# Patient Record
Sex: FEMALE | Race: OTHER | NOT HISPANIC OR LATINO
[De-identification: names, ages, dates, MRNs, and addresses within clinical notes are randomized per-mention and may not be internally consistent; named-entity substitution may affect disease eponyms.]

---

## 2022-10-24 ENCOUNTER — APPOINTMENT (OUTPATIENT)
Dept: NEUROSURGERY | Facility: CLINIC | Age: 77
End: 2022-10-24

## 2022-10-24 ENCOUNTER — NON-APPOINTMENT (OUTPATIENT)
Age: 77
End: 2022-10-24

## 2022-10-24 VITALS — RESPIRATION RATE: 16 BRPM | SYSTOLIC BLOOD PRESSURE: 170 MMHG | DIASTOLIC BLOOD PRESSURE: 85 MMHG | HEART RATE: 67 BPM

## 2022-10-24 DIAGNOSIS — I10 ESSENTIAL (PRIMARY) HYPERTENSION: ICD-10-CM

## 2022-10-24 PROCEDURE — 99204 OFFICE O/P NEW MOD 45 MIN: CPT

## 2022-10-24 NOTE — PHYSICAL EXAM
[General Appearance - Alert] : alert [General Appearance - In No Acute Distress] : in no acute distress [Person] : oriented to person [Place] : oriented to place [Time] : oriented to time [Cranial Nerves Oculomotor (III)] : extraocular motion intact [Cranial Nerves Optic (II)] : visual acuity intact bilaterally,  pupils equal round and reactive to light [Cranial Nerves Trigeminal (V)] : facial sensation intact symmetrically [Cranial Nerves Facial (VII)] : face symmetrical [Cranial Nerves Vestibulocochlear (VIII)] : hearing was intact bilaterally [Cranial Nerves Glossopharyngeal (IX)] : tongue and palate midline [Cranial Nerves Accessory (XI - Cranial And Spinal)] : head turning and shoulder shrug symmetric [Cranial Nerves Hypoglossal (XII)] : there was no tongue deviation with protrusion [Motor Tone] : muscle tone was normal in all four extremities [Motor Strength] : muscle strength was normal in all four extremities [Motor Handedness Right-Handed] : the patient is right hand dominant [Intact] : all sensory within normal limits bilaterally [Neck Appearance] : the appearance of the neck was normal [] : no respiratory distress [Abnormal Walk] : normal gait

## 2022-10-25 ENCOUNTER — APPOINTMENT (OUTPATIENT)
Dept: PULMONOLOGY | Facility: CLINIC | Age: 77
End: 2022-10-25

## 2022-10-25 DIAGNOSIS — Z01.818 ENCOUNTER FOR OTHER PREPROCEDURAL EXAMINATION: ICD-10-CM

## 2022-10-25 NOTE — DATA REVIEWED
[de-identified] : I have reviewed the most recent MRI which shows a left frontal meningioma with extensive edema

## 2022-10-25 NOTE — HISTORY OF PRESENT ILLNESS
[de-identified] : 77 year old female who lives in [place] presents with newly diagnosed left frontal mass. \par

## 2022-10-25 NOTE — ASSESSMENT
[FreeTextEntry1] : Patient will need to have this meningioma removed.  I have explained the risks of the procedure to the patient including but not limited to pain, infection, seizure, stroke, blindness, residual or recurrent disease, neurovascular injury, weakness, paralysis, heart attack, pulmonary embolism and death and he/she clearly understands and agrees to proceed.\par

## 2022-10-26 ENCOUNTER — INPATIENT (INPATIENT)
Facility: HOSPITAL | Age: 77
LOS: 22 days | Discharge: NURSING, REHAB OR LONG-TERM CARE | DRG: 25 | End: 2022-11-18
Attending: NEUROLOGICAL SURGERY | Admitting: NEUROLOGICAL SURGERY
Payer: MEDICARE

## 2022-10-26 ENCOUNTER — TRANSCRIPTION ENCOUNTER (OUTPATIENT)
Age: 77
End: 2022-10-26

## 2022-10-26 VITALS
TEMPERATURE: 98 F | RESPIRATION RATE: 18 BRPM | SYSTOLIC BLOOD PRESSURE: 156 MMHG | WEIGHT: 169.98 LBS | DIASTOLIC BLOOD PRESSURE: 90 MMHG | OXYGEN SATURATION: 100 % | HEART RATE: 78 BPM | HEIGHT: 68 IN

## 2022-10-26 DIAGNOSIS — Z01.818 ENCOUNTER FOR OTHER PREPROCEDURAL EXAMINATION: ICD-10-CM

## 2022-10-26 DIAGNOSIS — I10 ESSENTIAL (PRIMARY) HYPERTENSION: ICD-10-CM

## 2022-10-26 DIAGNOSIS — Z98.890 OTHER SPECIFIED POSTPROCEDURAL STATES: Chronic | ICD-10-CM

## 2022-10-26 DIAGNOSIS — D32.9 BENIGN NEOPLASM OF MENINGES, UNSPECIFIED: ICD-10-CM

## 2022-10-26 DIAGNOSIS — Z29.9 ENCOUNTER FOR PROPHYLACTIC MEASURES, UNSPECIFIED: ICD-10-CM

## 2022-10-26 LAB
ALBUMIN SERPL ELPH-MCNC: 4 G/DL — SIGNIFICANT CHANGE UP (ref 3.3–5)
ALP SERPL-CCNC: 74 U/L — SIGNIFICANT CHANGE UP (ref 40–120)
ALT FLD-CCNC: 8 U/L — LOW (ref 10–45)
ANION GAP SERPL CALC-SCNC: 9 MMOL/L — SIGNIFICANT CHANGE UP (ref 5–17)
ANISOCYTOSIS BLD QL: SLIGHT — SIGNIFICANT CHANGE UP
APTT BLD: 30.1 SEC — SIGNIFICANT CHANGE UP (ref 27.5–35.5)
AST SERPL-CCNC: 18 U/L — SIGNIFICANT CHANGE UP (ref 10–40)
BASOPHILS # BLD AUTO: 0 K/UL — SIGNIFICANT CHANGE UP (ref 0–0.2)
BASOPHILS NFR BLD AUTO: 0 % — SIGNIFICANT CHANGE UP (ref 0–2)
BILIRUB SERPL-MCNC: 0.4 MG/DL — SIGNIFICANT CHANGE UP (ref 0.2–1.2)
BLD GP AB SCN SERPL QL: NEGATIVE — SIGNIFICANT CHANGE UP
BLD GP AB SCN SERPL QL: NEGATIVE — SIGNIFICANT CHANGE UP
BUN SERPL-MCNC: 13 MG/DL — SIGNIFICANT CHANGE UP (ref 7–23)
CALCIUM SERPL-MCNC: 9.2 MG/DL — SIGNIFICANT CHANGE UP (ref 8.4–10.5)
CHLORIDE SERPL-SCNC: 109 MMOL/L — HIGH (ref 96–108)
CO2 SERPL-SCNC: 27 MMOL/L — SIGNIFICANT CHANGE UP (ref 22–31)
CREAT SERPL-MCNC: 0.79 MG/DL — SIGNIFICANT CHANGE UP (ref 0.5–1.3)
EGFR: 78 ML/MIN/1.73M2 — SIGNIFICANT CHANGE UP
EOSINOPHIL # BLD AUTO: 0.09 K/UL — SIGNIFICANT CHANGE UP (ref 0–0.5)
EOSINOPHIL NFR BLD AUTO: 1.7 % — SIGNIFICANT CHANGE UP (ref 0–6)
GIANT PLATELETS BLD QL SMEAR: PRESENT — SIGNIFICANT CHANGE UP
GLUCOSE SERPL-MCNC: 80 MG/DL — SIGNIFICANT CHANGE UP (ref 70–99)
HCT VFR BLD CALC: 35 % — SIGNIFICANT CHANGE UP (ref 34.5–45)
HGB BLD-MCNC: 11.2 G/DL — LOW (ref 11.5–15.5)
HYPOCHROMIA BLD QL: SLIGHT — SIGNIFICANT CHANGE UP
INR BLD: 1.22 — HIGH (ref 0.88–1.16)
LYMPHOCYTES # BLD AUTO: 1.39 K/UL — SIGNIFICANT CHANGE UP (ref 1–3.3)
LYMPHOCYTES # BLD AUTO: 25.2 % — SIGNIFICANT CHANGE UP (ref 13–44)
MAGNESIUM SERPL-MCNC: 1.9 MG/DL — SIGNIFICANT CHANGE UP (ref 1.6–2.6)
MANUAL SMEAR VERIFICATION: SIGNIFICANT CHANGE UP
MCHC RBC-ENTMCNC: 22.4 PG — LOW (ref 27–34)
MCHC RBC-ENTMCNC: 32 GM/DL — SIGNIFICANT CHANGE UP (ref 32–36)
MCV RBC AUTO: 70 FL — LOW (ref 80–100)
MICROCYTES BLD QL: SLIGHT — SIGNIFICANT CHANGE UP
MONOCYTES # BLD AUTO: 0.34 K/UL — SIGNIFICANT CHANGE UP (ref 0–0.9)
MONOCYTES NFR BLD AUTO: 6.1 % — SIGNIFICANT CHANGE UP (ref 2–14)
NEUTROPHILS # BLD AUTO: 3.69 K/UL — SIGNIFICANT CHANGE UP (ref 1.8–7.4)
NEUTROPHILS NFR BLD AUTO: 67 % — SIGNIFICANT CHANGE UP (ref 43–77)
OVALOCYTES BLD QL SMEAR: SLIGHT — SIGNIFICANT CHANGE UP
PHOSPHATE SERPL-MCNC: 3.4 MG/DL — SIGNIFICANT CHANGE UP (ref 2.5–4.5)
PLAT MORPH BLD: ABNORMAL
PLATELET # BLD AUTO: 164 K/UL — SIGNIFICANT CHANGE UP (ref 150–400)
POLYCHROMASIA BLD QL SMEAR: SLIGHT — SIGNIFICANT CHANGE UP
POTASSIUM SERPL-MCNC: 4.3 MMOL/L — SIGNIFICANT CHANGE UP (ref 3.5–5.3)
POTASSIUM SERPL-SCNC: 4.3 MMOL/L — SIGNIFICANT CHANGE UP (ref 3.5–5.3)
PROT SERPL-MCNC: 7.2 G/DL — SIGNIFICANT CHANGE UP (ref 6–8.3)
PROTHROM AB SERPL-ACNC: 14.5 SEC — HIGH (ref 10.5–13.4)
RBC # BLD: 5 M/UL — SIGNIFICANT CHANGE UP (ref 3.8–5.2)
RBC # FLD: 15.9 % — HIGH (ref 10.3–14.5)
RBC BLD AUTO: ABNORMAL
RH IG SCN BLD-IMP: POSITIVE — SIGNIFICANT CHANGE UP
RH IG SCN BLD-IMP: POSITIVE — SIGNIFICANT CHANGE UP
SARS-COV-2 RNA SPEC QL NAA+PROBE: NEGATIVE — SIGNIFICANT CHANGE UP
SCHISTOCYTES BLD QL AUTO: SLIGHT — SIGNIFICANT CHANGE UP
SODIUM SERPL-SCNC: 145 MMOL/L — SIGNIFICANT CHANGE UP (ref 135–145)
SPHEROCYTES BLD QL SMEAR: SLIGHT — SIGNIFICANT CHANGE UP
WBC # BLD: 5.51 K/UL — SIGNIFICANT CHANGE UP (ref 3.8–10.5)
WBC # FLD AUTO: 5.51 K/UL — SIGNIFICANT CHANGE UP (ref 3.8–10.5)

## 2022-10-26 PROCEDURE — 70450 CT HEAD/BRAIN W/O DYE: CPT | Mod: 26

## 2022-10-26 PROCEDURE — 99285 EMERGENCY DEPT VISIT HI MDM: CPT

## 2022-10-26 PROCEDURE — 93970 EXTREMITY STUDY: CPT | Mod: 26

## 2022-10-26 PROCEDURE — 71045 X-RAY EXAM CHEST 1 VIEW: CPT | Mod: 26

## 2022-10-26 PROCEDURE — 70553 MRI BRAIN STEM W/O & W/DYE: CPT | Mod: 26

## 2022-10-26 PROCEDURE — 99222 1ST HOSP IP/OBS MODERATE 55: CPT

## 2022-10-26 RX ORDER — LANOLIN ALCOHOL/MO/W.PET/CERES
5 CREAM (GRAM) TOPICAL AT BEDTIME
Refills: 0 | Status: DISCONTINUED | OUTPATIENT
Start: 2022-10-26 | End: 2022-10-27

## 2022-10-26 RX ORDER — SODIUM CHLORIDE 9 MG/ML
1000 INJECTION INTRAMUSCULAR; INTRAVENOUS; SUBCUTANEOUS
Refills: 0 | Status: DISCONTINUED | OUTPATIENT
Start: 2022-10-26 | End: 2022-10-27

## 2022-10-26 RX ORDER — ACETAMINOPHEN 500 MG
650 TABLET ORAL EVERY 6 HOURS
Refills: 0 | Status: DISCONTINUED | OUTPATIENT
Start: 2022-10-26 | End: 2022-10-27

## 2022-10-26 RX ORDER — LISINOPRIL 2.5 MG/1
10 TABLET ORAL ONCE
Refills: 0 | Status: COMPLETED | OUTPATIENT
Start: 2022-10-26 | End: 2022-10-26

## 2022-10-26 RX ORDER — POVIDONE-IODINE 5 %
1 AEROSOL (ML) TOPICAL ONCE
Refills: 0 | Status: DISCONTINUED | OUTPATIENT
Start: 2022-10-27 | End: 2022-10-27

## 2022-10-26 RX ORDER — MAGNESIUM SULFATE 500 MG/ML
1 VIAL (ML) INJECTION ONCE
Refills: 0 | Status: COMPLETED | OUTPATIENT
Start: 2022-10-26 | End: 2022-10-26

## 2022-10-26 RX ORDER — HYDRALAZINE HCL 50 MG
10 TABLET ORAL ONCE
Refills: 0 | Status: COMPLETED | OUTPATIENT
Start: 2022-10-26 | End: 2022-10-26

## 2022-10-26 RX ORDER — SENNA PLUS 8.6 MG/1
2 TABLET ORAL AT BEDTIME
Refills: 0 | Status: DISCONTINUED | OUTPATIENT
Start: 2022-10-26 | End: 2022-10-27

## 2022-10-26 RX ORDER — METOPROLOL TARTRATE 50 MG
25 TABLET ORAL EVERY 12 HOURS
Refills: 0 | Status: DISCONTINUED | OUTPATIENT
Start: 2022-10-26 | End: 2022-10-27

## 2022-10-26 RX ORDER — ACETAMINOPHEN 500 MG
650 TABLET ORAL ONCE
Refills: 0 | Status: COMPLETED | OUTPATIENT
Start: 2022-10-26 | End: 2022-10-26

## 2022-10-26 RX ORDER — LISINOPRIL 2.5 MG/1
40 TABLET ORAL ONCE
Refills: 0 | Status: DISCONTINUED | OUTPATIENT
Start: 2022-10-26 | End: 2022-10-26

## 2022-10-26 RX ORDER — CHLORHEXIDINE GLUCONATE 213 G/1000ML
1 SOLUTION TOPICAL EVERY 12 HOURS
Refills: 0 | Status: COMPLETED | OUTPATIENT
Start: 2022-10-26 | End: 2022-10-27

## 2022-10-26 RX ADMIN — SENNA PLUS 2 TABLET(S): 8.6 TABLET ORAL at 23:22

## 2022-10-26 RX ADMIN — Medication 10 MILLIGRAM(S): at 13:44

## 2022-10-26 RX ADMIN — Medication 650 MILLIGRAM(S): at 09:24

## 2022-10-26 RX ADMIN — Medication 25 MILLIGRAM(S): at 11:58

## 2022-10-26 RX ADMIN — CHLORHEXIDINE GLUCONATE 1 APPLICATION(S): 213 SOLUTION TOPICAL at 23:22

## 2022-10-26 RX ADMIN — Medication 100 GRAM(S): at 11:58

## 2022-10-26 RX ADMIN — Medication 25 MILLIGRAM(S): at 23:22

## 2022-10-26 RX ADMIN — LISINOPRIL 10 MILLIGRAM(S): 2.5 TABLET ORAL at 11:58

## 2022-10-26 NOTE — ED PROVIDER NOTE - CLINICAL SUMMARY MEDICAL DECISION MAKING FREE TEXT BOX
77F c PMH of HTN (on lisinopril), meningioma (no hx of neurosurgery) no PSH p/w gradual onset pulsatile headache that started 2pm yesterday after she landed in [place] after flying from [place]. On exam, VS wnl, non-focal neuro exam, no remarkable exam findings.    ddx: meningioma vs ich vs tension ha vs migraine    Plan:  - labs  - CTH  - tylenol for pain  - covid test  - neurosurgery paged, awaiting callback  - reassess

## 2022-10-26 NOTE — ED ADULT TRIAGE NOTE - OTHER COMPLAINTS
patient c/o frontal HA and nausea since yesterday at 2 PM with hx of meningioma--sent by MD Joseph for further eval

## 2022-10-26 NOTE — ED ADULT NURSE NOTE - OBJECTIVE STATEMENT
Pt is a 78 y/o female A&Ox3 but slow to recall information, accompanied by son, c/o intermittent frontal h/a and nausea since 1400 yesterday. Pt denies vomiting, head injury, fever/chills, vision changes, numbness/tingling, unilateral weakness. Hx of meningioma. Pt talking in clear, full sentences, respirations even and unlabored.

## 2022-10-26 NOTE — PROGRESS NOTE ADULT - SUBJECTIVE AND OBJECTIVE BOX
Surgery: bifrontal craniotomy for meningioma resection  Consent: Signed by  HCP                   NAME/NUMBER of HCP: Diaz Jennings (son, HCP): [phone]    Representative Consent: [  ] Signed by patient vs HCP                                                 [  ] N/A -> only for cerebral angiogram    No Known Drug Allergies  strawberry (Hives)    Subjective: Patient admitted in AM. C/o mild headache.    T(C): 36.6 (10-26-22 @ 10:05), Max: 36.7 (10-26-22 @ 08:13)  HR: 56 (10-26-22 @ 10:05) (56 - 78)  BP: 170/96 (10-26-22 @ 10:05) (156/90 - 170/96)  RR: 18 (10-26-22 @ 10:05) (18 - 18)  SpO2: 98% (10-26-22 @ 10:05) (98% - 100%)  Wt(kg): --    Physical Exam:   General: patient seen laying supine in bed in NAD  Neuro: AAOx2, follows commands, opens eyes spontaneously, confused speech, CNII-XI grossly intact, face symmetric, no pronator drift, finger to nose intact, strength 5/5 b/l upper extremities and lower extremities, sensation intact to light touch throughout  HEENT: PERRL, EOMI, VFs intact b/l  Neck: supple  Cardiac: RRR, S1S2  Pulmonary: chest rise symmetric  Abdomen: soft, nontender, nondistended  Ext: perfusing well  Skin: warm, dry      10-26    145  |  109<H>  |  13  ----------------------------<  80  4.3   |  27  |  0.79    Ca    9.2      26 Oct 2022 09:15  Phos  3.4     10-26  Mg     1.9     10-26    TPro  7.2  /  Alb  4.0  /  TBili  0.4  /  DBili  x   /  AST  18  /  ALT  8<L>  /  AlkPhos  74  10-26    CBC Full  -  ( 26 Oct 2022 09:15 )  WBC Count : 5.51 K/uL  RBC Count : 5.00 M/uL  Hemoglobin : 11.2 g/dL  Hematocrit : 35.0 %  Platelet Count - Automated : 164 K/uL  Mean Cell Volume : 70.0 fl  Mean Cell Hemoglobin : 22.4 pg  Mean Cell Hemoglobin Concentration : 32.0 gm/dL  Auto Neutrophil # : 3.69 K/uL  Auto Lymphocyte # : 1.39 K/uL  Auto Monocyte # : 0.34 K/uL  Auto Eosinophil # : 0.09 K/uL  Auto Basophil # : 0.00 K/uL  Auto Neutrophil % : 67.0 %  Auto Lymphocyte % : 25.2 %  Auto Monocyte % : 6.1 %  Auto Eosinophil % : 1.7 %  Auto Basophil % : 0.0 %    PT/INR - ( 26 Oct 2022 09:15 )   PT: 14.5 sec;   INR: 1.22          PTT - ( 26 Oct 2022 09:15 )  PTT:30.1 sec    Pregnancy test (serum hcg for any female under 56, must be resulted day before OR): [ ] Negative Result  [ ] Positive Result  [ x] N/A : male or female>57 y/o  Type & Screen (in past 72hrs):     2 Type & Screen within 72 hours if anticipate blood need in OR:  _ Y _ N  - pending result     Blood ordered and on hold for OR:   [ ] No need     [ ] 1u pRBC on hold      [x ] 2u pRBC on hold    COVID swab (in past 48hrs): x Y  _N    CXR: 10/26 no infiltrate  EKG: NSR  Medical Clearances: pending clearance by Dr. White    Last dose of antiplatelet/anticoagulation dru/2 tablet aspirin 81mg over 1 week ago    Implanted Devices (pacemaker, drug pump...etc):  []YES   [x] NO                  If yes --> EPS consulted to interrogate device: [ ] YES  [ ] NO                            If yes -->  EPS called to let them know patient going for surgery: [ ] device needs to be turned off                                                                                                                                                 [ ] magnet needs to be placed for surgery                                                                                                                                                [ ] nothing to do per EP, may proceed with cautery use in OR                                       3M nasal swab ordered?  xY  _N    Cranial surgery: Order written for hair to be shampooed night before surgery and morning before surgery  [x] yes   []no  Chlorhexidine Wipes ordered for Neck Down?  x Y  _ N  (twice a day if 1 day before surgery, daily for 3 days if 3 days prior, daily if in ICU)                 Assessment:  77 year old female with pmh HTN found to have left frontal mass, likely meningioma, on workup for confusion and memory loss. Patient is preop for bifrontal craniotomy for meningioma resection Thursday.    Plan:  Neuro:  - neuro/vital checks q1  - pain control prn  - pending MRI brain w/w/o contrast w/ sherwin and T2/flair  - pending CT head sherwin w/ neuroplastics protocol  - preop for OR thursday  - pending medical clearance    Cardiac:  - normotensive BP goal  - hold home lisinopril preop  - continue home metoprolol 25 BID (tartrate inpatient, was on ER at home)    Pulmonary:  - on RA    GI:  - regular diet, NPO after midnight  - bowel regimen    Renal:  - Na goal 135-145  - IVF while NPO    Heme:  - SCDs  - pending baseline LE dopplers    Endo:  - no issues    ID:  - afebrile    Disposition: regional status, full code, preop for OR 10/27, dispo pending    D/w Dr. Garnica      Assessment:  Present when checked    []  GCS  E   V  M     Heart Failure: []Acute, [] acute on chronic , []chronic  Heart Failure:  [] Diastolic (HFpEF), [] Systolic (HFrEF), []Combined (HFpEF and HFrEF), [] RHF, [] Pulm HTN, [] Other    [] OZZIE, [] ATN, [] AIN, [] other  [] CKD1, [] CKD2, [] CKD 3, [] CKD 4, [] CKD 5, []ESRD    Encephalopathy: [] Metabolic, [] Hepatic, [] toxic, [] Neurological, [] Other    Abnormal Nurtitional Status: [] malnurtition (see nutrition note), [ ]underweight: BMI < 19, [] morbid obesity: BMI >40, [] Cachexia    [] Sepsis  [] hypovolemic shock,[] cardiogenic shock, [] hemorrhagic shock, [] neuogenic shock  [] Acute Respiratory Failure  []Cerebral edema, [] Brain compression/ herniation,   [] Functional quadriplegia  [] Acute blood loss anemia

## 2022-10-26 NOTE — H&P ADULT - NSHPPHYSICALEXAM_GEN_ALL_CORE
General: patient seen laying supine in bed in NAD  Neuro: AAOx2, follows commands, opens eyes spontaneously, confused speech, CNII-XI grossly intact, face symmetric, no pronator drift, finger to nose intact, strength 5/5 b/l upper extremities and lower extremities, sensation intact to light touch throughout  HEENT: PERRL, EOMI, VFs intact b/l  Neck: supple  Cardiac: RRR, S1S2  Pulmonary: chest rise symmetric  Abdomen: soft, nontender, nondistended  Ext: perfusing well  Skin: warm, dry

## 2022-10-26 NOTE — CONSULT NOTE ADULT - ASSESSMENT
76 yo F hx of HTN, Hyperthyroidism s/p resection who presented with likely L frontal meningioma and planned for bifrontal craniotomy with meningioma resection 10/27

## 2022-10-26 NOTE — CONSULT NOTE ADULT - PROBLEM SELECTOR RECOMMENDATION 3
RCRI score 0, Puri score 0.3%  METs > 4, able to walk up >2 flights of stairs, does Yoga and pilates  EKG NSR no ischemic changes  - medically optimized for OR no further workup indicated, low risk for intermediate risk procedure

## 2022-10-26 NOTE — CONSULT NOTE ADULT - SUBJECTIVE AND OBJECTIVE BOX
Patient is a 77y old  Female who presents with a chief complaint of meningioma (26 Oct 2022 11:52)      HPI:  77 year old female with pmh HTN who lives in [place] presents with left frontal mass, likely meningioma. Per son, meningioma was diagnosed upon CT head performed for memory loss and confusion. Son reports that confusion has been ongoing for several months. Patient admits to mild headache but denies diplopia, blurry vision, nausea, vomiting, extremity numbness or weakness and slurred speech. Patient is preop for bifrontal craniotomy for meningioma resection 10/27.   (26 Oct 2022 10:58)    Patient seen and examined at bedside with son at bedside.  Pleasant, slightly confused.  She has a hx of HTN which she take Toprol 25mg BID and Lisinopril 10mg for.  She previously had hyperthyroidism s/p resection.  No personal hx of DM, CVA, CHF, renal dysfunction.   Currently feels well denies fever, chills, headache, N/V, abd pain, diarrhea, constipation.  Prior to admission has good functional status, walks often, able to do > 2 flights of stairs w/o chest pain.  Has a  come to her home for Yoga/Pilates and never SOB or CP during sessions      REVIEW OF SYSTEMS: see HPI    PAST MEDICAL & SURGICAL HISTORY:  Meningioma      Hypertension      H/O hyperthyroidism  s/p thyroid surgery, no longer on medication      Status post thyroid surgery          FAMILY HISTORY: No family cardiac history    SOCIAL HISTORY:  Tobacco use: Never smoker  EtOH use: None  Recreational drug use: None    MEDICATIONS:  MEDICATIONS  (STANDING):  chlorhexidine 2% Cloths 1 Application(s) Topical every 12 hours  metoprolol tartrate 25 milliGRAM(s) Oral every 12 hours  senna 2 Tablet(s) Oral at bedtime  sodium chloride 0.9%. 1000 milliLiter(s) (50 mL/Hr) IV Continuous <Continuous>    MEDICATIONS  (PRN):  acetaminophen     Tablet .. 650 milliGRAM(s) Oral every 6 hours PRN Temp greater or equal to 38C (100.4F), Mild Pain (1 - 3)      ALLERGIES:  Allergies    No Known Drug Allergies  strawberry (Hives)    Intolerances        VITAL SIGNS:  Vital Signs Last 24 Hrs  T(C): 36.6 (26 Oct 2022 13:37), Max: 36.7 (26 Oct 2022 08:13)  T(F): 97.9 (26 Oct 2022 13:37), Max: 98 (26 Oct 2022 08:13)  HR: 67 (26 Oct 2022 14:14) (56 - 78)  BP: 163/96 (26 Oct 2022 14:14) (156/89 - 177/95)  BP(mean): --  RR: 16 (26 Oct 2022 14:14) (15 - 18)  SpO2: 99% (26 Oct 2022 14:14) (95% - 100%)    Parameters below as of 26 Oct 2022 14:14  Patient On (Oxygen Delivery Method): room air          PHYSICAL EXAM:  Constitutional: resting comfortably in bed; NAD  Head: NC/AT  Eyes: PERRL, EOMI, anicteric sclera  ENT: no nasal discharge; uvula midline,; MMM  Neck: supple; no JVD  Respiratory: CTA B/L; no W/R/R, no retractions  Cardiac: +S1/S2; RRR; no M/R/G  Gastrointestinal: abdomen soft, NT/ND; no rebound or guarding; +BSx4  Back: spine midline  Extremities: WWP, no clubbing or cyanosis; no peripheral edema  Musculoskeletal: NROM x4; no joint swelling, tenderness or erythema  Vascular: 2+ radial, femoral, DP/PT pulses B/L  Dermatologic: skin warm, dry and intact; no rashes, wounds, or scars  Lymphatic: no submandibular or cervical LAD  Neurologic: AAOx2, mild confusion; CNII-XII grossly intact; no focal deficits  Psychiatric: pleasant     LABS:                        11.2   5.51  )-----------( 164      ( 26 Oct 2022 09:15 )             35.0     10-26    145  |  109<H>  |  13  ----------------------------<  80  4.3   |  27  |  0.79    Ca    9.2      26 Oct 2022 09:15  Phos  3.4     10-26  Mg     1.9     10-26    TPro  7.2  /  Alb  4.0  /  TBili  0.4  /  DBili  x   /  AST  18  /  ALT  8<L>  /  AlkPhos  74  10-26    PT/INR - ( 26 Oct 2022 09:15 )   PT: 14.5 sec;   INR: 1.22          PTT - ( 26 Oct 2022 09:15 )  PTT:30.1 sec        CAPILLARY BLOOD GLUCOSE              RADIOLOGY & ADDITIONAL TESTS:  < from: CT Head No Cont (10.26.22 @ 11:24) >  IMPRESSION: 2.4 cm isodense left frontal mass with extensive vasogenic   edema. Mass effect on the left frontal horn with left to right midline   shift. Given the presence of bony hyperostosis findings favor to   represent meningioma. Images are available for surgical guidance.    < end of copied text >      < from: US Duplex Venous Lower Ext Complete, Bilateral (10.26.22 @ 13:10) >  IMPRESSION:  No evidence of deep venous thrombosis in either lower extremity.    < end of copied text >

## 2022-10-26 NOTE — H&P ADULT - HISTORY OF PRESENT ILLNESS
77 year old female who lives in [place] presents with left frontal mass, likely meningioma. Patient admits to mild headache but denies diplopia, blurry vision, nausea, vomiting, extremity numbness or weakness and    77 year old female with pmh HTN who lives in [place] presents with left frontal mass, likely meningioma. Per son, meningioma was diagnosed upon CT head performed for memory loss and confusion. Son reports that confusion has been ongoing for several months. Patient admits to mild headache but denies diplopia, blurry vision, nausea, vomiting, extremity numbness or weakness and slurred speech. Patient is preop for bifrontal craniotomy for meningioma resection 10/27.

## 2022-10-26 NOTE — ED PROVIDER NOTE - OBJECTIVE STATEMENT
77F c PMH of HTN (on lisinopril), meningioma (no hx of neurosurgery) no PSH p/w gradual onset pulsatile headache that started 2pm yesterday after she landed in [place] after flying from [place]. no trauma. has hx of HAs in the past, unclear if this is similar. denies bloodthinner use. was sent to ED by her neurosurgeon Dr. Garnica.    denies slurred speech, focal numbness or weakness, or vision changes

## 2022-10-26 NOTE — H&P ADULT - NSHPLABSRESULTS_GEN_ALL_CORE
.  LABS:                         11.2   5.51  )-----------( 164      ( 26 Oct 2022 09:15 )             35.0     10-26    145  |  109<H>  |  13  ----------------------------<  80  4.3   |  27  |  0.79    Ca    9.2      26 Oct 2022 09:15  Phos  3.4     10-26  Mg     1.9     10-26    TPro  7.2  /  Alb  4.0  /  TBili  0.4  /  DBili  x   /  AST  18  /  ALT  8<L>  /  AlkPhos  74  10-26    PT/INR - ( 26 Oct 2022 09:15 )   PT: 14.5 sec;   INR: 1.22          PTT - ( 26 Oct 2022 09:15 )  PTT:30.1 sec          RADIOLOGY, EKG & ADDITIONAL TESTS: Reviewed.

## 2022-10-26 NOTE — H&P ADULT - ASSESSMENT
77 year old female with pmh HTN found to have left frontal mass, likely meningioma, on workup for confusion and memory loss. Patient is preop for bifrontal craniotomy for meningioma resection Thursday.    Plan:  Neuro:  - neuro/vital checks q1  - pain control prn  - pending MRI brain w/w/o contrast w/ sherwin and T2/flair  - pending CT head sherwin w/ neuroplastics protocol  - preop for OR thursday  - pending medical clearance    Cardiac:  - normotensive BP goal  - hold home lisinopril preop  - continue home metoprolol 25 BID (tartrate inpatient, was on ER at home)    Pulmonary:  - on RA    GI:  - regular diet, NPO after midnight  - bowel regimen    Renal:  - Na goal 135-145  - IVF while NPO    Heme:  - SCDs  - pending baseline LE dopplers    Endo:  - no issues    ID:  - afebrile    Disposition: regional status, full code, preop for OR 10/27, dispo pending    D/w Dr. Garnica

## 2022-10-26 NOTE — ED PROVIDER NOTE - PROGRESS NOTE DETAILS
spoke with neurosurgery team who states pt sent for admission for resection of meningioma requests admission to their service will admit

## 2022-10-26 NOTE — H&P ADULT - NSICDXPASTMEDICALHX_GEN_ALL_CORE_FT
PAST MEDICAL HISTORY:  H/O hyperthyroidism s/p thyroid surgery, no longer on medication    Hypertension     Meningioma

## 2022-10-26 NOTE — CONSULT NOTE ADULT - PROBLEM SELECTOR RECOMMENDATION 2
- on Toprol XL 25 BID and lisinopril 10mg at home  - Lopressor 25mg BID and can uptitrate as needed  - hold home lisinopril on day of OR

## 2022-10-26 NOTE — ED PROVIDER NOTE - PHYSICAL EXAMINATION
NEURO: pupils 3 mm, PERRL, EOMI (CN III, IV, VI), facial sensation intact to light touch in all 3 divisions bilat (CN V), face is symmetric with normal eye closure, eye opening, and smile (CN VII), hearing is normal to rubbing fingers (CN VII), palate elevates symmetrically, phonation is normal (CN IX, X),  shoulder shrug intact bilat (CN XI), tongue is midline with nl movements and no atrophy (CN XII), finger to nose test nl bilat, negative pronator drift bilat,, speech is clear; 5/5 motor strength BUE and BLE: deltoids, biceps, triceps, wrist flexors/extensors, hand , hip flexors, knee flexors/extensors, plantar/dorsiflexors, hallux flexors/extensors; sensation intact to light touch BUE and BLE: C5-T1 and L3-S1 gait wnl   no nystagmus

## 2022-10-27 ENCOUNTER — APPOINTMENT (OUTPATIENT)
Dept: NEUROSURGERY | Facility: HOSPITAL | Age: 77
End: 2022-10-27

## 2022-10-27 ENCOUNTER — RESULT REVIEW (OUTPATIENT)
Age: 77
End: 2022-10-27

## 2022-10-27 ENCOUNTER — TRANSCRIPTION ENCOUNTER (OUTPATIENT)
Age: 77
End: 2022-10-27

## 2022-10-27 LAB
ALBUMIN SERPL ELPH-MCNC: 3.4 G/DL — SIGNIFICANT CHANGE UP (ref 3.3–5)
ALP SERPL-CCNC: 65 U/L — SIGNIFICANT CHANGE UP (ref 40–120)
ALT FLD-CCNC: 7 U/L — LOW (ref 10–45)
ANION GAP SERPL CALC-SCNC: 10 MMOL/L — SIGNIFICANT CHANGE UP (ref 5–17)
ANION GAP SERPL CALC-SCNC: 9 MMOL/L — SIGNIFICANT CHANGE UP (ref 5–17)
APTT BLD: 27.6 SEC — SIGNIFICANT CHANGE UP (ref 27.5–35.5)
AST SERPL-CCNC: 16 U/L — SIGNIFICANT CHANGE UP (ref 10–40)
BASE EXCESS BLDA CALC-SCNC: -4 MMOL/L — LOW (ref -2–3)
BILIRUB SERPL-MCNC: <0.2 MG/DL — SIGNIFICANT CHANGE UP (ref 0.2–1.2)
BUN SERPL-MCNC: 10 MG/DL — SIGNIFICANT CHANGE UP (ref 7–23)
BUN SERPL-MCNC: 10 MG/DL — SIGNIFICANT CHANGE UP (ref 7–23)
CA-I BLDA-SCNC: 1.14 MMOL/L — LOW (ref 1.15–1.33)
CALCIUM SERPL-MCNC: 8.9 MG/DL — SIGNIFICANT CHANGE UP (ref 8.4–10.5)
CALCIUM SERPL-MCNC: 9 MG/DL — SIGNIFICANT CHANGE UP (ref 8.4–10.5)
CHLORIDE SERPL-SCNC: 110 MMOL/L — HIGH (ref 96–108)
CHLORIDE SERPL-SCNC: 114 MMOL/L — HIGH (ref 96–108)
CHOLEST SERPL-MCNC: 173 MG/DL — SIGNIFICANT CHANGE UP
CO2 BLDA-SCNC: 22 MMOL/L — SIGNIFICANT CHANGE UP (ref 19–24)
CO2 SERPL-SCNC: 22 MMOL/L — SIGNIFICANT CHANGE UP (ref 22–31)
CO2 SERPL-SCNC: 23 MMOL/L — SIGNIFICANT CHANGE UP (ref 22–31)
COHGB MFR BLDA: 1.6 % — SIGNIFICANT CHANGE UP
CREAT SERPL-MCNC: 0.68 MG/DL — SIGNIFICANT CHANGE UP (ref 0.5–1.3)
CREAT SERPL-MCNC: 0.7 MG/DL — SIGNIFICANT CHANGE UP (ref 0.5–1.3)
EGFR: 90 ML/MIN/1.73M2 — SIGNIFICANT CHANGE UP
EGFR: 91 ML/MIN/1.73M2 — SIGNIFICANT CHANGE UP
GLUCOSE BLDA-MCNC: 112 MG/DL — HIGH (ref 70–99)
GLUCOSE BLDC GLUCOMTR-MCNC: 139 MG/DL — HIGH (ref 70–99)
GLUCOSE BLDC GLUCOMTR-MCNC: 150 MG/DL — HIGH (ref 70–99)
GLUCOSE SERPL-MCNC: 146 MG/DL — HIGH (ref 70–99)
GLUCOSE SERPL-MCNC: 148 MG/DL — HIGH (ref 70–99)
HCO3 BLDA-SCNC: 21 MMOL/L — SIGNIFICANT CHANGE UP (ref 21–28)
HCT VFR BLD CALC: 30.8 % — LOW (ref 34.5–45)
HCV AB S/CO SERPL IA: 0.04 S/CO — SIGNIFICANT CHANGE UP
HCV AB SERPL-IMP: SIGNIFICANT CHANGE UP
HDLC SERPL-MCNC: 58 MG/DL — SIGNIFICANT CHANGE UP
HGB BLD-MCNC: 9.9 G/DL — LOW (ref 11.5–15.5)
HGB BLDA-MCNC: 10.2 G/DL — LOW (ref 11.7–16.1)
INR BLD: 1.37 — HIGH (ref 0.88–1.16)
LIPID PNL WITH DIRECT LDL SERPL: 106 MG/DL — HIGH
MAGNESIUM SERPL-MCNC: 1.7 MG/DL — SIGNIFICANT CHANGE UP (ref 1.6–2.6)
MCHC RBC-ENTMCNC: 22.4 PG — LOW (ref 27–34)
MCHC RBC-ENTMCNC: 32.1 GM/DL — SIGNIFICANT CHANGE UP (ref 32–36)
MCV RBC AUTO: 69.7 FL — LOW (ref 80–100)
METHGB MFR BLDA: 0.7 % — SIGNIFICANT CHANGE UP
NON HDL CHOLESTEROL: 115 MG/DL — SIGNIFICANT CHANGE UP
NRBC # BLD: 0 /100 WBCS — SIGNIFICANT CHANGE UP (ref 0–0)
OSMOLALITY SERPL: 296 MOSM/KG — SIGNIFICANT CHANGE UP (ref 280–301)
OXYHGB MFR BLDA: 97.7 % — HIGH (ref 90–95)
PCO2 BLDA: 36 MMHG — SIGNIFICANT CHANGE UP (ref 32–45)
PH BLDA: 7.37 — SIGNIFICANT CHANGE UP (ref 7.35–7.45)
PHOSPHATE SERPL-MCNC: 4.1 MG/DL — SIGNIFICANT CHANGE UP (ref 2.5–4.5)
PLATELET # BLD AUTO: 150 K/UL — SIGNIFICANT CHANGE UP (ref 150–400)
PO2 BLDA: 216 MMHG — HIGH (ref 83–108)
POTASSIUM BLDA-SCNC: 2.9 MMOL/L — CRITICAL LOW (ref 3.5–5.1)
POTASSIUM SERPL-MCNC: 4.1 MMOL/L — SIGNIFICANT CHANGE UP (ref 3.5–5.3)
POTASSIUM SERPL-MCNC: 4.4 MMOL/L — SIGNIFICANT CHANGE UP (ref 3.5–5.3)
POTASSIUM SERPL-SCNC: 4.1 MMOL/L — SIGNIFICANT CHANGE UP (ref 3.5–5.3)
POTASSIUM SERPL-SCNC: 4.4 MMOL/L — SIGNIFICANT CHANGE UP (ref 3.5–5.3)
PROT SERPL-MCNC: 5.9 G/DL — LOW (ref 6–8.3)
PROTHROM AB SERPL-ACNC: 16.4 SEC — HIGH (ref 10.5–13.4)
RBC # BLD: 4.42 M/UL — SIGNIFICANT CHANGE UP (ref 3.8–5.2)
RBC # FLD: 15.9 % — HIGH (ref 10.3–14.5)
SAO2 % BLDA: 100 % — HIGH (ref 94–98)
SODIUM BLDA-SCNC: 134 MMOL/L — LOW (ref 136–145)
SODIUM SERPL-SCNC: 143 MMOL/L — SIGNIFICANT CHANGE UP (ref 135–145)
SODIUM SERPL-SCNC: 145 MMOL/L — SIGNIFICANT CHANGE UP (ref 135–145)
TRIGL SERPL-MCNC: 46 MG/DL — SIGNIFICANT CHANGE UP
WBC # BLD: 7.66 K/UL — SIGNIFICANT CHANGE UP (ref 3.8–10.5)
WBC # FLD AUTO: 7.66 K/UL — SIGNIFICANT CHANGE UP (ref 3.8–10.5)

## 2022-10-27 PROCEDURE — 61601 RESECT/EXCISE CRANIAL LESION: CPT | Mod: 22

## 2022-10-27 PROCEDURE — 61583 CRANIOFACIAL APPROACH SKULL: CPT

## 2022-10-27 PROCEDURE — 99233 SBSQ HOSP IP/OBS HIGH 50: CPT

## 2022-10-27 PROCEDURE — 88341 IMHCHEM/IMCYTCHM EA ADD ANTB: CPT | Mod: 26,59

## 2022-10-27 PROCEDURE — 61781 SCAN PROC CRANIAL INTRA: CPT

## 2022-10-27 PROCEDURE — 88342 IMHCHEM/IMCYTCHM 1ST ANTB: CPT | Mod: 26,59

## 2022-10-27 PROCEDURE — 88360 TUMOR IMMUNOHISTOCHEM/MANUAL: CPT | Mod: 26

## 2022-10-27 PROCEDURE — 88307 TISSUE EXAM BY PATHOLOGIST: CPT | Mod: 26

## 2022-10-27 PROCEDURE — 15750 NEUROVASCULAR PEDICLE FLAP: CPT

## 2022-10-27 PROCEDURE — 70450 CT HEAD/BRAIN W/O DYE: CPT | Mod: 26

## 2022-10-27 PROCEDURE — 88311 DECALCIFY TISSUE: CPT | Mod: 26

## 2022-10-27 PROCEDURE — 88305 TISSUE EXAM BY PATHOLOGIST: CPT | Mod: 26

## 2022-10-27 PROCEDURE — 99291 CRITICAL CARE FIRST HOUR: CPT

## 2022-10-27 DEVICE — SURGIFLO HEMOSTATIC MATRIX KIT: Type: IMPLANTABLE DEVICE | Site: LEFT | Status: FUNCTIONAL

## 2022-10-27 DEVICE — DVC ADHERUS AUTOSPRAY W/ DURAL SEALANT EXT TIP: Type: IMPLANTABLE DEVICE | Site: LEFT | Status: FUNCTIONAL

## 2022-10-27 DEVICE — MAYFIELD SKULL PIN ADULT PLASTIC: Type: IMPLANTABLE DEVICE | Site: LEFT | Status: FUNCTIONAL

## 2022-10-27 DEVICE — MESH DYNAMIC 1.7MM 90X90X.3MM: Type: IMPLANTABLE DEVICE | Site: LEFT | Status: FUNCTIONAL

## 2022-10-27 DEVICE — SURGIFOAM PAD 8CM X 12.5CM X 10MM (100): Type: IMPLANTABLE DEVICE | Site: LEFT | Status: FUNCTIONAL

## 2022-10-27 DEVICE — SCREW UN3 AXS SELF DRILL 1.5X4MM 5/PK: Type: IMPLANTABLE DEVICE | Site: LEFT | Status: FUNCTIONAL

## 2022-10-27 DEVICE — SURGICEL 4 X 8": Type: IMPLANTABLE DEVICE | Site: LEFT | Status: FUNCTIONAL

## 2022-10-27 DEVICE — PLATE COVER BURRHOLE UN3 W/TAB 14MM: Type: IMPLANTABLE DEVICE | Site: LEFT | Status: FUNCTIONAL

## 2022-10-27 RX ORDER — ACETAMINOPHEN 500 MG
1000 TABLET ORAL EVERY 6 HOURS
Refills: 0 | Status: DISCONTINUED | OUTPATIENT
Start: 2022-10-27 | End: 2022-10-27

## 2022-10-27 RX ORDER — CEFAZOLIN SODIUM 1 G
2000 VIAL (EA) INJECTION EVERY 8 HOURS
Refills: 0 | Status: DISCONTINUED | OUTPATIENT
Start: 2022-10-27 | End: 2022-10-27

## 2022-10-27 RX ORDER — SENNA PLUS 8.6 MG/1
2 TABLET ORAL AT BEDTIME
Refills: 0 | Status: DISCONTINUED | OUTPATIENT
Start: 2022-10-27 | End: 2022-11-18

## 2022-10-27 RX ORDER — ACETAMINOPHEN 500 MG
650 TABLET ORAL EVERY 6 HOURS
Refills: 0 | Status: DISCONTINUED | OUTPATIENT
Start: 2022-10-28 | End: 2022-11-18

## 2022-10-27 RX ORDER — LEVETIRACETAM 250 MG/1
500 TABLET, FILM COATED ORAL EVERY 12 HOURS
Refills: 0 | Status: DISCONTINUED | OUTPATIENT
Start: 2022-10-27 | End: 2022-10-27

## 2022-10-27 RX ORDER — HYDRALAZINE HCL 50 MG
5 TABLET ORAL ONCE
Refills: 0 | Status: COMPLETED | OUTPATIENT
Start: 2022-10-27 | End: 2022-10-27

## 2022-10-27 RX ORDER — CEFAZOLIN SODIUM 1 G
2000 VIAL (EA) INJECTION EVERY 8 HOURS
Refills: 0 | Status: DISCONTINUED | OUTPATIENT
Start: 2022-10-27 | End: 2022-10-31

## 2022-10-27 RX ORDER — HYDRALAZINE HCL 50 MG
10 TABLET ORAL
Refills: 0 | Status: DISCONTINUED | OUTPATIENT
Start: 2022-10-27 | End: 2022-10-29

## 2022-10-27 RX ORDER — MAGNESIUM SULFATE 500 MG/ML
2 VIAL (ML) INJECTION ONCE
Refills: 0 | Status: COMPLETED | OUTPATIENT
Start: 2022-10-27 | End: 2022-10-27

## 2022-10-27 RX ORDER — DEXTROSE 50 % IN WATER 50 %
15 SYRINGE (ML) INTRAVENOUS ONCE
Refills: 0 | Status: DISCONTINUED | OUTPATIENT
Start: 2022-10-27 | End: 2022-10-29

## 2022-10-27 RX ORDER — OXYCODONE HYDROCHLORIDE 5 MG/1
10 TABLET ORAL EVERY 4 HOURS
Refills: 0 | Status: DISCONTINUED | OUTPATIENT
Start: 2022-10-27 | End: 2022-10-28

## 2022-10-27 RX ORDER — SODIUM CHLORIDE 9 MG/ML
1000 INJECTION, SOLUTION INTRAVENOUS
Refills: 0 | Status: DISCONTINUED | OUTPATIENT
Start: 2022-10-27 | End: 2022-10-29

## 2022-10-27 RX ORDER — LANOLIN ALCOHOL/MO/W.PET/CERES
5 CREAM (GRAM) TOPICAL AT BEDTIME
Refills: 0 | Status: DISCONTINUED | OUTPATIENT
Start: 2022-10-27 | End: 2022-10-28

## 2022-10-27 RX ORDER — KETOROLAC TROMETHAMINE 30 MG/ML
15 SYRINGE (ML) INJECTION EVERY 6 HOURS
Refills: 0 | Status: DISCONTINUED | OUTPATIENT
Start: 2022-10-27 | End: 2022-10-27

## 2022-10-27 RX ORDER — PANTOPRAZOLE SODIUM 20 MG/1
40 TABLET, DELAYED RELEASE ORAL
Refills: 0 | Status: DISCONTINUED | OUTPATIENT
Start: 2022-10-27 | End: 2022-11-12

## 2022-10-27 RX ORDER — METOPROLOL TARTRATE 50 MG
25 TABLET ORAL EVERY 12 HOURS
Refills: 0 | Status: DISCONTINUED | OUTPATIENT
Start: 2022-10-27 | End: 2022-10-27

## 2022-10-27 RX ORDER — DEXAMETHASONE 0.5 MG/5ML
4 ELIXIR ORAL EVERY 6 HOURS
Refills: 0 | Status: DISCONTINUED | OUTPATIENT
Start: 2022-10-27 | End: 2022-10-29

## 2022-10-27 RX ORDER — LEVETIRACETAM 250 MG/1
500 TABLET, FILM COATED ORAL EVERY 12 HOURS
Refills: 0 | Status: DISCONTINUED | OUTPATIENT
Start: 2022-10-27 | End: 2022-10-29

## 2022-10-27 RX ORDER — LISINOPRIL 2.5 MG/1
10 TABLET ORAL DAILY
Refills: 0 | Status: DISCONTINUED | OUTPATIENT
Start: 2022-10-27 | End: 2022-11-07

## 2022-10-27 RX ORDER — INSULIN LISPRO 100/ML
VIAL (ML) SUBCUTANEOUS
Refills: 0 | Status: DISCONTINUED | OUTPATIENT
Start: 2022-10-27 | End: 2022-10-29

## 2022-10-27 RX ORDER — DEXTROSE 50 % IN WATER 50 %
25 SYRINGE (ML) INTRAVENOUS ONCE
Refills: 0 | Status: DISCONTINUED | OUTPATIENT
Start: 2022-10-27 | End: 2022-10-29

## 2022-10-27 RX ORDER — DEXAMETHASONE 0.5 MG/5ML
ELIXIR ORAL
Refills: 0 | Status: DISCONTINUED | OUTPATIENT
Start: 2022-10-27 | End: 2022-10-29

## 2022-10-27 RX ORDER — METOPROLOL TARTRATE 50 MG
25 TABLET ORAL EVERY 12 HOURS
Refills: 0 | Status: DISCONTINUED | OUTPATIENT
Start: 2022-10-27 | End: 2022-10-30

## 2022-10-27 RX ORDER — HYDRALAZINE HCL 50 MG
10 TABLET ORAL ONCE
Refills: 0 | Status: COMPLETED | OUTPATIENT
Start: 2022-10-27 | End: 2022-10-27

## 2022-10-27 RX ORDER — KETOROLAC TROMETHAMINE 30 MG/ML
15 SYRINGE (ML) INJECTION EVERY 6 HOURS
Refills: 0 | Status: DISCONTINUED | OUTPATIENT
Start: 2022-10-27 | End: 2022-10-29

## 2022-10-27 RX ORDER — SODIUM CHLORIDE 9 MG/ML
1000 INJECTION INTRAMUSCULAR; INTRAVENOUS; SUBCUTANEOUS
Refills: 0 | Status: DISCONTINUED | OUTPATIENT
Start: 2022-10-27 | End: 2022-10-28

## 2022-10-27 RX ORDER — GLUCAGON INJECTION, SOLUTION 0.5 MG/.1ML
1 INJECTION, SOLUTION SUBCUTANEOUS ONCE
Refills: 0 | Status: DISCONTINUED | OUTPATIENT
Start: 2022-10-27 | End: 2022-10-29

## 2022-10-27 RX ORDER — SODIUM CHLORIDE 5 G/100ML
250 INJECTION, SOLUTION INTRAVENOUS
Refills: 0 | Status: DISCONTINUED | OUTPATIENT
Start: 2022-10-27 | End: 2022-10-28

## 2022-10-27 RX ORDER — OXYCODONE HYDROCHLORIDE 5 MG/1
5 TABLET ORAL EVERY 4 HOURS
Refills: 0 | Status: DISCONTINUED | OUTPATIENT
Start: 2022-10-27 | End: 2022-10-28

## 2022-10-27 RX ORDER — ACETAMINOPHEN 500 MG
1000 TABLET ORAL ONCE
Refills: 0 | Status: COMPLETED | OUTPATIENT
Start: 2022-10-27 | End: 2022-10-27

## 2022-10-27 RX ORDER — ACETAMINOPHEN 500 MG
1000 TABLET ORAL EVERY 8 HOURS
Refills: 0 | Status: COMPLETED | OUTPATIENT
Start: 2022-10-27 | End: 2022-10-27

## 2022-10-27 RX ORDER — METOPROLOL TARTRATE 50 MG
25 TABLET ORAL
Refills: 0 | Status: DISCONTINUED | OUTPATIENT
Start: 2022-10-27 | End: 2022-10-27

## 2022-10-27 RX ADMIN — LEVETIRACETAM 400 MILLIGRAM(S): 250 TABLET, FILM COATED ORAL at 18:33

## 2022-10-27 RX ADMIN — Medication 100 MILLIGRAM(S): at 17:06

## 2022-10-27 RX ADMIN — Medication 10 MILLIGRAM(S): at 20:47

## 2022-10-27 RX ADMIN — Medication 1000 MILLIGRAM(S): at 16:35

## 2022-10-27 RX ADMIN — Medication 1000 MILLIGRAM(S): at 22:30

## 2022-10-27 RX ADMIN — Medication 100 MILLIGRAM(S): at 23:22

## 2022-10-27 RX ADMIN — Medication 25 GRAM(S): at 14:45

## 2022-10-27 RX ADMIN — SODIUM CHLORIDE 50 MILLILITER(S): 9 INJECTION INTRAMUSCULAR; INTRAVENOUS; SUBCUTANEOUS at 00:05

## 2022-10-27 RX ADMIN — CHLORHEXIDINE GLUCONATE 1 APPLICATION(S): 213 SOLUTION TOPICAL at 05:30

## 2022-10-27 RX ADMIN — Medication 4 MILLIGRAM(S): at 18:33

## 2022-10-27 RX ADMIN — Medication 5 MILLIGRAM(S): at 00:05

## 2022-10-27 RX ADMIN — Medication 4 MILLIGRAM(S): at 23:22

## 2022-10-27 RX ADMIN — Medication 400 MILLIGRAM(S): at 14:50

## 2022-10-27 RX ADMIN — Medication 5 MILLIGRAM(S): at 15:25

## 2022-10-27 RX ADMIN — Medication 400 MILLIGRAM(S): at 21:50

## 2022-10-27 RX ADMIN — Medication 15 MILLIGRAM(S): at 15:40

## 2022-10-27 RX ADMIN — Medication 10 MILLIGRAM(S): at 17:10

## 2022-10-27 RX ADMIN — Medication 15 MILLIGRAM(S): at 15:25

## 2022-10-27 NOTE — PROGRESS NOTE ADULT - SUBJECTIVE AND OBJECTIVE BOX
NEUROSURGERY POST OP NOTE:    POD# 0 S/P bifrontal craniotomy for removal of skull base mass (frozen: meningioma)    S: Pt seen and examined at bedside in NSICU. Pt reports mild headache otherwise neurologically stable.      T(C): 35.7 (10-27-22 @ 13:30), Max: 36.7 (10-26-22 @ 21:05)  HR: 83 (10-27-22 @ 13:45) (65 - 90)  BP: 135/77 (10-27-22 @ 14:00) (118/69 - 163/96)  RR: 18 (10-27-22 @ 14:00) (16 - 19)  SpO2: 97% (10-27-22 @ 14:00) (93% - 99%)      10-27-22 @ 07:01  -  10-27-22 @ 14:09  --------------------------------------------------------  IN: 150 mL / OUT: 150 mL / NET: 0 mL        acetaminophen   IVPB .. 1000 milliGRAM(s) IV Intermittent every 8 hours  bisacodyl 5 milliGRAM(s) Oral daily PRN  ceFAZolin   IVPB 2000 milliGRAM(s) IV Intermittent every 8 hours  dexAMETHasone  Injectable 4 milliGRAM(s) IV Push every 6 hours  dexAMETHasone  Injectable   IV Push   dextrose 5%. 1000 milliLiter(s) IV Continuous <Continuous>  dextrose 50% Injectable 25 Gram(s) IV Push once  dextrose Oral Gel 15 Gram(s) Oral once PRN  glucagon  Injectable 1 milliGRAM(s) IntraMuscular once  insulin lispro (ADMELOG) corrective regimen sliding scale   SubCutaneous Before meals and at bedtime  levETIRAcetam 500 milliGRAM(s) Oral every 12 hours  lisinopril 10 milliGRAM(s) Oral daily  melatonin 5 milliGRAM(s) Oral at bedtime PRN  metoprolol tartrate 25 milliGRAM(s) Oral every 12 hours  oxyCODONE    IR 5 milliGRAM(s) Oral every 4 hours PRN  oxyCODONE    IR 10 milliGRAM(s) Oral every 4 hours PRN  pantoprazole    Tablet 40 milliGRAM(s) Oral before breakfast  senna 2 Tablet(s) Oral at bedtime  sodium chloride 0.9%. 1000 milliLiter(s) IV Continuous <Continuous>    PHYSICAL EXAM:  General: patient seen laying supine in bed in NAD  Neuro: AAOx2 (self, place), follows commands, opens eyes spontaneously, confused speech  CNII-XI grossly intact, face symmetric, no pronator drift, finger to nose intact, strength 5/5 b/l upper extremities and lower extremities, sensation intact to light touch throughout  HEENT: PERRL, EOMI, VFs intact b/l, tongue midline  Neck: supple  Cardiac: RRR, S1S2  Pulmonary: chest rise symmetric  Abdomen: soft, nontender, nondistended  Ext: perfusing well  Skin: warm, dry  WOUND: Crani incision C/D/I  +JUN subgaleal x2 (to suction)  +Nia  +jaz    Assessment: 77 year old female with pmh HTN found to have left frontal brain mass, likely meningioma, on workup for confusion and memory loss. Patient is now s/p bifrontal craniotomy for meningioma resection (frozen: meningioma) 10/27/22    Plan:  Neuro:  - neuro/vital checks q1  - pain control prn  - keppra for seizure prophylaxis  - decadron 4q6 x3d then taper over 1 week  - pending postop MRI brain  - subgaleal JUN x2, monitor output    Cardiac:  - -140  - lisinopril held, home metoprolol 25 BID (tartrate inpatient, was on ER at home)    Pulmonary:  - on RA, NC prn    GI:  - NPO, ADAT  - bowel regimen  - PPI while on steroids    Renal:  - Na goal 135-145  - IVF while NPO    Heme:  - SCDs for now  - pending baseline LE dopplers    Endo:  - no issues  - ISS    ID:  - afebrile  - psotop ancef    Disposition: ICU status, full code, dispo pending PT/OT  Family updated with plan    D/w Dr. Garnica and Dr. Shook           NEUROSURGERY POST OP NOTE:    POD# 0 S/P bifrontal craniotomy for removal of skull base mass (frozen: meningioma)    S: Pt seen and examined at bedside in NSICU. Pt reports mild headache otherwise neurologically stable.      T(C): 35.7 (10-27-22 @ 13:30), Max: 36.7 (10-26-22 @ 21:05)  HR: 83 (10-27-22 @ 13:45) (65 - 90)  BP: 135/77 (10-27-22 @ 14:00) (118/69 - 163/96)  RR: 18 (10-27-22 @ 14:00) (16 - 19)  SpO2: 97% (10-27-22 @ 14:00) (93% - 99%)      10-27-22 @ 07:01  -  10-27-22 @ 14:09  --------------------------------------------------------  IN: 150 mL / OUT: 150 mL / NET: 0 mL        acetaminophen   IVPB .. 1000 milliGRAM(s) IV Intermittent every 8 hours  bisacodyl 5 milliGRAM(s) Oral daily PRN  ceFAZolin   IVPB 2000 milliGRAM(s) IV Intermittent every 8 hours  dexAMETHasone  Injectable 4 milliGRAM(s) IV Push every 6 hours  dexAMETHasone  Injectable   IV Push   dextrose 5%. 1000 milliLiter(s) IV Continuous <Continuous>  dextrose 50% Injectable 25 Gram(s) IV Push once  dextrose Oral Gel 15 Gram(s) Oral once PRN  glucagon  Injectable 1 milliGRAM(s) IntraMuscular once  insulin lispro (ADMELOG) corrective regimen sliding scale   SubCutaneous Before meals and at bedtime  levETIRAcetam 500 milliGRAM(s) Oral every 12 hours  lisinopril 10 milliGRAM(s) Oral daily  melatonin 5 milliGRAM(s) Oral at bedtime PRN  metoprolol tartrate 25 milliGRAM(s) Oral every 12 hours  oxyCODONE    IR 5 milliGRAM(s) Oral every 4 hours PRN  oxyCODONE    IR 10 milliGRAM(s) Oral every 4 hours PRN  pantoprazole    Tablet 40 milliGRAM(s) Oral before breakfast  senna 2 Tablet(s) Oral at bedtime  sodium chloride 0.9%. 1000 milliLiter(s) IV Continuous <Continuous>    PHYSICAL EXAM:  General: patient seen laying supine in bed in NAD  Neuro: AAOx1 (self,) follows commands, opens eyes spontaneously, confused speech  CNII-XI grossly intact, face symmetric, no pronator drift, finger to nose intact, strength 5/5 b/l upper extremities and lower extremities, sensation intact to light touch throughout  HEENT: PERRL, EOMI, VFs intact b/l  Neck: supple  Cardiac: RRR, S1S2  Pulmonary: chest rise symmetric  Abdomen: soft, nontender, nondistended  Ext: perfusing well  Skin: warm, dry  WOUND: Headwrap in place, C/D/I  +JUN subgaleal x2 (to suction)  +[place]  +sebastian    Assessment: 77 year old female with pmh HTN found to have left frontal brain mass, likely meningioma, on workup for confusion and memory loss. Patient is now s/p bifrontal craniotomy for meningioma resection (frozen: meningioma) 10/27/22    Plan:  Neuro:  - neuro/vital checks q1  - pain control prn  - keppra for seizure prophylaxis  - decadron 4q6 x3d then taper over 1 week  - pending postop MRI brain  - subgaleal JUN x2, monitor output    Cardiac:  - -140  - lisinopril held, home metoprolol 25 BID (tartrate inpatient, was on ER at home)    Pulmonary:  - on RA, NC prn    GI:  - NPO, ADAT  - bowel regimen  - PPI while on steroids    Renal:  - Na goal 135-145  - IVF while NPO    Heme:  - SCDs for now  - pending baseline LE dopplers    Endo:  - no issues  - ISS    ID:  - afebrile  - psotop ancef    Disposition: ICU status, full code, dispo pending PT/OT  Family updated with plan    D/w Dr. Garnica and Dr. Shook

## 2022-10-27 NOTE — PROGRESS NOTE ADULT - SUBJECTIVE AND OBJECTIVE BOX
=================================  NEUROCRITICAL CARE ATTENDING NOTE  =================================    VICKIE JARRETT   MRN-[id number]  Summary:  77y/F with pmh HTN who lives in [place] presents with left frontal mass, likely meningioma. Per son, meningioma was diagnosed upon CT head performed for memory loss and confusion. Son reports that confusion has been ongoing for several months. Patient admits to mild headache but denies diplopia, blurry vision, nausea, vomiting, extremity numbness or weakness and slurred speech. Patient is preop for bifrontal craniotomy for meningioma resection 10/27.  (26 Oct 2022 10:58)    COURSE IN THE HOSPITAL:  10/26 admitted to [facility]    Past Medical History: Meningioma Hypertension H/O hyperthyroidism   Allergies:  No Known Drug Allergies strawberry (Hives)  Home meds:   ·	aspirin 81 mg oral tablet, chewable: 0.5 tab(s) orally once a day, As Needed  ·	(last taken over 1 week ago)  ·	lisinopril 10 mg oral tablet: 1 tab(s) orally once a day  ·	metoprolol succinate 25 mg oral tablet, extended release: 1 tab(s) orally 2 times a day    PHYSICAL EXAMINATION  T(C): 35.7 (10-27 @ 13:30), Max: 36.7 (10-26 @ 21:05) HR: 83 (10-27 @ 13:45) (65 - 90) BP: 131/74 (10-27 @ 13:45) (118/69 - 163/96) RR: 19 (10-27 @ 13:45) (16 - 19) SpO2: 96% (10-27 @ 13:45) (93% - 99%)   NEUROLOGIC EXAMINATION:  Patient is    GENERAL: not intubated, not in cardiorespiratory distress  EENT:  anicteric  CARDIOVASCULAR: (+) S1 S2, normal rate and regular rhythm  PULMONARY: clear to auscultation bilaterally  ABDOMEN: soft, nontender with normoactive bowel sounds  EXTREMITIES: no edema  SKIN: no rash    LABS:                        9.9    7.66  )-----------( 150      ( 27 Oct 2022 13:34 )             30.8     145  |  109<H>  |  13  ----------------------------<  80  4.3   |  27  |  0.79    Ca    9.2      26 Oct 2022 09:15  Phos  3.4     10-26  Mg     1.9     10-26    TPro  7.2  /  Alb  4.0  /  TBili  0.4  /  DBili  x   /  AST  18  /  ALT  8<L>  /  AlkPhos  74  10-26    10-27 @ 07:01  -  10-27 @ 14:03  IN: 150 mL / OUT: 150 mL / NET: 0 mL    Bacteriology:  CSF studies:  EEG:  Neuroimaging:  Other imaging:     MEDICATIONS: 10-27    ·	ceFAZolin   IVPB 2000 IV Intermittent every 8 hours  ·	acetaminophen   IVPB .. 1000 IV Intermittent every 8 hours  ·	levETIRAcetam 500 Oral every 12 hours  ·	lisinopril 10 milliGRAM(s) Oral daily  ·	metoprolol tartrate 25 milliGRAM(s) Oral every 12 hours  ·	pantoprazole    Tablet 40 Oral before breakfast  ·	senna 2 Oral at bedtime  ·	dexAMETHasone  Injectable 4 IV Push every 6 hours  ·	insulin lispro (ADMELOG) corrective regimen sliding scale  SubCutaneous Before meals and at bedtime  ·	bisacodyl 5 Oral daily PRN  ·	melatonin 5 Oral at bedtime PRN  ·	oxyCODONE    IR 5 Oral every 4 hours PRN  ·	oxyCODONE    IR 10 Oral every 4 hours PRN    IV FLUIDS:  DRIPS:  DIET:  Lines:  Drains:      Wounds:    CODE STATUS:  Full Code                       GOALS OF CARE:  aggressive                      DISPOSITION:  ICU =================================  NEUROCRITICAL CARE ATTENDING NOTE  =================================    VICKIE JARRETT   MRN-[id number]  Summary:  77y/F with pmh HTN who lives in [place] presents with left frontal mass, likely meningioma. Per son, meningioma was diagnosed upon CT head performed for memory loss and confusion. Son reports that confusion has been ongoing for several months. Patient admits to mild headache but denies diplopia, blurry vision, nausea, vomiting, extremity numbness or weakness and slurred speech. Patient is preop for bifrontal craniotomy for meningioma resection 10/27.  (26 Oct 2022 10:58)    COURSE IN THE HOSPITAL:  10/26 admitted to [facility]  10/27 s/p OR    Past Medical History: Meningioma Hypertension H/O hyperthyroidism   Allergies:  No Known Drug Allergies strawberry (Hives)  Home meds:   ·	aspirin 81 mg oral tablet, chewable: 0.5 tab(s) orally once a day, As Needed  ·	(last taken over 1 week ago)  ·	lisinopril 10 mg oral tablet: 1 tab(s) orally once a day  ·	metoprolol succinate 25 mg oral tablet, extended release: 1 tab(s) orally 2 times a day    PHYSICAL EXAMINATION  T(C): 35.7 (10-27 @ 13:30), Max: 36.7 (10-26 @ 21:05) HR: 83 (10-27 @ 13:45) (65 - 90) BP: 131/74 (10-27 @ 13:45) (118/69 - 163/96) RR: 19 (10-27 @ 13:45) (16 - 19) SpO2: 96% (10-27 @ 13:45) (93% - 99%)   NEUROLOGIC EXAMINATION:  Patient is  AOx1, confused, TESSIE, perseverates, moves all 4s  GENERAL: not intubated, not in cardiorespiratory distress  EENT:  anicteric  CARDIOVASCULAR: (+) S1 S2, normal rate and regular rhythm  PULMONARY: clear to auscultation bilaterally  ABDOMEN: soft, nontender with normoactive bowel sounds  EXTREMITIES: no edema  SKIN: no rash    LABS:                        9.9    7.66  )-----------( 150      ( 27 Oct 2022 13:34 )             30.8     145  |  109<H>  |  13  ----------------------------<  80  4.3   |  27  |  0.79    Ca    9.2      26 Oct 2022 09:15  Phos  3.4     10-26  Mg     1.9     10-26    TPro  7.2  /  Alb  4.0  /  TBili  0.4  /  DBili  x   /  AST  18  /  ALT  8<L>  /  AlkPhos  74  10-26    10-27 @ 07:01  -  10-27 @ 14:03  IN: 150 mL / OUT: 150 mL / NET: 0 mL    Bacteriology:  CSF studies:  EEG:  Neuroimaging:  Other imaging:     MEDICATIONS: 10-27    ·	ceFAZolin   IVPB 2000 IV Intermittent every 8 hours  ·	acetaminophen   IVPB .. 1000 IV Intermittent every 8 hours  ·	levETIRAcetam 500 Oral every 12 hours  ·	lisinopril 10 milliGRAM(s) Oral daily  ·	metoprolol tartrate 25 milliGRAM(s) Oral every 12 hours  ·	pantoprazole    Tablet 40 Oral before breakfast  ·	senna 2 Oral at bedtime  ·	dexAMETHasone  Injectable 4 IV Push every 6 hours  ·	insulin lispro (ADMELOG) corrective regimen sliding scale  SubCutaneous Before meals and at bedtime  ·	bisacodyl 5 Oral daily PRN  ·	melatonin 5 Oral at bedtime PRN  ·	oxyCODONE    IR 5 Oral every 4 hours PRN  ·	oxyCODONE    IR 10 Oral every 4 hours PRN    IV FLUIDS:  DRIPS:  DIET:  Lines:  Drains:      Wounds:    CODE STATUS:  Full Code                       GOALS OF CARE:  aggressive                      DISPOSITION:  ICU

## 2022-10-27 NOTE — PROGRESS NOTE ADULT - SUBJECTIVE AND OBJECTIVE BOX
NSCU ATTENDING -- ADDITIONAL PROGRESS NOTE    Nighttime rounds were performed -- please refer to earlier Progress Note for HPI details.      ICU Vital Signs Last 24 Hrs  T(C): 36 (27 Oct 2022 21:25), Max: 36.5 (27 Oct 2022 04:47)  T(F): 96.8 (27 Oct 2022 21:25), Max: 97.7 (27 Oct 2022 04:47)  HR: 82 (27 Oct 2022 22:00) (65 - 90)  BP: 122/67 (27 Oct 2022 22:00) (118/69 - 160/90)  BP(mean): 90 (27 Oct 2022 22:00) (89 - 118)  ABP: 114/47 (27 Oct 2022 22:00) (114/47 - 156/73)  ABP(mean): 72 (27 Oct 2022 22:00) (72 - 109)  RR: 16 (27 Oct 2022 22:00) (12 - 19)  SpO2: 96% (27 Oct 2022 22:00) (93% - 100%)      10-27-22 @ 07:01  -  10-27-22 @ 22:38  --------------------------------------------------------  IN: 1112.5 mL / OUT: 1140 mL / NET: -27.5 mL      PHYSICAL EXAMINATION  NEUROLOGIC EXAMINATION:  Patient is Ox1 person only, confused speech, R 2mm, L3mm and reactive, perseverates, moves all 4 extremities  Waxes and wanes in exam. At times, arousable and makes needs known.      MEDICATIONS:  bisacodyl 5 milliGRAM(s) Oral daily PRN  ceFAZolin   IVPB 2000 milliGRAM(s) IV Intermittent every 8 hours  dexAMETHasone  Injectable 4 milliGRAM(s) IV Push every 6 hours  dexAMETHasone  Injectable   IV Push   dextrose 5%. 1000 milliLiter(s) (50 mL/Hr) IV Continuous <Continuous>  dextrose 50% Injectable 25 Gram(s) IV Push once  dextrose Oral Gel 15 Gram(s) Oral once PRN  glucagon  Injectable 1 milliGRAM(s) IntraMuscular once  hydrALAZINE Injectable 10 milliGRAM(s) IV Push every 3 hours PRN  insulin lispro (ADMELOG) corrective regimen sliding scale   SubCutaneous Before meals and at bedtime  ketorolac   Injectable 15 milliGRAM(s) IV Push every 6 hours PRN  levETIRAcetam  IVPB 500 milliGRAM(s) IV Intermittent every 12 hours  lisinopril 10 milliGRAM(s) Oral daily  melatonin 5 milliGRAM(s) Oral at bedtime PRN  metoprolol tartrate 25 milliGRAM(s) Oral every 12 hours  oxyCODONE    IR 5 milliGRAM(s) Oral every 4 hours PRN  oxyCODONE    IR 10 milliGRAM(s) Oral every 4 hours PRN  pantoprazole    Tablet 40 milliGRAM(s) Oral before breakfast  senna 2 Tablet(s) Oral at bedtime  sodium chloride 0.9%. 1000 milliLiter(s) (75 mL/Hr) IV Continuous <Continuous>      LABS:                      9.9    7.66  )-----------( 150      ( 27 Oct 2022 13:34 )             30.8     10-27    143  |  x   |  10  ----------------------------<  148<H>  4.4   |  23  |  0.70    Ca    9.0      27 Oct 2022 22:03  Phos  4.1     10-27  Mg     1.7     10-27    TPro  5.9<L>  /  Alb  3.4  /  TBili  <0.2  /  DBili  x   /  AST  16  /  ALT  7<L>  /  AlkPhos  65  10-27    LIVER FUNCTIONS - ( 27 Oct 2022 13:34 )  Alb: 3.4 g/dL / Pro: 5.9 g/dL / ALK PHOS: 65 U/L / ALT: 7 U/L / AST: 16 U/L / GGT: x           ABG - ( 27 Oct 2022 09:23 )  pH, Arterial: 7.37  pH, Blood: x     /  pCO2: 36    /  pO2: 216   / HCO3: 21    / Base Excess: -4.0  /  SaO2: x           78 y/o F with:  1.	L parafalcine mass, meningioma, brain compression, cerebral edema, s/p bifrontal crani, resection (10/27/2022, Dr. Garnica) POD 0  2.	Hypertension  3.	History hyperthyroidism    PLAN:   Neurochecks Q1h, PRN pain meds with acetaminophen  CT post op- stable vasogenic edema with shift  Continue steroids, taper as per neurosurgery, f/u final histopathology, MRI on stepdown  Seizure prophylaxis:  levetiracetam 500mg PO BID, as per neurosurgery. If exam fluctuates, consider vEEG  PRN O2 support to keep sats >/=92%  SBP goal 100-140mm Hg, continue lisinopril, metoprolol  Blood sugar goals 140-180 mg/dL, continue insulin sliding scale  PPI for GI prophylaxis while on steroids; advance as tolerated  IVF until eating well. Na goal 145- 150. 250cc2%  Hb stable  VTE Prophylaxis: SCDs, no DVT chemoprophylaxis for now as patient is high risk for bleed (fresh post-op)  Inocencia doppler  ID: Afebrile, no leukocytosis, periop ancef - duration as per neurosurgery    Patient remains critically ill.    Additional 30 minutes of critical care time.

## 2022-10-27 NOTE — PATIENT PROFILE ADULT - FALL HARM RISK - HARM RISK INTERVENTIONS

## 2022-10-27 NOTE — BRIEF OPERATIVE NOTE - OPERATION/FINDINGS
Bifrontal craniotomy for removal of skull base lesion. Frontal sinus cranialized and stuffed with pericranium, autologous bone graft.

## 2022-10-27 NOTE — PROGRESS NOTE ADULT - ASSESSMENT
77y/F with  L parafalcine mass, meningioma, brain compression, cerebral edema, s/p bifrontal crani, resection (10/27/2022, Dr. Garnica)  Hypertension  h/o hyperthyroidism    PLAN:   NEURO: neurochecks q1h, PRN pain meds with acetaminophen  continue steroids, taper as per neurosurgery, f/u final histopathology, MRI on stepdown  seizure prophylaxis:  levetiracetam 500mg PO BID, as per neurosurgery   REHAB:  physical therapy evaluation and management    EARLY MOB:  bedrest    PULM:  PRN O2 support to keep sats >/=92%, skull base precautions: no nose blowing, drinking with a straw, or bending below waist; NO incentive spirometry   CARDIO:  SBP goal 100-140mm Hg, continue lisinopril, metoprolol  ENDO:  Blood sugar goals 140-180 mg/dL, continue insulin sliding scale  GI:  PPI for GI prophylaxis while on steroids  DIET: advance as tolerated  RENAL:  IVF until eating well  HEM/ONC: Hb stable  VTE Prophylaxis: SCDs, no DVT chemoprophylaxis for now as patient is high risk for bleed (fresh post-op); baseline dopp NEG  ID: afebrile, no leukocytosis, periop ancef - duration as per neurosurgery  Social: will update family    Active issues:  What's keeping patient in the ICU? fresh post-op  What is this patient's dispo plan? stepdown when stable    ATTENDING ATTESTATION:  I was physically present for the key portions of the evaluation and management (E/M) service provided.  I agree with the above history, physical and plan, which I have reviewed and edited where appropriate.    Patient at high risk for neurological deterioration or death due to:  ICU delirium, aspiration PNA, DVT / PE.  Critical care time:  I have personally provided 45 minutes of critical care time, excluding time spent on separate procedures.      Plan discussed with RN, house staff.

## 2022-10-27 NOTE — PATIENT PROFILE ADULT - FUNCTIONAL SCREEN CURRENT LEVEL: COMMUNICATION, MLM
2 = difficulty understanding (not related to language barrier) 2 = difficulty understanding and speaking (not related to language barrier)

## 2022-10-27 NOTE — PATIENT PROFILE ADULT - FUNCTIONAL ASSESSMENT - DAILY ACTIVITY 4.
Inpatient Rehabilitation Team Conference Note  Date: 8/26/2020  Time: 10:19 AM       Patient Name: Guy Jarrett     Medical Record Number: [id number]   YOB: 1949  Sex: Male      Room/Bed: 201/201D  Payor Info: Payor: IBC / Plan: [employer] BASIC / Product Type:  Managed Care /     Admitting Diagnosis: Brain metastases (CMS/HCC) [C79.31]   Admit Date/Time: 8/21/2020  5:44 PM  Admission Comments: No comment available     Primary Diagnosis: No Principal Problem: There is no principal problem currently on the Problem List. Please update the Problem List and refresh.  Principal Problem: No Principal Problem: There is no principal problem currently on the Problem List. Please update the Problem List and refresh.    Patient Active Problem List    Diagnosis Date Noted   • Arthritis 08/24/2020   • Metastatic melanoma (CMS/HCC) 08/21/2020   • Brain metastases (CMS/HCC) 08/21/2020   • HTN (hypertension) 08/21/2020   • Hyperlipidemia 08/21/2020   • S/P craniotomy 08/21/2020   • At high risk for deep venous thrombosis 08/21/2020   • At high risk for pressure injury of skin 08/21/2020   • Risk for falls 08/21/2020   • Seizure disorder (CMS/HCC) 08/21/2020   • Pain 08/21/2020   • Follow up 08/21/2020       Premorbid Status:  Dominant Hand: right  Ambulation: independent  Transferring: independent  Toileting: independent  Bathing: independent  Dressing: independent  Eating: independent  Communication: understands/communicates without difficulty  Swallowing: swallows foods/liquids without difficulty  Baseline Diet/Method of Nutritional Intake: thin liquids;regular solids  Assistive Device/Animal Currently Used at Home: none  Prior Level of Function Comment: Independent PTA. (+) . Enjoys golf and household projects.      Current Functional Status:  Mobility  Gait  [place], Gait: touching/steadying assist;close supervision;verbal cues  Assistive Device: none  Comment: 400' x 2 with min A/cl  s    Stairs  [place], Stairs: close supervision;verbal cues  Assistive Device: railing  Handrail Location: right side (ascending);left side (descending)  Number of Stairs: 16  Stair Height: 6 inches  Comment: VC for safety/sequencing    Wheelchair  Functional Mobility Training: forward propulsion  [place], Forward Propulsion: dependent (less than 25% patient effort)  Distance Propelled in Feet: 0 feet  Comment, Functional Mobility: Patient utilizing W/C for transportation purposes within hospital setting. Do not anticipate need for W/C for locomotion upon D/C from BMR.    Transfers  [place], Roll Left: close supervision  [place], Roll Right: supervision  [place], Supine to Sit: supervision  [place], Sit to Supine: supervision  Assistive Device (Bed Mobility): none  Comment (Bed Mobility): on mat table  Comment: Pt engaged in sit to stand transitions focusing on WBing through LLE, required MIN A to complete.   [place], Bed to Chair: moderate assist (50-74% patient effort);verbal cues  Verbal Cues: hand placement;safety;technique  Assistive Device: other (see comments) (none)  Comment: Min/Mod A SPT without AD  [place], Chair to Bed: moderate assist (50-74% patient effort);verbal cues  Verbal Cues: hand placement;safety;technique  Comment: Min/Mod A SPT without AD  [place], Sit to Stand Transfer: close supervision;verbal cues  Verbal Cues: safety  Assistive Device: none  Comment: Cl S with RW, steadying assist without AD  [place], Stand to Sit Transfer: close supervision;verbal cues  Verbal Cues: preparatory posture;safety  Assistive Device: none  Comment: Cl S with RW, steadying assist without AD  [place], Stand Pivot/Stand Step Transfer: close supervision  Verbal Cues: safety  Assistive Device: walker, front-wheeled  Comment: ambulatory approach    Transfer Technique: stand pivot  [place], Toilet Transfer: close supervision  Assistive Device: cane,  straight;raised toilet seat;grab bars/safety frame  Comment: ambulatory approach, edu on toilet safety frame for home  Transfer Technique: stand pivot  [place], Shower Transfer: close supervision  Assistive Device: shower chair;grab bars/tub rail;cane, straight  Comment: ambulatory approach, CLS in simulated walk-in shower. edu on DME recommendation including shower chair and GB - pt agreeable      Self Care  Self-Performance: don;obtains clothes;threads left arm, shirt;threads right arm, shirt;pulls shirt over head/around back;pulls shirt down/adjusts  [place] Assistance: threads left arm, shirt;pulls shirt down/adjusts  Adaptive Equipment: none  Self-Performance: don;obtains clothes;threads left leg, underpants;threads right leg, underpants;pulls underpants up or down;threads left leg, pants/shorts;threads right leg, pants/shorts;pulls pants/shorts up or down;dons/doffs left sock;dons/doffs right sock;dons/doffs left shoe;dons/doffs right shoe;shoelaces, left;shoelaces, right  [place] Assistance: obtains clothes;threads left leg, underpants;threads right leg, underpants;pulls underpants up or down;threads left leg, pants/shorts;threads right leg, pants/shorts;pulls pants/shorts up or down;dons/doffs left sock;dons/doffs right sock  Adaptive Equipment: none  [place]: close supervision  Self-Performance: chest;left arm;right arm;front perineal area;abdomen;buttocks;left upper leg;right upper leg;left lower leg, including foot;right lower leg, including foot  Adaptive Equipment: grab bar/tub rail;hand-held shower spray hose;shower chair  [place] Assistance: obtain supplies  Comment: S while seated, CLS in stand to wash buttocks  [place]: close supervision;supervision  Adaptive Equipment: grab bar/safety frame;raised toilet seat  Comment: S while seated, CLS in stand for CM  Self-Performance: washes, rinses and dries face;washes, rinses and dries hands;oral care (brushing teeth, cleaning  dentures);brushes/clark hair;shaves face;applies deodorant  [place]: supervision  Comment: declined oral care    Cognition  Affect/Mental Status (Cognitive): WFL  Orientation Status (Cognition): oriented x 4  Follows Commands (Cognition): WFL  Cognitive Function (Cognitive): WFL;other (see comments) (appears attention is pt baseline )  Comment, Cognition: A&O x4. Able to follow all instructions for mobility assessments.  City: 2-->logical cuing  Kind of Place: 3-->spontaneous/free recall  Name of [facility]: 3-->spontaneous/free recall  Month: 3-->spontaneous/free recall  Date: 3-->spontaneous/free recall  Year: 3-->spontaneous/free recall  Day of Week: 3-->spontaneous/free recall  Clock Time: 3-->spontaneous/free recall  Etiology/Event: 3-->spontaneous/free recall  Pathology Deficits: 3-->spontaneous/free recall  Total Score: 29  Comment: AOx4     Communication  Speech Intelligibility (Motor Speech): WNL;conversational level;other (see comments) (pt speaks at rapid rate  )  Conversational Level, Speech Intelligibility (Motor Speech): intact  Articulation (Motor Speech): WNL  Speech Fluency (Motor Speech): WNL  Vocal Loudness (Motor Speech): WNL  Breath Support (Motor Speech): intact  Rate/Prosody (Motor Speech): WNL  Resonance (Motor Speech): WNL  Comment, Motor Speech Assessment: 100% intelligible at the conversational level   Follows Commands (Auditory Comprehension): 2-step commands  Yes/No Questions (Auditory Comprehension): WFL;complex questions;simple/factual questions;biographical/personal questions  Complex Questions (Auditory Comprehension): intact  Simple/Factual Questions (Auditory Comprehension): intact;over 90% accuracy  Biographical/Personal Questions (Auditory Comprehension): intact;over 90% accuracy  Comment, Assessment (Auditory Comprehension): Auditory comprehension for complex language WFL. Pt self advocates if needs information repeated. Pt reports this as baseline. + for all tasks on initial  evaluation.       Frequency of Treatment (OT): 5-7 times per week, 60-90 minutes per day  Frequency of Treatment (PT): 60-90 minutes per day, 5-7 times per week  Therapy Frequency (SLP): other (see comments)(ST not recommended upon completition of IE.)  Rec Therapy Frequency of Treatment: 60-90 minutes per session, 3-5 times per week, 30 minutes per session    Weekly Outcome Summaries:  Weekly Progress Summary (PT)  Progress Toward Functional Goals (PT): progressing toward functional goals as expected  Weekly Outcome Statement: Patient has made significant improvements in PT since initial evaluation. He has improved from Min/Mod A for transfers to steadying assistance. Gait has improved from assist of 2 to Min A to Cl S of 1 person, assist level dependent on device used (Cl S with RW, Min A without AD). He demonstrates significant improvement in LLE strength (e.g., DF improved from 1/5 at initial evaluation to 3-/5 currently). [place] balance Scale score of 36/56 indicates increased fall risk at this time. The patient will continue to benefit from skilled I/P PT in order to address below-stated deficits, to ensure the highest level of safety and independence upon discharge.   Impairments Continuing to Limit Function: decreased strength, hemiparesis/hemiplegia, impaired balance, impaired coordination, impaired motor control, impaired safety awareness  Recommendations (PT): Continue I/P PT 1-2 hours/day 5+ days/week with interventions including: balance training;bed mobility training;gait training;motor coordination training;neuromuscular re-education;orthotic fitting/training;patient/family education;stair training;strengthening;stretching;transfer training  Weekly Progress Summary (OT)  Progress Toward Functional Goals (OT): progressing toward functional goals as expected  Weekly Outcome Statement: Completed MoCA scoring 24/30 (WFL is greater or equal to 26/30), with deficits in language and delayed recall. Noted with  "B/L coordination deficits; impaired FMC on RUE compared to LUE due to hx of cervical deficits PTA. Vision: saccadic intrusions and anisocoria (L larger than R). Pt with impaired safety awareness, requires frequent cues for safety and improving pacing during HHM and functional transfers. Difficulties with motor planning.   Impairments Continuing to Limit Function: abnormal muscle tone, decreased strength, impaired balance, impaired coordination, impaired motor control, impaired postural control, impaired safety awareness  Recommendations (OT): Pt would continued to benefit from skilled OT services to enhance independence in ADLs and other functional tasks.   Weekly Outcome Summary (Interdisciplinary)  Weekly Outcome Statement: Patient was oriented to self, situation, date, and location. He denied significant changes in cognitive abilities. He described his mood as \"pretty good\" although he noted that he can be melancholy when thinking about his prognosis and the impact it may have on his family. He was appropriately tearful when discussing emotional topics. He indicated that talking to family/friends, reading, and using humor often helps him cope with distressing thoughts. He identified goals which included traveling, golf, and seeing friends. He described his sleep as okay, his energy as okay, and his appetite as good. Psychology will continue to follow while he is inpatient.     Problem Resolution:   Bladder Management: fluid intake encouraged  Strategies/Techniques (Bowel Management): upright positioning to facilitate evacuation  Communication Enhancement Strategies: call light answered in person  Activities: integration into community life(vacation home in [facility])  Counseling (Coping Strategies): active listening utilized   Identified Problems & Impairments: (not recorded)  Comment, Identified Problems & Impairments: (not recorded)  Resolved Problems & Impairments: (not recorded)  Comment, Resolved Problems & " Impairments: (not recorded) Team Weekly Outcome Statement: Alert oriented Continent   No complaints of pain. Staples wash daily  min A.    reg thins  BID accuchecks.   (08/26/20 1013)        Goals/Support System:  Patient/Family Goals  Patient's Goals For Discharge: take care of myself at home, return home, return to all previous roles/activities  Family Goals For Discharge: patient able to return to all previous activities/roles  Family/Support System  Health Advocacy: self-advocacy for health, family advocates for patient's health  Caregiver Training  Caregiver(s) to be Trained: spouse/significant other  Visit Schedule: to be arranged     IRF PT Goals      Most Recent Value   Bed Mobility Goal 1   Activity/Assistive Device  sit to supine/supine to sit, rolling to left, rolling to right at 08/26/2020 0836   [place]  modified independence at 08/26/2020 0836   Time Frame  short-term goal (STG), 1 week at 08/26/2020 0836   Progress/Outcome  goal met, goal revised this date at 08/26/2020 0836   Bed Mobility Goal 2   Activity/Assistive Device  sit to supine/supine to sit, rolling to left, rolling to right at 08/22/2020 1100   [place]  modified independence at 08/22/2020 1100   Time Frame  long-term goal (LTG), 21 days or less at 08/22/2020 1100   Progress/Outcome  goal ongoing at 08/26/2020 0836   Transfer Goal 1   Activity/Assistive Device  sit-to-stand/stand-to-sit, bed-to-chair/chair-to-bed, stand pivot, no assistive device at 08/26/2020 0836   [place]  supervision required, verbal cues required at 08/26/2020 0836   Time Frame  short-term goal (STG), 1 week at 08/26/2020 0836   Progress/Outcome  goal met, goal revised this date at 08/26/2020 0836   Transfer Goal 2   Activity/Assistive Device  sit-to-stand/stand-to-sit, bed-to-chair/chair-to-bed, car transfer, stand pivot at 08/22/2020 1100   [place]  modified independence at 08/22/2020 1100   Time Frame  long-term goal (LTG), 21 days or less at  08/22/2020 1100   Progress/Outcome  goal ongoing at 08/26/2020 0836   Gait/Walking Locomotion Goal 1   Activity/Assistive Device  gait (walking locomotion), no assistive device at 08/26/2020 0836   Distance  200 feet at 08/26/2020 0836   [place]  supervision required, verbal cues required at 08/26/2020 0836   Time Frame  short-term goal (STG), 1 week at 08/26/2020 0836   Progress/Outcome  goal met, goal revised this date at 08/26/2020 0836   Gait/Walking Locomotion Goal 2   Activity/Assistive Device  gait (walking locomotion) at 08/22/2020 1100   Distance  200 feet at 08/22/2020 1100   [place]  modified independence at 08/22/2020 1100   Time Frame  long-term goal (LTG), 21 days or less at 08/22/2020 1100   Progress/Outcome  goal ongoing at 08/26/2020 0836   Stairs Goal 1   Activity/Assistive Device  ascending stairs, descending stairs, using handrail, right, step-to-step at 08/26/2020 0836   Number of Stairs  12 at 08/26/2020 0836   [place]  supervision required, verbal cues required at 08/26/2020 0836   Time Frame  short-term goal (STG), 1 week at 08/26/2020 0836   Progress/Outcome  goal met, goal revised this date at 08/26/2020 0836   Stairs Goal 2   Activity/Assistive Device  ascending stairs, descending stairs, using handrail, right, step-to-step at 08/22/2020 1100   Number of Stairs  12 at 08/22/2020 1100   [place]  modified independence at 08/22/2020 1100   Time Frame  long-term goal (LTG), 21 days or less at 08/22/2020 1100   Progress/Outcome  goal ongoing at 08/26/2020 0836        IRF OT Goals      Most Recent Value   Transfer Goal 1   Activity/Assistive Device  toilet at 08/25/2020 1434   [place]  supervision required at 08/25/2020 1434   Time Frame  short-term goal (STG), 1 week at 08/25/2020 1434   Progress/Outcome  goal revised this date at 08/25/2020 1434   Transfer Goal 2   Activity/Assistive Device  toilet at 08/22/2020 0704   [place]  modified independence at 08/22/2020  0704   Time Frame  long-term goal (LTG), 4 weeks at 08/22/2020 0704   Progress/Outcome  goal ongoing at 08/25/2020 1434   Transfer Goal 3   Activity/Assistive Device  shower at 08/25/2020 1434   [place]  supervision required at 08/25/2020 1434   Time Frame  short-term goal (STG), 1 week at 08/25/2020 1434   Progress/Outcome  goal revised this date at 08/25/2020 1434   Transfer Goal 4   Activity/Assistive Device  shower at 08/22/2020 0704   [place]  modified independence at 08/22/2020 0704   Time Frame  long-term goal (LTG), 4 weeks at 08/22/2020 0704   Progress/Outcome  goal ongoing at 08/25/2020 1434   Bathing Goal 1   Activity/Assistive Device  bathing skills, all at 08/25/2020 1434   [place]  supervision required at 08/25/2020 1434   Time Frame  short-term goal (STG), 1 week at 08/25/2020 1434   Progress/Outcome  goal revised this date at 08/25/2020 1434   Bathing Goal 2   Activity/Assistive Device  bathing skills, all at 08/22/2020 0704   [place]  modified independence at 08/22/2020 0704   Time Frame  long-term goal (LTG), 4 weeks at 08/22/2020 0704   Progress/Outcome  goal ongoing at 08/25/2020 1434   UB Dressing Goal 1   Activity/Assistive Device  upper body dressing at 08/25/2020 1434   [place]  supervision required at 08/25/2020 1434   Time Frame  short-term goal (STG), 1 week at 08/25/2020 1434   Strategies/Barriers  including item retrieval at 08/25/2020 1434   Progress/Outcome  goal revised this date at 08/25/2020 1434   UB Dressing Goal 2   Activity/Assistive Device  upper body dressing at 08/22/2020 0704   [place]  modified independence at 08/22/2020 0704   Time Frame  long-term goal (LTG), 4 weeks at 08/22/2020 0704   Progress/Outcome  goal ongoing at 08/25/2020 1434   LB Dressing Goal 1   Activity/Assistive Device  lower body dressing at 08/25/2020 1434   [place]  supervision required at 08/25/2020 1434   Time Frame  short-term goal (STG), 1 week at 08/25/2020 1434    Strategies/Barriers  including item retrieval at 08/25/2020 1434   Progress/Outcome  goal revised this date at 08/25/2020 1434   LB Dressing Goal 2   Activity/Assistive Device  lower body dressing at 08/22/2020 0704   [place]  modified independence at 08/22/2020 0704   Time Frame  long-term goal (LTG), 4 weeks at 08/22/2020 0704   Progress/Outcome  goal ongoing at 08/25/2020 1434   Grooming Goal 1   Activity/Assistive Device  grooming skills, all at 08/25/2020 1434   [place]  supervision required at 08/25/2020 1434   Time Frame  short-term goal (STG), 1 week at 08/25/2020 1434   Progress/Outcome  goal revised this date at 08/25/2020 1434   Grooming Goal 2   Activity/Assistive Device  grooming skills, all at 08/22/2020 0704   [place]  modified independence at 08/22/2020 0704   Time Frame  long-term goal (LTG), 4 weeks at 08/22/2020 0704   Progress/Outcome  goal ongoing at 08/25/2020 1434   Toileting Goal 1   Activity/Assistive Device  toileting skills, all at 08/25/2020 1434   [place]  supervision required at 08/25/2020 1434   Time Frame  short-term goal (STG), 1 week at 08/25/2020 1434   Progress/Outcome  goal revised this date at 08/25/2020 1434   Toileting Goal 2   Activity/Assistive Device  toileting skills, all at 08/22/2020 0704   [place]  modified independence at 08/22/2020 0704   Time Frame  long-term goal (LTG), 4 weeks at 08/22/2020 0704   Progress/Outcome  goal ongoing at 08/25/2020 1434          Risk for Complications  DVT: Moderate  Falls: Moderate  Infection: Moderate      The patient's medical prognosis is excellent to achieve the stated goals below.    Expected Level of Function  Expected Functional Improvement: cognitive function;communication;mobility;motor dysfunction;safety;self-care  Self-Care: Independent  Sphincter Control: Independent  Transfers: Independent  Locomotion: Independent  Communication: Independent  Social Cognition: Independent       Discharge  Planning:  Anticipated Discharge Disposition: home with outpatient services;home with home health services  Type of Outpatient Services: physical therapy ([facility] )    Equipment/Device Needs at Discharge  Assistive Device/Animal Currently Used at Home: none  Discharge Planning  Does the patient need discharge transport arranged?: No    Anticipated Discharge Date: 8/29/2020    Needs Identified:  Community Reintegration  Activities: integration into community life(vacation home in [facility])  Barriers: type of disability      Team Members Present     Rehab Attending Present:  Brando Love DO    Care Coordinator Present:  Gabriela Solis LSW    Nurse Present:  Clau Fowler LPN    OT Present:  Bernie Pagan OT    PT Present:  [place], Leslie, PT    SLP Present:  Tessa Vincent CCC-SLP    Psychologist Present:  Johnna Allen PSY.D    Other Team Member Present:  Edna Schneider, RN        Next Team Conference Date: 09/02/20   4 = No assist / stand by assistance 1 = Total assistance

## 2022-10-28 LAB
A1C WITH ESTIMATED AVERAGE GLUCOSE RESULT: 5.1 % — SIGNIFICANT CHANGE UP (ref 4–5.6)
ANION GAP SERPL CALC-SCNC: 9 MMOL/L — SIGNIFICANT CHANGE UP (ref 5–17)
BUN SERPL-MCNC: 11 MG/DL — SIGNIFICANT CHANGE UP (ref 7–23)
CALCIUM SERPL-MCNC: 8.8 MG/DL — SIGNIFICANT CHANGE UP (ref 8.4–10.5)
CHLORIDE SERPL-SCNC: 115 MMOL/L — HIGH (ref 96–108)
CO2 SERPL-SCNC: 23 MMOL/L — SIGNIFICANT CHANGE UP (ref 22–31)
CREAT SERPL-MCNC: 0.75 MG/DL — SIGNIFICANT CHANGE UP (ref 0.5–1.3)
EGFR: 83 ML/MIN/1.73M2 — SIGNIFICANT CHANGE UP
ESTIMATED AVERAGE GLUCOSE: 100 MG/DL — SIGNIFICANT CHANGE UP (ref 68–114)
GLUCOSE BLDC GLUCOMTR-MCNC: 105 MG/DL — HIGH (ref 70–99)
GLUCOSE BLDC GLUCOMTR-MCNC: 114 MG/DL — HIGH (ref 70–99)
GLUCOSE BLDC GLUCOMTR-MCNC: 116 MG/DL — HIGH (ref 70–99)
GLUCOSE BLDC GLUCOMTR-MCNC: 121 MG/DL — HIGH (ref 70–99)
GLUCOSE SERPL-MCNC: 130 MG/DL — HIGH (ref 70–99)
HCT VFR BLD CALC: 30.2 % — LOW (ref 34.5–45)
HGB BLD-MCNC: 9.8 G/DL — LOW (ref 11.5–15.5)
MCHC RBC-ENTMCNC: 22.3 PG — LOW (ref 27–34)
MCHC RBC-ENTMCNC: 32.5 GM/DL — SIGNIFICANT CHANGE UP (ref 32–36)
MCV RBC AUTO: 68.6 FL — LOW (ref 80–100)
NRBC # BLD: 0 /100 WBCS — SIGNIFICANT CHANGE UP (ref 0–0)
PLATELET # BLD AUTO: 164 K/UL — SIGNIFICANT CHANGE UP (ref 150–400)
POTASSIUM SERPL-MCNC: 4.1 MMOL/L — SIGNIFICANT CHANGE UP (ref 3.5–5.3)
POTASSIUM SERPL-SCNC: 4.1 MMOL/L — SIGNIFICANT CHANGE UP (ref 3.5–5.3)
RBC # BLD: 4.4 M/UL — SIGNIFICANT CHANGE UP (ref 3.8–5.2)
RBC # FLD: 15.8 % — HIGH (ref 10.3–14.5)
SODIUM SERPL-SCNC: 147 MMOL/L — HIGH (ref 135–145)
T4 AB SER-ACNC: 8.25 UG/DL — SIGNIFICANT CHANGE UP (ref 4.5–11.7)
TSH SERPL-MCNC: 0.6 UIU/ML — SIGNIFICANT CHANGE UP (ref 0.27–4.2)
WBC # BLD: 15.68 K/UL — HIGH (ref 3.8–10.5)
WBC # FLD AUTO: 15.68 K/UL — HIGH (ref 3.8–10.5)

## 2022-10-28 PROCEDURE — 99291 CRITICAL CARE FIRST HOUR: CPT

## 2022-10-28 RX ORDER — CHLORHEXIDINE GLUCONATE 213 G/1000ML
1 SOLUTION TOPICAL
Refills: 0 | Status: DISCONTINUED | OUTPATIENT
Start: 2022-10-28 | End: 2022-10-29

## 2022-10-28 RX ORDER — ENOXAPARIN SODIUM 100 MG/ML
40 INJECTION SUBCUTANEOUS EVERY 24 HOURS
Refills: 0 | Status: DISCONTINUED | OUTPATIENT
Start: 2022-10-28 | End: 2022-11-02

## 2022-10-28 RX ORDER — SODIUM CHLORIDE 9 MG/ML
1000 INJECTION INTRAMUSCULAR; INTRAVENOUS; SUBCUTANEOUS
Refills: 0 | Status: DISCONTINUED | OUTPATIENT
Start: 2022-10-28 | End: 2022-10-29

## 2022-10-28 RX ADMIN — Medication 15 MILLIGRAM(S): at 11:47

## 2022-10-28 RX ADMIN — Medication 650 MILLIGRAM(S): at 17:37

## 2022-10-28 RX ADMIN — Medication 4 MILLIGRAM(S): at 06:32

## 2022-10-28 RX ADMIN — Medication 100 MILLIGRAM(S): at 23:04

## 2022-10-28 RX ADMIN — SENNA PLUS 2 TABLET(S): 8.6 TABLET ORAL at 21:41

## 2022-10-28 RX ADMIN — Medication 15 MILLIGRAM(S): at 11:26

## 2022-10-28 RX ADMIN — Medication 10 MILLIGRAM(S): at 04:02

## 2022-10-28 RX ADMIN — Medication 100 MILLIGRAM(S): at 07:47

## 2022-10-28 RX ADMIN — OXYCODONE HYDROCHLORIDE 5 MILLIGRAM(S): 5 TABLET ORAL at 10:30

## 2022-10-28 RX ADMIN — Medication 650 MILLIGRAM(S): at 07:48

## 2022-10-28 RX ADMIN — ENOXAPARIN SODIUM 40 MILLIGRAM(S): 100 INJECTION SUBCUTANEOUS at 21:40

## 2022-10-28 RX ADMIN — Medication 4 MILLIGRAM(S): at 17:00

## 2022-10-28 RX ADMIN — LISINOPRIL 10 MILLIGRAM(S): 2.5 TABLET ORAL at 06:32

## 2022-10-28 RX ADMIN — Medication 10 MILLIGRAM(S): at 15:10

## 2022-10-28 RX ADMIN — LEVETIRACETAM 400 MILLIGRAM(S): 250 TABLET, FILM COATED ORAL at 06:32

## 2022-10-28 RX ADMIN — PANTOPRAZOLE SODIUM 40 MILLIGRAM(S): 20 TABLET, DELAYED RELEASE ORAL at 06:32

## 2022-10-28 RX ADMIN — Medication 25 MILLIGRAM(S): at 06:31

## 2022-10-28 RX ADMIN — Medication 650 MILLIGRAM(S): at 17:00

## 2022-10-28 RX ADMIN — Medication 100 MILLIGRAM(S): at 15:24

## 2022-10-28 RX ADMIN — Medication 650 MILLIGRAM(S): at 08:30

## 2022-10-28 RX ADMIN — Medication 25 MILLIGRAM(S): at 17:01

## 2022-10-28 RX ADMIN — Medication 4 MILLIGRAM(S): at 23:03

## 2022-10-28 RX ADMIN — Medication 4 MILLIGRAM(S): at 11:26

## 2022-10-28 RX ADMIN — SODIUM CHLORIDE 75 MILLILITER(S): 5 INJECTION, SOLUTION INTRAVENOUS at 00:36

## 2022-10-28 RX ADMIN — OXYCODONE HYDROCHLORIDE 5 MILLIGRAM(S): 5 TABLET ORAL at 09:28

## 2022-10-28 RX ADMIN — LEVETIRACETAM 400 MILLIGRAM(S): 250 TABLET, FILM COATED ORAL at 17:23

## 2022-10-28 NOTE — DIETITIAN INITIAL EVALUATION ADULT - OTHER INFO
77 year old female with pmh HTN found to have left frontal brain mass, likely meningioma, on workup for confusion and memory loss. Patient is now s/p bifrontal craniotomy for meningioma resection (frozen: meningioma) 10/27/22. On assesment, pt resting in bed noted to be lethargic/ disoriented this am and assessment was overall limited. Diet currently advanced to FLD tolerating PO well, but unable to quantify %PO at this time- RD to follow. No n/v/d/c. No abd distention or discomfort noted. Skin WDL, giselle score 15. No pain noted at this time. Unable to assess UBW nor appetite at this time. Allergic to strawberries per EMR. Education deferred at this time. RD to follow.

## 2022-10-28 NOTE — PROGRESS NOTE ADULT - SUBJECTIVE AND OBJECTIVE BOX
S/Overnight events: Pt seen and evaluated at bedside. Appeared lethargic, not following commands. Contacted Dr. Garnica, ordered CTH. Showed significant edema.       Hospital Course:   10/27: POD# 0 S/P bifrontal craniotomy for removal of skull base mass (frozen: meningioma.) Postop, Pt reports mild headache otherwise neurologically stable. Waxing/waning neuro exam. CTH stable, but with significant edema. Na 143, given 250cc 3% bolus.       Vital Signs Last 24 Hrs  T(C): 36 (27 Oct 2022 21:25), Max: 36.5 (27 Oct 2022 04:47)  T(F): 96.8 (27 Oct 2022 21:25), Max: 97.7 (27 Oct 2022 04:47)  HR: 82 (28 Oct 2022 00:00) (65 - 90)  BP: 131/63 (28 Oct 2022 00:00) (118/69 - 160/90)  BP(mean): 91 (28 Oct 2022 00:00) (89 - 118)  RR: 16 (28 Oct 2022 00:00) (12 - 19)  SpO2: 97% (28 Oct 2022 00:00) (93% - 100%)    Parameters below as of 28 Oct 2022 00:00  Patient On (Oxygen Delivery Method): room air        I&O's Detail    27 Oct 2022 07:01  -  28 Oct 2022 01:28  --------------------------------------------------------  IN:    IV PiggyBack: 450 mL    sodium chloride 0.9%: 787.5 mL    sodium chloride 3%: 150 mL  Total IN: 1387.5 mL    OUT:    Bulb (mL): 35 mL    Bulb (mL): 160 mL    Indwelling Catheter - Urethral (mL): 1095 mL  Total OUT: 1290 mL    Total NET: 97.5 mL        I&O's Summary    27 Oct 2022 07:01  -  28 Oct 2022 01:28  --------------------------------------------------------  IN: 1387.5 mL / OUT: 1290 mL / NET: 97.5 mL        PHYSICAL EXAM:  General: Lying on stretcher, NAD.  Skin: No rashes or lesions.  HEENT: PERRL, brisk. Face symmetrical. Head wrap in place, c/d/i.  Cardiac: regular rate and rhythm.   lungs: No respiratory distress, good chest rise.  Neurological: AOx0. Does not open eyes, does not follow commands. Groans when asked questions or do perform commands. Unable to assess strength or sensation  Extremities: Warm, well perfused.     TUBES/LINES:  [x] A-line  [x] sebastian  [x] SG JUN x2    DIET:  [x] NPO  [] Mechanical  [] Tube feeds    LABS:                        9.9    7.66  )-----------( 150      ( 27 Oct 2022 13:34 )             30.8     10-27    143  |  110<H>  |  10  ----------------------------<  148<H>  4.4   |  23  |  0.70    Ca    9.0      27 Oct 2022 22:03  Phos  4.1     10-27  Mg     1.7     10-27    TPro  5.9<L>  /  Alb  3.4  /  TBili  <0.2  /  DBili  x   /  AST  16  /  ALT  7<L>  /  AlkPhos  65  10-27    PT/INR - ( 27 Oct 2022 13:34 )   PT: 16.4 sec;   INR: 1.37          PTT - ( 27 Oct 2022 13:34 )  PTT:27.6 sec        CAPILLARY BLOOD GLUCOSE      POCT Blood Glucose.: 139 mg/dL (27 Oct 2022 22:02)  POCT Blood Glucose.: 150 mg/dL (27 Oct 2022 16:43)      Drug Levels: [] N/A    CSF Analysis: [] N/A      Allergies    No Known Drug Allergies  strawberry (Hives)    Intolerances      MEDICATIONS:  Antibiotics:  ceFAZolin   IVPB 2000 milliGRAM(s) IV Intermittent every 8 hours    Neuro:  acetaminophen     Tablet .. 650 milliGRAM(s) Oral every 6 hours PRN  ketorolac   Injectable 15 milliGRAM(s) IV Push every 6 hours PRN  levETIRAcetam  IVPB 500 milliGRAM(s) IV Intermittent every 12 hours  melatonin 5 milliGRAM(s) Oral at bedtime PRN  oxyCODONE    IR 5 milliGRAM(s) Oral every 4 hours PRN  oxyCODONE    IR 10 milliGRAM(s) Oral every 4 hours PRN    Anticoagulation:    OTHER:  bisacodyl 5 milliGRAM(s) Oral daily PRN  dexAMETHasone  Injectable 4 milliGRAM(s) IV Push every 6 hours  dexAMETHasone  Injectable   IV Push   dextrose 50% Injectable 25 Gram(s) IV Push once  dextrose Oral Gel 15 Gram(s) Oral once PRN  glucagon  Injectable 1 milliGRAM(s) IntraMuscular once  hydrALAZINE Injectable 10 milliGRAM(s) IV Push every 3 hours PRN  insulin lispro (ADMELOG) corrective regimen sliding scale   SubCutaneous Before meals and at bedtime  lisinopril 10 milliGRAM(s) Oral daily  metoprolol tartrate 25 milliGRAM(s) Oral every 12 hours  pantoprazole    Tablet 40 milliGRAM(s) Oral before breakfast  senna 2 Tablet(s) Oral at bedtime    IVF:  dextrose 5%. 1000 milliLiter(s) IV Continuous <Continuous>  sodium chloride 0.9%. 1000 milliLiter(s) IV Continuous <Continuous>  sodium chloride 3%. 250 milliLiter(s) IV Continuous <Continuous>    CULTURES:    RADIOLOGY & ADDITIONAL TESTS:  < from: CT Head No Cont (10.27.22 @ 21:00) >  IMPRESSION:  Surgical changes for left frontal mass resection. No evidence of   postsurgical  complication.    < end of copied text >    ASSESSMENT:  77 year old female with pmh HTN found to have left frontal brain mass, likely meningioma, on workup for confusion and memory loss. Patient is now s/p bifrontal craniotomy for meningioma resection (frozen: meningioma) 10/27/22    HEADACHE    Handoff    MEWS Score    Meningioma    Hypertension    H/O hyperthyroidism    Brain tumor    Brain tumor    Tension type headache    Meningioma    Hypertension    Preoperative testing    Preoperative examination    Prophylactic measure    Craniotomy for meningioma    Status post thyroid surgery    HEADACHE    Hypertension    [email or web address]        PLAN:  NEURO:  Neuro:  - neuro/vital checks q1  - pain control prn  - keppra for seizure prophylaxis  - decadron 4q6 x3d then taper over 1 week  - pending postop MRI brain  - subgaleal JUN x2, monitor output  - CT 10/27 - stable, significant edema    Cardiac:  - -140  - lisinopril held, home metoprolol 25 BID (tartrate inpatient, was on ER at home)    Pulmonary:  - NC prn    GI:  - NPO, ADAT  - bowel regimen  - PPI while on steroids    Renal:  - Na goal 140-150; s/p 3% bolus  - IVF while NPO  - Sebastian    Heme:  - SCDs for now for DVT ppx  - 10/26 LE dopplers neg    Endo:  - no issues  - ISS    ID:  - afebrile  - postop ancef while drains in place; plan to DC on Augmentin x 10days per neuroplastics    Disposition: ICU status, full code, dispo pending PT/OT  Family updated with plan    D/w Dr. Garnica and Dr. Sauceda     S/Overnight events: Pt seen and evaluated at bedside. Appeared lethargic, not following commands, CTH showed significant edema. Exam improved overnight and with family at bedside.       Hospital Course:   10/27: POD# 0 S/P bifrontal craniotomy for removal of skull base mass (frozen: meningioma.) Postop, Pt reports mild headache otherwise neurologically stable. Waxing/waning neuro exam. CTH stable, but with significant edema. Na 143, given 250cc 3% bolus.       Vital Signs Last 24 Hrs  T(C): 36 (27 Oct 2022 21:25), Max: 36.5 (27 Oct 2022 04:47)  T(F): 96.8 (27 Oct 2022 21:25), Max: 97.7 (27 Oct 2022 04:47)  HR: 82 (28 Oct 2022 00:00) (65 - 90)  BP: 131/63 (28 Oct 2022 00:00) (118/69 - 160/90)  BP(mean): 91 (28 Oct 2022 00:00) (89 - 118)  RR: 16 (28 Oct 2022 00:00) (12 - 19)  SpO2: 97% (28 Oct 2022 00:00) (93% - 100%)    Parameters below as of 28 Oct 2022 00:00  Patient On (Oxygen Delivery Method): room air        I&O's Detail    27 Oct 2022 07:01  -  28 Oct 2022 01:28  --------------------------------------------------------  IN:    IV PiggyBack: 450 mL    sodium chloride 0.9%: 787.5 mL    sodium chloride 3%: 150 mL  Total IN: 1387.5 mL    OUT:    Bulb (mL): 35 mL    Bulb (mL): 160 mL    Indwelling Catheter - Urethral (mL): 1095 mL  Total OUT: 1290 mL    Total NET: 97.5 mL        I&O's Summary    27 Oct 2022 07:01  -  28 Oct 2022 01:28  --------------------------------------------------------  IN: 1387.5 mL / OUT: 1290 mL / NET: 97.5 mL        PHYSICAL EXAM:  General: Lying on stretcher, NAD.  Skin: No rashes or lesions.  HEENT: PERRL, brisk. Face symmetrical. Head wrap in place, c/d/i.  Cardiac: regular rate and rhythm.   lungs: No respiratory distress, good chest rise.  Abd: soft, nontender, nondistended  Neurological: AOx2. Opens eyes, follows commands, BUE 3/5 effort limited, BLE wiggles toes, attempts to bend knees  Extremities: Warm, well perfused.     TUBES/LINES:  [x] A-line  [x] sebastian  [x] SG JUN x2    DIET:  [x] NPO  [] Mechanical  [] Tube feeds    LABS:                        9.9    7.66  )-----------( 150      ( 27 Oct 2022 13:34 )             30.8     10-27    143  |  110<H>  |  10  ----------------------------<  148<H>  4.4   |  23  |  0.70    Ca    9.0      27 Oct 2022 22:03  Phos  4.1     10-27  Mg     1.7     10-27    TPro  5.9<L>  /  Alb  3.4  /  TBili  <0.2  /  DBili  x   /  AST  16  /  ALT  7<L>  /  AlkPhos  65  10-27    PT/INR - ( 27 Oct 2022 13:34 )   PT: 16.4 sec;   INR: 1.37          PTT - ( 27 Oct 2022 13:34 )  PTT:27.6 sec        CAPILLARY BLOOD GLUCOSE      POCT Blood Glucose.: 139 mg/dL (27 Oct 2022 22:02)  POCT Blood Glucose.: 150 mg/dL (27 Oct 2022 16:43)      Drug Levels: [] N/A    CSF Analysis: [] N/A      Allergies    No Known Drug Allergies  strawberry (Hives)    Intolerances      MEDICATIONS:  Antibiotics:  ceFAZolin   IVPB 2000 milliGRAM(s) IV Intermittent every 8 hours    Neuro:  acetaminophen     Tablet .. 650 milliGRAM(s) Oral every 6 hours PRN  ketorolac   Injectable 15 milliGRAM(s) IV Push every 6 hours PRN  levETIRAcetam  IVPB 500 milliGRAM(s) IV Intermittent every 12 hours  melatonin 5 milliGRAM(s) Oral at bedtime PRN  oxyCODONE    IR 5 milliGRAM(s) Oral every 4 hours PRN  oxyCODONE    IR 10 milliGRAM(s) Oral every 4 hours PRN    Anticoagulation:    OTHER:  bisacodyl 5 milliGRAM(s) Oral daily PRN  dexAMETHasone  Injectable 4 milliGRAM(s) IV Push every 6 hours  dexAMETHasone  Injectable   IV Push   dextrose 50% Injectable 25 Gram(s) IV Push once  dextrose Oral Gel 15 Gram(s) Oral once PRN  glucagon  Injectable 1 milliGRAM(s) IntraMuscular once  hydrALAZINE Injectable 10 milliGRAM(s) IV Push every 3 hours PRN  insulin lispro (ADMELOG) corrective regimen sliding scale   SubCutaneous Before meals and at bedtime  lisinopril 10 milliGRAM(s) Oral daily  metoprolol tartrate 25 milliGRAM(s) Oral every 12 hours  pantoprazole    Tablet 40 milliGRAM(s) Oral before breakfast  senna 2 Tablet(s) Oral at bedtime    IVF:  dextrose 5%. 1000 milliLiter(s) IV Continuous <Continuous>  sodium chloride 0.9%. 1000 milliLiter(s) IV Continuous <Continuous>  sodium chloride 3%. 250 milliLiter(s) IV Continuous <Continuous>    CULTURES:    RADIOLOGY & ADDITIONAL TESTS:  < from: CT Head No Cont (10.27.22 @ 21:00) >  IMPRESSION:  Surgical changes for left frontal mass resection. No evidence of   postsurgical  complication.    < end of copied text >    ASSESSMENT:  77 year old female with pmh HTN found to have left frontal brain mass, likely meningioma, on workup for confusion and memory loss. Patient is now s/p bifrontal craniotomy for meningioma resection (frozen: meningioma) 10/27/22    HEADACHE    Handoff    MEWS Score    Meningioma    Hypertension    H/O hyperthyroidism    Brain tumor    Brain tumor    Tension type headache    Meningioma    Hypertension    Preoperative testing    Preoperative examination    Prophylactic measure    Craniotomy for meningioma    Status post thyroid surgery    HEADACHE    Hypertension    SysAdmin_VisitLink        PLAN:  NEURO:  Neuro:  - neuro/vital checks q1  - pain control prn  - keppra for seizure prophylaxis  - decadron 4q6 x3d then taper over 1 week  - pending postop MRI brain  - subgaleal JUN x2, monitor output  - CT 10/27 - stable, significant edema    Cardiac:  - -140  - lisinopril held, home metoprolol 25 BID (tartrate inpatient, was on ER at home)    Pulmonary:  - NC prn    GI:  - NPO, ADAT  - bowel regimen  - PPI while on steroids    Renal:  - Na goal 140-150; s/p 3% bolus  - IVF while NPO  - Sebastian    Heme:  - SCDs for now for DVT ppx  - 10/26 LE dopplers neg    Endo:  - no issues  - ISS    ID:  - afebrile  - postop ancef while drains in place; plan to DC on Augmentin x 10days per neuroplastics    Disposition: ICU status, full code, dispo pending PT/OT  Family updated with plan    D/w Dr. Garnica and Dr. Sauceda

## 2022-10-28 NOTE — PHYSICAL THERAPY INITIAL EVALUATION ADULT - MODALITIES TREATMENT COMMENTS
pt able to track, however, when performing quadrants, pt perseverated on 5 for every trial. CN's intact, cheek puff not tested 2/2 skull precautions

## 2022-10-28 NOTE — PROGRESS NOTE ADULT - NSPROGADDITIONALINFOA_GEN_ALL_CORE
I have personally reviewed the above clinical note and all of its components and:  [ ] agree with the above assessment and plan without modifications.  [ x] agree with the above with modifications made to the assessment and/or plan of care.  [ ] disagree with the above with significant modifications as listed below:

## 2022-10-28 NOTE — PROGRESS NOTE ADULT - SUBJECTIVE AND OBJECTIVE BOX
=================================  NEUROCRITICAL CARE ATTENDING NOTE  =================================    VICKIE JARRETT   MRN-[id number]  Summary:  77y/F with pmh HTN who lives in [place] presents with left frontal mass, likely meningioma. Per son, meningioma was diagnosed upon CT head performed for memory loss and confusion. Son reports that confusion has been ongoing for several months. Patient admits to mild headache but denies diplopia, blurry vision, nausea, vomiting, extremity numbness or weakness and slurred speech. Patient is preop for bifrontal craniotomy for meningioma resection 10/27.  (26 Oct 2022 10:58)    COURSE IN THE HOSPITAL:  10/26 admitted to [facility]  10/27 s/p OR  10/28 overnight -- nonverbal, not FC intermittently, given 250cc 3% bolus;    Past Medical History: Meningioma Hypertension H/O hyperthyroidism   Allergies:  No Known Drug Allergies strawberry (Hives)  Home meds:   ·	aspirin 81 mg oral tablet, chewable: 0.5 tab(s) orally once a day, As Needed  ·	(last taken over 1 week ago)  ·	lisinopril 10 mg oral tablet: 1 tab(s) orally once a day  ·	metoprolol succinate 25 mg oral tablet, extended release: 1 tab(s) orally 2 times a day    PHYSICAL EXAMINATION  T(C): 36.9 (10-28 @ 05:45), Max: 36.9 (10-28 @ 05:45) HR: 79 (10-28 @ 06:00) (65 - 95) BP: 124/57 (10-28 @ 06:00) (118/69 - 160/90) RR: 14 (10-28 @ 06:00) (12 - 19) SpO2: 95% (10-28 @ 06:00) (93% - 100%)  NEUROLOGIC EXAMINATION:  Patient is  AOx1, confused, TESSIE, perseverates, moves all 4s  GENERAL: not intubated, not in cardiorespiratory distress  EENT:  anicteric  CARDIOVASCULAR: (+) S1 S2, normal rate and regular rhythm  PULMONARY: clear to auscultation bilaterally  ABDOMEN: soft, nontender with normoactive bowel sounds  EXTREMITIES: no edema  SKIN: no rash    LABS: 10-28  CAPILLARY BLOOD GLUCOSE 121 139 150     (7.66)  9.8  (9.9)  15.68 )-----------( 164      ( 28 Oct 2022 04:58 )             30.2     147<H>  |  115<H>  |  11  ----------------------------<  130<H>  4.1   |  23  |  0.75    Ca    8.8      28 Oct 2022 04:58  Phos  4.1     10-27  Mg     1.7     10-27    TPro  5.9<L>  /  Alb  3.4  /  TBili  <0.2  /  DBili  x   /  AST  16  /  ALT  7<L>  /  AlkPhos  65  10-27    10-27 @ 07:01  -  10-28 @ 06:55  IN: 2112.5 mL / OUT: 1645 mL / NET: 467.5 mL    Bacteriology:  CSF studies:  EEG:  Neuroimaging:  Other imaging:    MEDICATIONS: 10-28    ·	ceFAZolin   IVPB 2000 IV Intermittent every 8 hours  ·	levETIRAcetam  IVPB 500 IV Intermittent every 12 hours  ·	lisinopril 10 milliGRAM(s) Oral daily  ·	metoprolol tartrate 25 milliGRAM(s) Oral every 12 hours  ·	pantoprazole    Tablet 40 Oral before breakfast  ·	senna 2 Oral at bedtime  ·	dexAMETHasone  Injectable 4 IV Push every 6 hours  ·	insulin lispro (ADMELOG) corrective regimen sliding scale  SubCutaneous Before meals and at bedtime  ·	acetaminophen     Tablet .. 650 Oral every 6 hours PRN  ·	bisacodyl 5 Oral daily PRN  ·	hydrALAZINE Injectable 10 IV Push every 3 hours PRN  ·	ketorolac   Injectable 15 IV Push every 6 hours PRN  ·	melatonin 5 Oral at bedtime PRN  ·	oxyCODONE    IR 5 Oral every 4 hours PRN  ·	oxyCODONE    IR 10 Oral every 4 hours PRN    IV FLUIDS:  DRIPS:  DIET:  Lines:  Drains:    SGx2  Wounds:    CODE STATUS:  Full Code                       GOALS OF CARE:  aggressive                      DISPOSITION:  ICU =================================  NEUROCRITICAL CARE ATTENDING NOTE  =================================    VICKIE JARRETT   MRN-[id number]  Summary:  77y/F with pmh HTN who lives in [place] presents with left frontal mass, likely meningioma. Per son, meningioma was diagnosed upon CT head performed for memory loss and confusion. Son reports that confusion has been ongoing for several months. Patient admits to mild headache but denies diplopia, blurry vision, nausea, vomiting, extremity numbness or weakness and slurred speech. Patient is preop for bifrontal craniotomy for meningioma resection 10/27.  (26 Oct 2022 10:58)    COURSE IN THE HOSPITAL:  10/26 admitted to [facility]  10/27 s/p OR, slow to wake-up, post-op CT no significant findings  10/28 overnight -- nonverbal, not FC intermittently, given 250cc 3% bolus;    Past Medical History: Meningioma Hypertension H/O hyperthyroidism   Allergies:  No Known Drug Allergies strawberry (Hives)  Home meds:   ·	aspirin 81 mg oral tablet, chewable: 0.5 tab(s) orally once a day, As Needed  ·	(last taken over 1 week ago)  ·	lisinopril 10 mg oral tablet: 1 tab(s) orally once a day  ·	metoprolol succinate 25 mg oral tablet, extended release: 1 tab(s) orally 2 times a day    PHYSICAL EXAMINATION  T(C): 36.9 (10-28 @ 05:45), Max: 36.9 (10-28 @ 05:45) HR: 79 (10-28 @ 06:00) (65 - 95) BP: 124/57 (10-28 @ 06:00) (118/69 - 160/90) RR: 14 (10-28 @ 06:00) (12 - 19) SpO2: 95% (10-28 @ 06:00) (93% - 100%)  NEUROLOGIC EXAMINATION:  Patient is  AOx1, confused, TESSIE, perseverates, moves all 4s; c/o BOV   GENERAL: not intubated, not in cardiorespiratory distress  EENT:  anicteric  CARDIOVASCULAR: (+) S1 S2, normal rate and regular rhythm  PULMONARY: clear to auscultation bilaterally  ABDOMEN: soft, nontender with normoactive bowel sounds  EXTREMITIES: no edema  SKIN: no rash    LABS: 10-28  CAPILLARY BLOOD GLUCOSE 121 139 150     (7.66)  9.8  (9.9)  15.68 )-----------( 164      ( 28 Oct 2022 04:58 )             30.2     147<H>  |  115<H>  |  11  ----------------------------<  130<H>  4.1   |  23  |  0.75    Ca    8.8      28 Oct 2022 04:58  Phos  4.1     10-27  Mg     1.7     10-27    TPro  5.9<L>  /  Alb  3.4  /  TBili  <0.2  /  DBili  x   /  AST  16  /  ALT  7<L>  /  AlkPhos  65  10-27    A1C 5.1    10-27 @ 07:01  -  10-28 @ 06:55  IN: 2112.5 mL / OUT: 1645 mL / NET: 467.5 mL    Bacteriology:  CSF studies:  EEG:  Neuroimaging:  Other imaging:    MEDICATIONS: 10-28    ·	ceFAZolin   IVPB 2000 IV Intermittent every 8 hours  ·	levETIRAcetam  IVPB 500 IV Intermittent every 12 hours  ·	lisinopril 10 milliGRAM(s) Oral daily  ·	metoprolol tartrate 25 milliGRAM(s) Oral every 12 hours  ·	pantoprazole    Tablet 40 Oral before breakfast  ·	senna 2 Oral at bedtime  ·	dexAMETHasone  Injectable 4 IV Push every 6 hours  ·	insulin lispro (ADMELOG) corrective regimen sliding scale  SubCutaneous Before meals and at bedtime  ·	acetaminophen     Tablet .. 650 Oral every 6 hours PRN  ·	bisacodyl 5 Oral daily PRN  ·	hydrALAZINE Injectable 10 IV Push every 3 hours PRN  ·	ketorolac   Injectable 15 IV Push every 6 hours PRN  ·	melatonin 5 Oral at bedtime PRN  ·	oxyCODONE    IR 5 Oral every 4 hours PRN  ·	oxyCODONE    IR 10 Oral every 4 hours PRN    IV FLUIDS: NS@50cc/hr  DRIPS:  DIET: full liquid  Lines: [place]   Drains:    ·	SGx2   Wounds:    CODE STATUS:  Full Code                       GOALS OF CARE:  aggressive                      DISPOSITION:  ICU

## 2022-10-28 NOTE — PROGRESS NOTE ADULT - ASSESSMENT
77y/F with  L parafalcine mass, meningioma, brain compression, cerebral edema, s/p bifrontal crani, resection (10/27/2022, Dr. Garnica)  Hypertension  h/o hyperthyroidism    PLAN:   NEURO: neurochecks q1h, PRN pain meds with acetaminophen  continue steroids, taper as per neurosurgery, f/u final histopathology, MRI on stepdown  seizure prophylaxis:  levetiracetam 500mg PO BID, as per neurosurgery   SGx2  REHAB:  physical therapy evaluation and management    EARLY MOB:  bedrest    PULM:  PRN O2 support to keep sats >/=92%, skull base precautions: no nose blowing, drinking with a straw, or bending below waist; NO incentive spirometry   CARDIO:  SBP goal 100-140mm Hg, continue lisinopril, metoprolol  ENDO:  Blood sugar goals 140-180 mg/dL, continue insulin sliding scale  GI:  PPI for GI prophylaxis while on steroids  DIET: advance as tolerated  RENAL:  IVF until eating well  HEM/ONC: Hb stable  VTE Prophylaxis: SCDs, no DVT chemoprophylaxis for now as patient is high risk for bleed (fresh post-op); baseline dopp NEG  ID: afebrile, no leukocytosis, periop ancef - duration as per neurosurgery  Social: will update family    Active issues:  What's keeping patient in the ICU? fresh post-op  What is this patient's dispo plan? stepdown when stable    ATTENDING ATTESTATION:  I was physically present for the key portions of the evaluation and management (E/M) service provided.  I agree with the above history, physical and plan, which I have reviewed and edited where appropriate.    Patient at high risk for neurological deterioration or death due to:  ICU delirium, aspiration PNA, DVT / PE.  Critical care time:  I have personally provided 45 minutes of critical care time, excluding time spent on separate procedures.      Plan discussed with RN, house staff. 77y/F with  L parafalcine mass, meningioma, brain compression, cerebral edema, s/p bifrontal crani, resection (10/27/2022, Dr. Garnica)  Hypertension  h/o hyperthyroidism    PLAN:   NEURO: neurochecks q1h, VSq1h, PRN pain meds with acetaminophen, ketorolac, d/c opiates  continue steroids, taper as per neurosurgery, f/u final histopathology, MRI on stepdown  seizure prophylaxis:  levetiracetam 500mg PO BID, as per neurosurgery   keep SGx2  REHAB:  physical therapy evaluation and management    EARLY MOB:  OOB to chair     PULM:  room air, skull base precautions: no nose blowing, drinking with a straw, or bending below waist; NO incentive spirometry   CARDIO:  SBP goal 100-140mm Hg, continue lisinopril, metoprolol  ENDO:  Blood sugar goals 140-180 mg/dL, continue insulin sliding scale  GI:  PPI for GI prophylaxis while on steroids  DIET: full liquid - advance   RENAL:  IVF for now  HEM/ONC: Hb stable anemia  VTE Prophylaxis: SCDs, SQL tonight; baseline dopp NEG  ID: afebrile, leukocytosis, periop ancef - duration as per neurosurgery  Social: son updated this morning    Active issues:  What's keeping patient in the ICU? waxing and waning neuro exam  What is this patient's dispo plan? stepdown when stable    ATTENDING ATTESTATION:  I was physically present for the key portions of the evaluation and management (E/M) service provided.  I agree with the above history, physical and plan, which I have reviewed and edited where appropriate.    Patient at high risk for neurological deterioration or death due to:  ICU delirium, aspiration PNA, DVT / PE.  Critical care time:  I have personally provided 45 minutes of critical care time, excluding time spent on separate procedures.      Plan discussed with RN, house staff.

## 2022-10-28 NOTE — PHYSICAL THERAPY INITIAL EVALUATION ADULT - ADDITIONAL COMMENTS
Pt confused and A&O to name only. Pt unable to state , where she lives, the date, or situation. Pt's daughter at bedside, confirmed pt is staying with her while in [facility], but lives in a house in [place] alone.

## 2022-10-28 NOTE — OCCUPATIONAL THERAPY INITIAL EVALUATION ADULT - PATIENT/FAMILY/SIGNIFICANT OTHER GOALS STATEMENT, OT EVAL
Pt confused throughout and unable to appropriately state goals. Pt's daughter present and stated that her goals are for patient to return to PLOF.

## 2022-10-28 NOTE — DIETITIAN INITIAL EVALUATION ADULT - PERTINENT MEDS FT
MEDICATIONS  (STANDING):  ceFAZolin   IVPB 2000 milliGRAM(s) IV Intermittent every 8 hours  chlorhexidine 2% Cloths 1 Application(s) Topical <User Schedule>  dexAMETHasone  Injectable 4 milliGRAM(s) IV Push every 6 hours  dexAMETHasone  Injectable   IV Push   dextrose 5%. 1000 milliLiter(s) (50 mL/Hr) IV Continuous <Continuous>  dextrose 50% Injectable 25 Gram(s) IV Push once  enoxaparin Injectable 40 milliGRAM(s) SubCutaneous every 24 hours  glucagon  Injectable 1 milliGRAM(s) IntraMuscular once  insulin lispro (ADMELOG) corrective regimen sliding scale   SubCutaneous Before meals and at bedtime  levETIRAcetam  IVPB 500 milliGRAM(s) IV Intermittent every 12 hours  lisinopril 10 milliGRAM(s) Oral daily  metoprolol tartrate 25 milliGRAM(s) Oral every 12 hours  pantoprazole    Tablet 40 milliGRAM(s) Oral before breakfast  senna 2 Tablet(s) Oral at bedtime  sodium chloride 0.9%. 1000 milliLiter(s) (75 mL/Hr) IV Continuous <Continuous>  sodium chloride 0.9%. 1000 milliLiter(s) (50 mL/Hr) IV Continuous <Continuous>    MEDICATIONS  (PRN):  acetaminophen     Tablet .. 650 milliGRAM(s) Oral every 6 hours PRN Mild Pain (1 - 3)  bisacodyl 5 milliGRAM(s) Oral daily PRN Constipation  dextrose Oral Gel 15 Gram(s) Oral once PRN Blood Glucose LESS THAN 70 milliGRAM(s)/deciliter  hydrALAZINE Injectable 10 milliGRAM(s) IV Push every 3 hours PRN SBP>140  ketorolac   Injectable 15 milliGRAM(s) IV Push every 6 hours PRN breakthrough pain

## 2022-10-28 NOTE — OCCUPATIONAL THERAPY INITIAL EVALUATION ADULT - PLANNED THERAPY INTERVENTIONS, OT EVAL
ADL retraining/balance training/bed mobility training/cognitive, visual perceptual/fine motor coordination training/motor coordination training/neuromuscular re-education/strengthening/stretching/transfer training

## 2022-10-28 NOTE — OCCUPATIONAL THERAPY INITIAL EVALUATION ADULT - ADDITIONAL COMMENTS
Pt poor historian 2/2 impaired cognition, and inability to follow commands. Pt's daughter at bedside reporting pt lives in [place] in private home alone. Pt's daughter stating that pt will live with her in her [facility] apartment prior to returning to [place].

## 2022-10-28 NOTE — DIETITIAN INITIAL EVALUATION ADULT - DIET TYPE
Monitor need for SLP evaluation prior to PO intake to assess for safest, least restrictive texture modification if pt fails RN bedside swallow eval for safe diet advancement./regular

## 2022-10-28 NOTE — CHART NOTE - NSCHARTNOTEFT_GEN_A_CORE
CT stable post-op, exam neurointact w/ episode of lethargy overnight held oxycodone, pain adequately controlled. He removed, failed TOV, straight cath prn. Subgaleal JUN x2 in place- monitoring outputs.

## 2022-10-28 NOTE — PROGRESS NOTE ADULT - SUBJECTIVE AND OBJECTIVE BOX
NSCU ATTENDING -- ADDITIONAL PROGRESS NOTE    Nighttime rounds were performed -- please refer to earlier Progress Note for HPI details.      ICU Vital Signs Last 24 Hrs  T(C): 37.5 (28 Oct 2022 21:25), Max: 37.5 (28 Oct 2022 21:25)  T(F): 99.5 (28 Oct 2022 21:25), Max: 99.5 (28 Oct 2022 21:25)  HR: 84 (28 Oct 2022 22:00) (64 - 98)  BP: 140/67 (28 Oct 2022 21:00) (119/58 - 152/72)  BP(mean): 96 (28 Oct 2022 21:00) (82 - 107)  ABP: 126/58 (28 Oct 2022 22:00) (93/81 - 152/61)  ABP(mean): 85 (28 Oct 2022 22:00) (70 - 99)  RR: 18 (28 Oct 2022 22:00) (14 - 22)  SpO2: 96% (28 Oct 2022 22:00) (95% - 98%)      10-27-22 @ 07:01  -  10-28-22 @ 07:00  --------------------------------------------------------  IN: 2362.5 mL / OUT: 1645 mL / NET: 717.5 mL    10-28-22 @ 07:01  -  10-28-22 @ 22:45  --------------------------------------------------------  IN: 1105 mL / OUT: 325 mL / NET: 780 mL      PHYSICAL EXAMINATION  NEUROLOGIC EXAMINATION:  Patient is Ox1 person only, confused , pupils 3mm and reactive, moves all 4 extremities AG 5/5    MEDICATIONS:   acetaminophen     Tablet .. 650 milliGRAM(s) Oral every 6 hours PRN  bisacodyl 5 milliGRAM(s) Oral daily PRN  ceFAZolin   IVPB 2000 milliGRAM(s) IV Intermittent every 8 hours  chlorhexidine 2% Cloths 1 Application(s) Topical <User Schedule>  dexAMETHasone  Injectable   IV Push   dexAMETHasone  Injectable 4 milliGRAM(s) IV Push every 6 hours  dextrose 5%. 1000 milliLiter(s) (50 mL/Hr) IV Continuous <Continuous>  dextrose 50% Injectable 25 Gram(s) IV Push once  dextrose Oral Gel 15 Gram(s) Oral once PRN  enoxaparin Injectable 40 milliGRAM(s) SubCutaneous every 24 hours  glucagon  Injectable 1 milliGRAM(s) IntraMuscular once  hydrALAZINE Injectable 10 milliGRAM(s) IV Push every 3 hours PRN  insulin lispro (ADMELOG) corrective regimen sliding scale   SubCutaneous Before meals and at bedtime  ketorolac   Injectable 15 milliGRAM(s) IV Push every 6 hours PRN  levETIRAcetam  IVPB 500 milliGRAM(s) IV Intermittent every 12 hours  lisinopril 10 milliGRAM(s) Oral daily  metoprolol tartrate 25 milliGRAM(s) Oral every 12 hours  pantoprazole    Tablet 40 milliGRAM(s) Oral before breakfast  senna 2 Tablet(s) Oral at bedtime  sodium chloride 0.9%. 1000 milliLiter(s) (50 mL/Hr) IV Continuous <Continuous>    LABS:                      9.8    15.68 )-----------( 164      ( 28 Oct 2022 04:58 )             30.2     10-28    147<H>  |  115<H>  |  11  ----------------------------<  130<H>  4.1   |  23  |  0.75    Ca    8.8      28 Oct 2022 04:58  Phos  4.1     10-27  Mg     1.7     10-27    TPro  5.9<L>  /  Alb  3.4  /  TBili  <0.2  /  DBili  x   /  AST  16  /  ALT  7<L>  /  AlkPhos  65  10-27    LIVER FUNCTIONS - ( 27 Oct 2022 13:34 )  Alb: 3.4 g/dL / Pro: 5.9 g/dL / ALK PHOS: 65 U/L / ALT: 7 U/L / AST: 16 U/L / GGT: x           ABG - ( 27 Oct 2022 09:23 )  pH, Arterial: 7.37  pH, Blood: x     /  pCO2: 36    /  pO2: 216   / HCO3: 21    / Base Excess: -4.0  /  SaO2: x           78 y/o F with:  L parafalcine mass, meningioma, brain compression, cerebral edema, s/p bifrontal crani, resection (10/27/2022, Dr. Garnica) POD 1  Hypertension  History of hyperthyroidism    PLAN:   Neurochecks Q4h, PRN analgesia  CT post op- stable vasogenic edema with shift  Continue steroids, taper as per neurosurgery, f/u final histopathology, MRI on stepdown  Seizure prophylaxis:  levetiracetam 500mg PO BID, as per neurosurgery.   PRN O2 support to keep sats >/=92%  SBP goal 100-140mm Hg, continue lisinopril, metoprolol  Blood sugar goals 140-180 mg/dL, continue insulin sliding scale  PPI for GI prophylaxis while on steroids; advance as tolerated  IVL once tolerating well. Na goal 140- 150. Not on hypertonics  Hb stable  VTE Prophylaxis: SCDs, chemoppx 1-/28  Baeline dopplers negative  ID: Afebrile, leukocytosis- reactive and steroid related, periop ancef - duration as per neurosurgery-> DC on augmentin    Patient remains critically ill.    Additional 30 minutes of critical care time.       NSCU ATTENDING -- ADDITIONAL PROGRESS NOTE    Nighttime rounds were performed -- please refer to earlier Progress Note for HPI details.      ICU Vital Signs Last 24 Hrs  T(C): 37.5 (28 Oct 2022 21:25), Max: 37.5 (28 Oct 2022 21:25)  T(F): 99.5 (28 Oct 2022 21:25), Max: 99.5 (28 Oct 2022 21:25)  HR: 84 (28 Oct 2022 22:00) (64 - 98)  BP: 140/67 (28 Oct 2022 21:00) (119/58 - 152/72)  BP(mean): 96 (28 Oct 2022 21:00) (82 - 107)  ABP: 126/58 (28 Oct 2022 22:00) (93/81 - 152/61)  ABP(mean): 85 (28 Oct 2022 22:00) (70 - 99)  RR: 18 (28 Oct 2022 22:00) (14 - 22)  SpO2: 96% (28 Oct 2022 22:00) (95% - 98%)      10-27-22 @ 07:01  -  10-28-22 @ 07:00  --------------------------------------------------------  IN: 2362.5 mL / OUT: 1645 mL / NET: 717.5 mL    10-28-22 @ 07:01  -  10-28-22 @ 22:45  --------------------------------------------------------  IN: 1105 mL / OUT: 325 mL / NET: 780 mL      PHYSICAL EXAMINATION  NEUROLOGIC EXAMINATION:  Patient is Ox1 person only, confused , pupils 3mm and reactive, moves all 4 extremities AG 5/5    MEDICATIONS:   acetaminophen     Tablet .. 650 milliGRAM(s) Oral every 6 hours PRN  bisacodyl 5 milliGRAM(s) Oral daily PRN  ceFAZolin   IVPB 2000 milliGRAM(s) IV Intermittent every 8 hours  chlorhexidine 2% Cloths 1 Application(s) Topical <User Schedule>  dexAMETHasone  Injectable   IV Push   dexAMETHasone  Injectable 4 milliGRAM(s) IV Push every 6 hours  dextrose 5%. 1000 milliLiter(s) (50 mL/Hr) IV Continuous <Continuous>  dextrose 50% Injectable 25 Gram(s) IV Push once  dextrose Oral Gel 15 Gram(s) Oral once PRN  enoxaparin Injectable 40 milliGRAM(s) SubCutaneous every 24 hours  glucagon  Injectable 1 milliGRAM(s) IntraMuscular once  hydrALAZINE Injectable 10 milliGRAM(s) IV Push every 3 hours PRN  insulin lispro (ADMELOG) corrective regimen sliding scale   SubCutaneous Before meals and at bedtime  ketorolac   Injectable 15 milliGRAM(s) IV Push every 6 hours PRN  levETIRAcetam  IVPB 500 milliGRAM(s) IV Intermittent every 12 hours  lisinopril 10 milliGRAM(s) Oral daily  metoprolol tartrate 25 milliGRAM(s) Oral every 12 hours  pantoprazole    Tablet 40 milliGRAM(s) Oral before breakfast  senna 2 Tablet(s) Oral at bedtime  sodium chloride 0.9%. 1000 milliLiter(s) (50 mL/Hr) IV Continuous <Continuous>    LABS:                      9.8    15.68 )-----------( 164      ( 28 Oct 2022 04:58 )             30.2     10-28    147<H>  |  115<H>  |  11  ----------------------------<  130<H>  4.1   |  23  |  0.75    Ca    8.8      28 Oct 2022 04:58  Phos  4.1     10-27  Mg     1.7     10-27    TPro  5.9<L>  /  Alb  3.4  /  TBili  <0.2  /  DBili  x   /  AST  16  /  ALT  7<L>  /  AlkPhos  65  10-27    LIVER FUNCTIONS - ( 27 Oct 2022 13:34 )  Alb: 3.4 g/dL / Pro: 5.9 g/dL / ALK PHOS: 65 U/L / ALT: 7 U/L / AST: 16 U/L / GGT: x           ABG - ( 27 Oct 2022 09:23 )  pH, Arterial: 7.37  pH, Blood: x     /  pCO2: 36    /  pO2: 216   / HCO3: 21    / Base Excess: -4.0  /  SaO2: x           78 y/o F with:  L parafalcine mass, meningioma, brain compression, cerebral edema, s/p bifrontal crani, resection (10/27/2022, Dr. Garnica) POD 1  Hypertension  History of hyperthyroidism    PLAN:   Neurochecks Q4h, PRN analgesia  CT post op- stable vasogenic edema with shift  Continue steroids, taper as per neurosurgery, f/u final histopathology, MRI on stepdown  Seizure prophylaxis:  levetiracetam 500mg PO BID, as per neurosurgery.   PRN O2 support to keep sats >/=92%  SBP goal 100-140mm Hg, continue lisinopril, metoprolol  Blood sugar goals 140-180 mg/dL, continue insulin sliding scale  PPI for GI prophylaxis while on steroids; advance as tolerated  IVL once tolerating well. Na goal 140- 150. Not on hypertonics  Hb stable  VTE Prophylaxis: SCDs, chemoppx 1-/28  Baeline dopplers negative  ID: Afebrile, leukocytosis- reactive and steroid related, periop ancef - duration as per neurosurgery-> DC on augmentin    No longer critically ill    ICU stepdown Checklist:    Completed: 10-29 @ 02:55    [X] hemodynamically stable – VS WNL and stable x 24hours, UO adequate  [n/a ] if  previously on HDA - off pressors x 24h with stable neuro exam    [X] no new symptoms x 24h (i.e. new fever, new-onset nausea/vomiting)  [X] stable labs: (i.e. WBC not rising, sodium not dropping)  [X] patient not at high risk for aspiration, if high risk then:                  [ ] should have definitive plans for trach/PEG (alternative option is to discharge from ICU to facilty)                  [ ] stepdown to bed close to nurse’s station  [n/a] low suctioning requirements (i.e. q4h or less)  [X] sign-off from primary RN*  [X] drains do not require ICU level of care  [X] if patient previously agitated or with behavioral issues – controlled   [X] pain controlled

## 2022-10-28 NOTE — OCCUPATIONAL THERAPY INITIAL EVALUATION ADULT - DIAGNOSIS, OT EVAL
Pt presenting with impaired cognition, decreased postural control, impaired balance, decreased safety awareness, and poor functional activity tolerance, impacting ability to perform functional mobility/ADLs.

## 2022-10-28 NOTE — DIETITIAN INITIAL EVALUATION ADULT - PERTINENT LABORATORY DATA
10-28    147<H>  |  115<H>  |  11  ----------------------------<  130<H>  4.1   |  23  |  0.75    Ca    8.8      28 Oct 2022 04:58  Phos  4.1     10-27  Mg     1.7     10-27    TPro  5.9<L>  /  Alb  3.4  /  TBili  <0.2  /  DBili  x   /  AST  16  /  ALT  7<L>  /  AlkPhos  65  10-27  POCT Blood Glucose.: 105 mg/dL (10-28-22 @ 12:07)  A1C with Estimated Average Glucose Result: 5.1 % (10-28-22 @ 04:58)

## 2022-10-28 NOTE — PHYSICAL THERAPY INITIAL EVALUATION ADULT - PERTINENT HX OF CURRENT PROBLEM, REHAB EVAL
77 year old female with pmh HTN who lives in [place] presents with left frontal mass, likely meningioma. Per son, meningioma was diagnosed upon CT head performed for memory loss and confusion. Son reports that confusion has been ongoing for several months. Patient admits to mild headache but denies diplopia, blurry vision, nausea, vomiting, extremity numbness or weakness and slurred speech. Patient is preop for bifrontal craniotomy for meningioma resection 10/27.

## 2022-10-28 NOTE — DIETITIAN INITIAL EVALUATION ADULT - OTHER CALCULATIONS
IBW used for calculations as pt >120% of IBW (121%). Needs adjusted for wound healing post op, and advanced age.

## 2022-10-28 NOTE — OCCUPATIONAL THERAPY INITIAL EVALUATION ADULT - MODALITIES TREATMENT COMMENTS
Cranial Nerves II - XII: II: PERRLA; visual fields unable to formally assess as pt perseverating on the word/number "five" when testing each field. III, IV, VI: EOMI, no nystagmus appreciated V: facial sensation intact to light touch V1-V3 b/l VII: no ptosis, no facial droop, symmetric eyebrow raise and closure VIII: hearing intact to finger rub b/l  XI: head turning and shoulder shrug intact b/l XII: tongue protrusion midline.// pt able to ambulate ~5ft from bed to chair CGAx1 hand held assist, demo unsteadiness and poor safety awareness throughout, requiring Max verbal/tactile/visual cues for safety and motor planning/sequencing.

## 2022-10-28 NOTE — OCCUPATIONAL THERAPY INITIAL EVALUATION ADULT - GENERAL OBSERVATIONS, REHAB EVAL
OT IE completed. Pt admitted to [facility] for tumor resection. Orders received, chart reviewed, pt cleared for OT by DAVID Condon. Pt received semi supine in bed, NAD, +heplock, +crani dressing C/D/I, +a-line. Pt A&Ox1, agreeable to OT, and tolerated session fairly well.

## 2022-10-29 LAB
ANION GAP SERPL CALC-SCNC: 9 MMOL/L — SIGNIFICANT CHANGE UP (ref 5–17)
BUN SERPL-MCNC: 15 MG/DL — SIGNIFICANT CHANGE UP (ref 7–23)
CALCIUM SERPL-MCNC: 8.8 MG/DL — SIGNIFICANT CHANGE UP (ref 8.4–10.5)
CHLORIDE SERPL-SCNC: 114 MMOL/L — HIGH (ref 96–108)
CO2 SERPL-SCNC: 22 MMOL/L — SIGNIFICANT CHANGE UP (ref 22–31)
CREAT SERPL-MCNC: 0.75 MG/DL — SIGNIFICANT CHANGE UP (ref 0.5–1.3)
EGFR: 83 ML/MIN/1.73M2 — SIGNIFICANT CHANGE UP
GLUCOSE BLDC GLUCOMTR-MCNC: 109 MG/DL — HIGH (ref 70–99)
GLUCOSE SERPL-MCNC: 109 MG/DL — HIGH (ref 70–99)
HCT VFR BLD CALC: 30.7 % — LOW (ref 34.5–45)
HGB BLD-MCNC: 9.9 G/DL — LOW (ref 11.5–15.5)
MAGNESIUM SERPL-MCNC: 1.9 MG/DL — SIGNIFICANT CHANGE UP (ref 1.6–2.6)
MCHC RBC-ENTMCNC: 22.3 PG — LOW (ref 27–34)
MCHC RBC-ENTMCNC: 32.2 GM/DL — SIGNIFICANT CHANGE UP (ref 32–36)
MCV RBC AUTO: 69.3 FL — LOW (ref 80–100)
NRBC # BLD: 0 /100 WBCS — SIGNIFICANT CHANGE UP (ref 0–0)
PHOSPHATE SERPL-MCNC: 3.3 MG/DL — SIGNIFICANT CHANGE UP (ref 2.5–4.5)
PLATELET # BLD AUTO: 171 K/UL — SIGNIFICANT CHANGE UP (ref 150–400)
POTASSIUM SERPL-MCNC: 4.2 MMOL/L — SIGNIFICANT CHANGE UP (ref 3.5–5.3)
POTASSIUM SERPL-SCNC: 4.2 MMOL/L — SIGNIFICANT CHANGE UP (ref 3.5–5.3)
RBC # BLD: 4.43 M/UL — SIGNIFICANT CHANGE UP (ref 3.8–5.2)
RBC # FLD: 15.6 % — HIGH (ref 10.3–14.5)
SODIUM SERPL-SCNC: 145 MMOL/L — SIGNIFICANT CHANGE UP (ref 135–145)
WBC # BLD: 13.42 K/UL — HIGH (ref 3.8–10.5)
WBC # FLD AUTO: 13.42 K/UL — HIGH (ref 3.8–10.5)

## 2022-10-29 PROCEDURE — 99233 SBSQ HOSP IP/OBS HIGH 50: CPT

## 2022-10-29 RX ORDER — QUETIAPINE FUMARATE 200 MG/1
12.5 TABLET, FILM COATED ORAL ONCE
Refills: 0 | Status: COMPLETED | OUTPATIENT
Start: 2022-10-29 | End: 2022-10-29

## 2022-10-29 RX ORDER — LEVETIRACETAM 250 MG/1
500 TABLET, FILM COATED ORAL
Refills: 0 | Status: DISCONTINUED | OUTPATIENT
Start: 2022-10-29 | End: 2022-11-01

## 2022-10-29 RX ORDER — MAGNESIUM OXIDE 400 MG ORAL TABLET 241.3 MG
400 TABLET ORAL ONCE
Refills: 0 | Status: COMPLETED | OUTPATIENT
Start: 2022-10-29 | End: 2022-10-29

## 2022-10-29 RX ORDER — HYDRALAZINE HCL 50 MG
10 TABLET ORAL
Refills: 0 | Status: DISCONTINUED | OUTPATIENT
Start: 2022-10-29 | End: 2022-11-13

## 2022-10-29 RX ORDER — DEXAMETHASONE 0.5 MG/5ML
ELIXIR ORAL
Refills: 0 | Status: COMPLETED | OUTPATIENT
Start: 2022-10-29 | End: 2022-11-06

## 2022-10-29 RX ORDER — DEXAMETHASONE 0.5 MG/5ML
4 ELIXIR ORAL EVERY 8 HOURS
Refills: 0 | Status: COMPLETED | OUTPATIENT
Start: 2022-10-30 | End: 2022-11-01

## 2022-10-29 RX ORDER — DEXAMETHASONE 0.5 MG/5ML
4 ELIXIR ORAL EVERY 6 HOURS
Refills: 0 | Status: COMPLETED | OUTPATIENT
Start: 2022-10-29 | End: 2022-10-30

## 2022-10-29 RX ORDER — DEXAMETHASONE 0.5 MG/5ML
2 ELIXIR ORAL EVERY 8 HOURS
Refills: 0 | Status: COMPLETED | OUTPATIENT
Start: 2022-11-01 | End: 2022-11-03

## 2022-10-29 RX ORDER — QUETIAPINE FUMARATE 200 MG/1
12.5 TABLET, FILM COATED ORAL AT BEDTIME
Refills: 0 | Status: DISCONTINUED | OUTPATIENT
Start: 2022-10-29 | End: 2022-10-31

## 2022-10-29 RX ORDER — DEXAMETHASONE 0.5 MG/5ML
2 ELIXIR ORAL EVERY 12 HOURS
Refills: 0 | Status: COMPLETED | OUTPATIENT
Start: 2022-11-03 | End: 2022-11-06

## 2022-10-29 RX ORDER — DEXMEDETOMIDINE HYDROCHLORIDE IN 0.9% SODIUM CHLORIDE 4 UG/ML
0.2 INJECTION INTRAVENOUS
Qty: 400 | Refills: 0 | Status: DISCONTINUED | OUTPATIENT
Start: 2022-10-29 | End: 2022-10-29

## 2022-10-29 RX ORDER — SODIUM CHLORIDE 9 MG/ML
1000 INJECTION INTRAMUSCULAR; INTRAVENOUS; SUBCUTANEOUS ONCE
Refills: 0 | Status: COMPLETED | OUTPATIENT
Start: 2022-10-29 | End: 2022-10-29

## 2022-10-29 RX ADMIN — SENNA PLUS 2 TABLET(S): 8.6 TABLET ORAL at 23:12

## 2022-10-29 RX ADMIN — Medication 25 MILLIGRAM(S): at 18:26

## 2022-10-29 RX ADMIN — Medication 10 MILLIGRAM(S): at 12:05

## 2022-10-29 RX ADMIN — Medication 4 MILLIGRAM(S): at 12:04

## 2022-10-29 RX ADMIN — CHLORHEXIDINE GLUCONATE 1 APPLICATION(S): 213 SOLUTION TOPICAL at 05:56

## 2022-10-29 RX ADMIN — Medication 100 MILLIGRAM(S): at 07:38

## 2022-10-29 RX ADMIN — Medication 650 MILLIGRAM(S): at 20:19

## 2022-10-29 RX ADMIN — Medication 4 MILLIGRAM(S): at 18:26

## 2022-10-29 RX ADMIN — QUETIAPINE FUMARATE 12.5 MILLIGRAM(S): 200 TABLET, FILM COATED ORAL at 23:11

## 2022-10-29 RX ADMIN — ENOXAPARIN SODIUM 40 MILLIGRAM(S): 100 INJECTION SUBCUTANEOUS at 23:11

## 2022-10-29 RX ADMIN — LEVETIRACETAM 500 MILLIGRAM(S): 250 TABLET, FILM COATED ORAL at 18:26

## 2022-10-29 RX ADMIN — Medication 4 MILLIGRAM(S): at 06:03

## 2022-10-29 RX ADMIN — LEVETIRACETAM 400 MILLIGRAM(S): 250 TABLET, FILM COATED ORAL at 06:03

## 2022-10-29 RX ADMIN — DEXMEDETOMIDINE HYDROCHLORIDE IN 0.9% SODIUM CHLORIDE 3.86 MICROGRAM(S)/KG/HR: 4 INJECTION INTRAVENOUS at 02:08

## 2022-10-29 RX ADMIN — LISINOPRIL 10 MILLIGRAM(S): 2.5 TABLET ORAL at 06:38

## 2022-10-29 RX ADMIN — MAGNESIUM OXIDE 400 MG ORAL TABLET 400 MILLIGRAM(S): 241.3 TABLET ORAL at 07:38

## 2022-10-29 RX ADMIN — Medication 100 MILLIGRAM(S): at 16:49

## 2022-10-29 RX ADMIN — SODIUM CHLORIDE 2000 MILLILITER(S): 9 INJECTION INTRAMUSCULAR; INTRAVENOUS; SUBCUTANEOUS at 03:36

## 2022-10-29 RX ADMIN — QUETIAPINE FUMARATE 12.5 MILLIGRAM(S): 200 TABLET, FILM COATED ORAL at 22:23

## 2022-10-29 RX ADMIN — Medication 650 MILLIGRAM(S): at 21:19

## 2022-10-29 RX ADMIN — PANTOPRAZOLE SODIUM 40 MILLIGRAM(S): 20 TABLET, DELAYED RELEASE ORAL at 07:38

## 2022-10-29 NOTE — PROGRESS NOTE ADULT - SUBJECTIVE AND OBJECTIVE BOX
=================================  NEUROCRITICAL CARE ATTENDING NOTE  =================================    VICKIE JARRETT   MRN-[id number]  Summary:  77y/F with pmh HTN who lives in [place] presents with left frontal mass, likely meningioma. Per son, meningioma was diagnosed upon CT head performed for memory loss and confusion. Son reports that confusion has been ongoing for several months. Patient admits to mild headache but denies diplopia, blurry vision, nausea, vomiting, extremity numbness or weakness and slurred speech. Patient is preop for bifrontal craniotomy for meningioma resection 10/27.  (26 Oct 2022 10:58)    COURSE IN THE HOSPITAL:  10/26 admitted to [facility]  10/27 s/p OR, slow to wake-up, post-op CT no significant findings  10/28 overnight -- nonverbal, not FC intermittently, given 250cc 3% bolus;  10/29 No significant events overnight.     Past Medical History: Meningioma Hypertension H/O hyperthyroidism   Allergies:  No Known Drug Allergies strawberry (Hives)  Home meds:   ·	aspirin 81 mg oral tablet, chewable: 0.5 tab(s) orally once a day, As Needed  ·	(last taken over 1 week ago)  ·	lisinopril 10 mg oral tablet: 1 tab(s) orally once a day  ·	metoprolol succinate 25 mg oral tablet, extended release: 1 tab(s) orally 2 times a day    PHYSICAL EXAMINATION  T(C): 36.3 (10-29 @ 05:30), Max: 37.5 (10-28 @ 21:25) HR: 73 (10-29 @ 07:00) (56 - 98) BP: 151/86 (10-29 @ 07:00) (85/48 - 156/86) RR: 16 (10-29 @ 05:00) (14 - 22) SpO2: 95% (10-29 @ 05:00) (93% - 99%)   NEUROLOGIC EXAMINATION:  Patient is  AOx1, confused, TESSIE, perseverates, moves all 4s; c/o BOV   GENERAL: not intubated, not in cardiorespiratory distress  EENT:  anicteric  CARDIOVASCULAR: (+) S1 S2, normal rate and regular rhythm  PULMONARY: clear to auscultation bilaterally  ABDOMEN: soft, nontender with normoactive bowel sounds  EXTREMITIES: no edema  SKIN: no rash    LABS: 10-29  CAPILLARY BLOOD GLUCOSE 109 116 114 105     (15.68)  9.9  (9.8)  13.42 )-----------( 171      ( 29 Oct 2022 05:50 )             30.7     145  |  114<H>  |  15  ----------------------------<  109<H>  4.2   |  22  |  0.75    Ca    8.8      29 Oct 2022 05:50  Phos  3.3     10-29  Mg     1.9     10-29    TPro  5.9<L>  /  Alb  3.4  /  TBili  <0.2  /  DBili  x   /  AST  16  /  ALT  7<L>  /  AlkPhos  65  10-27    10-28 @ 07:01  -  10-29 @ 07:00  IN: 2405 mL / OUT: 580 mL / NET: 1825 mL     Bacteriology:  CSF studies:  EEG:  Neuroimaging:  Other imaging:    MEDICATIONS: 10-29    ·	enoxaparin Injectable 40 SubCutaneous every 24 hours  ·	ceFAZolin   IVPB 2000 IV Intermittent every 8 hours  ·	levETIRAcetam 500 Oral two times a day  ·	lisinopril 10 milliGRAM(s) Oral daily  ·	metoprolol tartrate 25 milliGRAM(s) Oral every 12 hours  ·	pantoprazole    Tablet 40 Oral before breakfast  ·	senna 2 Oral at bedtime  ·	dexAMETHasone  Injectable 4 IV Push every 6 hours  ·	insulin lispro (ADMELOG) corrective regimen sliding scale  SubCutaneous Before meals and at bedtime  ·	acetaminophen     Tablet .. 650 Oral every 6 hours PRN  ·	bisacodyl 5 Oral daily PRN  ·	hydrALAZINE Injectable 10 IV Push every 2 hours PRN  ·	ketorolac   Injectable 15 IV Push every 6 hours PRN  ·	QUEtiapine 12.5 Oral at bedtime PRN    IV FLUIDS: NS@50cc/hr  DRIPS:  DIET: full liquid  Lines: [place]   Drains:    ·	SGx2   Wounds:    CODE STATUS:  Full Code                       GOALS OF CARE:  aggressive                      DISPOSITION:  ICU =================================  NEUROCRITICAL CARE ATTENDING NOTE  =================================    VICKIE JARRETT   MRN-[id number]  Summary:  77y/F with pmh HTN who lives in [place] presents with left frontal mass, likely meningioma. Per son, meningioma was diagnosed upon CT head performed for memory loss and confusion. Son reports that confusion has been ongoing for several months. Patient admits to mild headache but denies diplopia, blurry vision, nausea, vomiting, extremity numbness or weakness and slurred speech. Patient is preop for bifrontal craniotomy for meningioma resection 10/27.  (26 Oct 2022 10:58)    COURSE IN THE HOSPITAL:  10/26 admitted to [facility]  10/27 s/p OR, slow to wake-up, post-op CT no significant findings  10/28 overnight -- nonverbal, not FC intermittently, given 250cc 3% bolus;  10/29 No significant events overnight; Dexmedetomidine started overnight SBP soft and given fluids, resolved after sedation held    Past Medical History: Meningioma Hypertension H/O hyperthyroidism   Allergies:  No Known Drug Allergies strawberry (Hives)  Home meds:   ·	aspirin 81 mg oral tablet, chewable: 0.5 tab(s) orally once a day, As Needed  ·	(last taken over 1 week ago)  ·	lisinopril 10 mg oral tablet: 1 tab(s) orally once a day  ·	metoprolol succinate 25 mg oral tablet, extended release: 1 tab(s) orally 2 times a day    PHYSICAL EXAMINATION  T(C): 36.3 (10-29 @ 05:30), Max: 37.5 (10-28 @ 21:25) HR: 73 (10-29 @ 07:00) (56 - 98) BP: 151/86 (10-29 @ 07:00) (85/48 - 156/86) RR: 16 (10-29 @ 05:00) (14 - 22) SpO2: 95% (10-29 @ 05:00) (93% - 99%)   NEUROLOGIC EXAMINATION:  Patient is AOx2, confused, TESSIE, moves all 4s; c/o BOV   GENERAL: not intubated, not in cardiorespiratory distress  EENT:  anicteric  CARDIOVASCULAR: (+) S1 S2, normal rate and regular rhythm  PULMONARY: clear to auscultation bilaterally  ABDOMEN: soft, nontender with normoactive bowel sounds  EXTREMITIES: no edema  SKIN: no rash    LABS: 10-29  CAPILLARY BLOOD GLUCOSE 109 116 114 105     (15.68)  9.9  (9.8)  13.42 )-----------( 171      ( 29 Oct 2022 05:50 )             30.7     145  |  114<H>  |  15  ----------------------------<  109<H>  4.2   |  22  |  0.75    Ca    8.8      29 Oct 2022 05:50  Phos  3.3     10-29  Mg     1.9     10-29    TPro  5.9<L>  /  Alb  3.4  /  TBili  <0.2  /  DBili  x   /  AST  16  /  ALT  7<L>  /  AlkPhos  65  10-27    10-28 @ 07:01  -  10-29 @ 07:00  IN: 2405 mL / OUT: 580 mL / NET: 1825 mL     Bacteriology:  CSF studies:  EEG:  Neuroimaging:  Other imaging:    MEDICATIONS: 10-29    ·	enoxaparin Injectable 40 SubCutaneous every 24 hours  ·	ceFAZolin   IVPB 2000 IV Intermittent every 8 hours  ·	levETIRAcetam 500 Oral two times a day  ·	lisinopril 10 milliGRAM(s) Oral daily  ·	metoprolol tartrate 25 milliGRAM(s) Oral every 12 hours  ·	pantoprazole    Tablet 40 Oral before breakfast  ·	senna 2 Oral at bedtime  ·	dexAMETHasone  Injectable 4 IV Push every 6 hours  ·	insulin lispro (ADMELOG) corrective regimen sliding scale  SubCutaneous Before meals and at bedtime  ·	acetaminophen     Tablet .. 650 Oral every 6 hours PRN  ·	bisacodyl 5 Oral daily PRN  ·	hydrALAZINE Injectable 10 IV Push every 2 hours PRN  ·	ketorolac   Injectable 15 IV Push every 6 hours PRN  ·	QUEtiapine 12.5 Oral at bedtime PRN    IV FLUIDS: NS@50cc/hr  DRIPS:  DIET: full liquid  Lines: [place]   Drains:    ·	SGx2   Wounds:    CODE STATUS:  Full Code                       GOALS OF CARE:  aggressive                      DISPOSITION:  ICU

## 2022-10-29 NOTE — PROGRESS NOTE ADULT - ASSESSMENT
77y/F with  L parafalcine mass, meningioma, brain compression, cerebral edema, s/p bifrontal crani, resection (10/27/2022, Dr. Garnica)  Hypertension  h/o hyperthyroidism    PLAN:   NEURO: neurochecks q1h, VSq1h, PRN pain meds with acetaminophen, ketorolac, d/c opiates  continue steroids, taper as per neurosurgery, f/u final histopathology, MRI on stepdown  seizure prophylaxis:  levetiracetam 500mg PO BID, as per neurosurgery   keep SGx2  REHAB:  physical therapy evaluation and management    EARLY MOB:  OOB to chair     PULM:  room air, skull base precautions: no nose blowing, drinking with a straw, or bending below waist; NO incentive spirometry   CARDIO:  SBP goal 100-140mm Hg, continue lisinopril, metoprolol  ENDO:  Blood sugar goals 140-180 mg/dL, continue insulin sliding scale  GI:  PPI for GI prophylaxis while on steroids  DIET: full liquid - advance   RENAL:  IVF for now  HEM/ONC: Hb stable anemia  VTE Prophylaxis: SCDs, SQL tonight; baseline dopp NEG  ID: afebrile, leukocytosis, periop ancef - duration as per neurosurgery  Social: son updated this morning    Active issues:  What's keeping patient in the ICU? waxing and waning neuro exam  What is this patient's dispo plan? stepdown when stable    ATTENDING ATTESTATION:  I was physically present for the key portions of the evaluation and management (E/M) service provided.  I agree with the above history, physical and plan, which I have reviewed and edited where appropriate.    Patient at high risk for neurological deterioration or death due to:  ICU delirium, aspiration PNA, DVT / PE.  Critical care time:  I have personally provided 45 minutes of critical care time, excluding time spent on separate procedures.      Plan discussed with RN, house staff. 77y/F with  L parafalcine mass, meningioma, brain compression, cerebral edema, s/p bifrontal crani, resection (10/27/2022, Dr. Garnica)  Hypertension  h/o hyperthyroidism    PLAN:   NEURO: neurochecks q4h, VSq2h, PRN pain meds with acetaminophen, ketorolac, d/c opiates  continue steroids, taper as per neurosurgery, f/u final histopathology, MRI on stepdown  seizure prophylaxis:  levetiracetam 500mg PO BID, as per neurosurgery   delirium: seroquel 12.5 mg HS PRN   REHAB:  physical therapy evaluation and management    EARLY MOB:  OOB to chair     PULM:  room air, skull base precautions: no nose blowing, drinking with a straw, or bending below waist; NO incentive spirometry   CARDIO:  SBP goal 100-140mm Hg, continue lisinopril, metoprolol  ENDO:  Blood sugar goals 140-180 mg/dL, d/c insulin sliding scale  GI:  PPI for GI prophylaxis while on steroids  DIET: regular diet - add ensure (poor intake)  RENAL:  d/c IVF  HEM/ONC: Hb stable anemia  VTE Prophylaxis: SCDs, SQL; baseline dopp NEG  ID: afebrile, leukocytosis, periop ancef - duration as per neurosurgery  Social: son updated this morning    Active issues:  What's keeping patient in the ICU? nothing   What is this patient's dispo plan? stepdown        ATTENDING ATTESTATION:  I was physically present for the key portions of the evaluation and management (E/M) service provided.  I agree with the above history, physical and plan which I have reviewed and edited where appropriate.     Patient not at high risk for neurologic deterioration / death.  Time spent on this noncritically ill patient: 45 minutes spent on total encounter, more than 50% of the visit was spent counseling and/or coordinating care by the attending physician.    Plan discussed with RN, house staff.    REVIEW OF SYSTEMS:  No headaches, no nausea or vomiting; 14 -point review of systems otherwise unremarkable.      ICU stepdown Checklist:    Completed: 10-29 @ 09:41    [X] hemodynamically stable – VS WNL and stable x 24hours, UO adequate - hypotension is sedation-related, now off Dexmedetomidine   [n/a ] if  previously on HDA - off pressors x 24h with stable neuro exam    [X] no new symptoms x 24h (i.e. new fever, new-onset nausea/vomiting)  [X] stable labs: (i.e. WBC not rising, sodium not dropping)  [X] patient not at high risk for aspiration, if high risk then:                  [ ] should have definitive plans for trach/PEG (alternative option is to discharge from ICU to facilty)                  [ ] stepdown to bed close to nurse’s station  [n/a] low suctioning requirements (i.e. q4h or less)  [X] sign-off from primary RN* Shelby   [X] drains do not require ICU level of care  [X] if patient previously agitated or with behavioral issues – controlled  - has PRN seroquel ordered  [X] pain controlled

## 2022-10-29 NOTE — PROGRESS NOTE ADULT - SUBJECTIVE AND OBJECTIVE BOX
HPI:  77 year old female with pmh HTN who lives in [place] presents with left frontal mass, likely meningioma. Per son, meningioma was diagnosed upon CT head performed for memory loss and confusion. Son reports that confusion has been ongoing for several months. Patient admits to mild headache but denies diplopia, blurry vision, nausea, vomiting, extremity numbness or weakness and slurred speech. Patient is preop for bifrontal craniotomy for meningioma resection 10/27.   (26 Oct 2022 10:58)        Hospital Course:   10/27: POD# 0 S/P bifrontal craniotomy for removal of skull base mass (frozen: meningioma.) Postop, Pt reports mild headache otherwise neurologically stable. Waxing/waning neuro exam. CTH stable, but with significant edema. Na 143, given 250cc 3% bolus.   10/28: POD1. CTH overnight stable. Oxycodone dc'd due to intermittent lethargy. He removed pending TOV. Failed TOV, straight cath prn.   10/29: POD2. BALJINDER overnight, neuro stable. JPx2. Pending SDU today, MRI.        Vital Signs Last 24 Hrs  T(C): 37.5 (28 Oct 2022 21:25), Max: 37.5 (28 Oct 2022 21:25)  T(F): 99.5 (28 Oct 2022 21:25), Max: 99.5 (28 Oct 2022 21:25)  HR: 78 (28 Oct 2022 23:00) (64 - 98)  BP: 140/67 (28 Oct 2022 21:00) (119/58 - 152/72)  BP(mean): 96 (28 Oct 2022 21:00) (82 - 107)  RR: 19 (28 Oct 2022 23:00) (14 - 22)  SpO2: 96% (28 Oct 2022 23:00) (95% - 98%)    Parameters below as of 28 Oct 2022 23:00  Patient On (Oxygen Delivery Method): room air        I&O's Detail    27 Oct 2022 07:01  -  28 Oct 2022 07:00  --------------------------------------------------------  IN:    IV PiggyBack: 550 mL    Oral Fluid: 250 mL    sodium chloride 0.9%: 1312.5 mL    sodium chloride 3%: 250 mL  Total IN: 2362.5 mL    OUT:    Bulb (mL): 40 mL    Bulb (mL): 185 mL    Indwelling Catheter - Urethral (mL): 1420 mL  Total OUT: 1645 mL    Total NET: 717.5 mL      28 Oct 2022 07:01  -  29 Oct 2022 00:49  --------------------------------------------------------  IN:    IV PiggyBack: 200 mL    Oral Fluid: 180 mL    sodium chloride 0.9%: 75 mL    sodium chloride 0.9%: 750 mL  Total IN: 1205 mL    OUT:    Bulb (mL): 10 mL    Bulb (mL): 40 mL    Intermittent Catheterization - Urethral (mL): 275 mL  Total OUT: 325 mL    Total NET: 880 mL        I&O's Summary    27 Oct 2022 07:01  -  28 Oct 2022 07:00  --------------------------------------------------------  IN: 2362.5 mL / OUT: 1645 mL / NET: 717.5 mL    28 Oct 2022 07:01  -  29 Oct 2022 00:49  --------------------------------------------------------  IN: 1205 mL / OUT: 325 mL / NET: 880 mL        PHYSICAL EXAM:  General: Lying on stretcher, NAD.  Skin: No rashes or lesions.  HEENT: PERRL, brisk. Face symmetrical. Head wrap in place, c/d/i.  Cardiac: regular rate and rhythm.   lungs: No respiratory distress, good chest rise.  Abd: soft, nontender, nondistended  Neurological: AOx1-2. Opens eyes, follows commands, perseverating, mixed aphasia, SLADE strong non focal   Extremities: Warm, well perfused.         TUBES/LINES:  [] CVC  [] A-line  [] Lumbar Drain  [] Ventriculostomy  [] JUN  [] He  [] NGT   [] Other    DIET:  [] NPO  [] Mechanical  [] Tube feeds    LABS:                        9.8    15.68 )-----------( 164      ( 28 Oct 2022 04:58 )             30.2     10-28    147<H>  |  115<H>  |  11  ----------------------------<  130<H>  4.1   |  23  |  0.75    Ca    8.8      28 Oct 2022 04:58  Phos  4.1     10-27  Mg     1.7     10-27    TPro  5.9<L>  /  Alb  3.4  /  TBili  <0.2  /  DBili  x   /  AST  16  /  ALT  7<L>  /  AlkPhos  65  10-27    PT/INR - ( 27 Oct 2022 13:34 )   PT: 16.4 sec;   INR: 1.37          PTT - ( 27 Oct 2022 13:34 )  PTT:27.6 sec        CAPILLARY BLOOD GLUCOSE      POCT Blood Glucose.: 116 mg/dL (28 Oct 2022 21:31)  POCT Blood Glucose.: 114 mg/dL (28 Oct 2022 16:57)  POCT Blood Glucose.: 105 mg/dL (28 Oct 2022 12:07)  POCT Blood Glucose.: 121 mg/dL (28 Oct 2022 06:01)      Drug Levels: [] N/A    CSF Analysis: [] N/A      Allergies    No Known Drug Allergies  strawberry (Hives)    Intolerances        Home Medications:  aspirin 81 mg oral tablet, chewable: 0.5 tab(s) orally once a day, As Needed  (last taken over 1 week ago) (26 Oct 2022 14:35)  lisinopril 10 mg oral tablet: 1 tab(s) orally once a day (26 Oct 2022 14:35)  metoprolol succinate 25 mg oral tablet, extended release: 1 tab(s) orally 2 times a day (26 Oct 2022 14:35)      MEDICATIONS:  Antibiotics:  ceFAZolin   IVPB 2000 milliGRAM(s) IV Intermittent every 8 hours    Neuro:  acetaminophen     Tablet .. 650 milliGRAM(s) Oral every 6 hours PRN  ketorolac   Injectable 15 milliGRAM(s) IV Push every 6 hours PRN  levETIRAcetam  IVPB 500 milliGRAM(s) IV Intermittent every 12 hours    Anticoagulation:  enoxaparin Injectable 40 milliGRAM(s) SubCutaneous every 24 hours    OTHER:  bisacodyl 5 milliGRAM(s) Oral daily PRN  chlorhexidine 2% Cloths 1 Application(s) Topical <User Schedule>  dexAMETHasone  Injectable   IV Push   dexAMETHasone  Injectable 4 milliGRAM(s) IV Push every 6 hours  dextrose 50% Injectable 25 Gram(s) IV Push once  dextrose Oral Gel 15 Gram(s) Oral once PRN  glucagon  Injectable 1 milliGRAM(s) IntraMuscular once  hydrALAZINE Injectable 10 milliGRAM(s) IV Push every 3 hours PRN  insulin lispro (ADMELOG) corrective regimen sliding scale   SubCutaneous Before meals and at bedtime  lisinopril 10 milliGRAM(s) Oral daily  metoprolol tartrate 25 milliGRAM(s) Oral every 12 hours  pantoprazole    Tablet 40 milliGRAM(s) Oral before breakfast  senna 2 Tablet(s) Oral at bedtime    IVF:  dextrose 5%. 1000 milliLiter(s) IV Continuous <Continuous>  sodium chloride 0.9%. 1000 milliLiter(s) IV Continuous <Continuous>    CULTURES:    RADIOLOGY & ADDITIONAL TESTS:      ASSESSMENT:  77 year old female with pmh HTN found to have left frontal brain mass, likely meningioma, on workup for confusion and memory loss. Patient is now s/p bifrontal craniotomy for meningioma resection (frozen: meningioma) 10/27/22    Plan:  Neuro:  - neuro q4/vital checks q2  - pain control prn  - keppra for seizure prophylaxis  - decadron 4q6 x3d then taper over 1 week  - pending postop MRI brain  - subgaleal UJN x2, monitor output  - CTH 10/27 - stable, significant edema    Cardiac:  - -140  - lisinopril held, home metoprolol 25 BID (tartrate inpatient, was on ER at home)    Pulmonary:  - NC prn    GI:  - Regular diet  - bowel regimen  - PPI while on steroids    Renal:  - Na goal 140-150; s/p 3% bolus  - straight cath prn    Heme:  - SCDs for now for DVT ppx/ SQL  - 10/26 LE dopplers neg    Endo:  - no issues  - ISS  - A1C= 5.1    ID:  - afebrile  - postop ancef while drains in place; plan to DC on Augmentin x 10days per neuroplastics    Disposition: ICU status, full code, dispo pending PT/OT  Family updated with plan    D/w Dr. Garnica and Dr. Shook        Assessment:  Present when checked    []  GCS  E   V  M     Heart Failure: []Acute, [] acute on chronic , []chronic  Heart Failure:  [] Diastolic (HFpEF), [] Systolic (HFrEF), []Combined (HFpEF and HFrEF), [] RHF, [] Pulm HTN, [] Other    [] OZZIE, [] ATN, [] AIN, [] other  [] CKD1, [] CKD2, [] CKD 3, [] CKD 4, [] CKD 5, []ESRD    Encephalopathy: [] Metabolic, [] Hepatic, [] toxic, [] Neurological, [] Other    Abnormal Nurtitional Status: [] malnurtition (see nutrition note), [ ]underweight: BMI < 19, [] morbid obesity: BMI >40, [] Cachexia    [] Sepsis  [] hypovolemic shock,[] cardiogenic shock, [] hemorrhagic shock, [] neuogenic shock  [] Acute Respiratory Failure  []Cerebral edema, [] Brain compression/ herniation,   [] Functional quadriplegia  [] Acute blood loss anemia

## 2022-10-30 LAB
ANION GAP SERPL CALC-SCNC: 9 MMOL/L — SIGNIFICANT CHANGE UP (ref 5–17)
BUN SERPL-MCNC: 20 MG/DL — SIGNIFICANT CHANGE UP (ref 7–23)
CALCIUM SERPL-MCNC: 8.9 MG/DL — SIGNIFICANT CHANGE UP (ref 8.4–10.5)
CHLORIDE SERPL-SCNC: 115 MMOL/L — HIGH (ref 96–108)
CO2 SERPL-SCNC: 23 MMOL/L — SIGNIFICANT CHANGE UP (ref 22–31)
CREAT SERPL-MCNC: 0.74 MG/DL — SIGNIFICANT CHANGE UP (ref 0.5–1.3)
EGFR: 84 ML/MIN/1.73M2 — SIGNIFICANT CHANGE UP
GLUCOSE SERPL-MCNC: 100 MG/DL — HIGH (ref 70–99)
HCT VFR BLD CALC: 27.4 % — LOW (ref 34.5–45)
HGB BLD-MCNC: 9 G/DL — LOW (ref 11.5–15.5)
MAGNESIUM SERPL-MCNC: 2.6 MG/DL — SIGNIFICANT CHANGE UP (ref 1.6–2.6)
MCHC RBC-ENTMCNC: 22.6 PG — LOW (ref 27–34)
MCHC RBC-ENTMCNC: 32.8 GM/DL — SIGNIFICANT CHANGE UP (ref 32–36)
MCV RBC AUTO: 68.7 FL — LOW (ref 80–100)
NRBC # BLD: 0 /100 WBCS — SIGNIFICANT CHANGE UP (ref 0–0)
PHOSPHATE SERPL-MCNC: 3.4 MG/DL — SIGNIFICANT CHANGE UP (ref 2.5–4.5)
PLATELET # BLD AUTO: 155 K/UL — SIGNIFICANT CHANGE UP (ref 150–400)
POTASSIUM SERPL-MCNC: 4 MMOL/L — SIGNIFICANT CHANGE UP (ref 3.5–5.3)
POTASSIUM SERPL-SCNC: 4 MMOL/L — SIGNIFICANT CHANGE UP (ref 3.5–5.3)
RBC # BLD: 3.99 M/UL — SIGNIFICANT CHANGE UP (ref 3.8–5.2)
RBC # FLD: 15.8 % — HIGH (ref 10.3–14.5)
SODIUM SERPL-SCNC: 147 MMOL/L — HIGH (ref 135–145)
WBC # BLD: 11.23 K/UL — HIGH (ref 3.8–10.5)
WBC # FLD AUTO: 11.23 K/UL — HIGH (ref 3.8–10.5)

## 2022-10-30 PROCEDURE — 99232 SBSQ HOSP IP/OBS MODERATE 35: CPT

## 2022-10-30 PROCEDURE — 70553 MRI BRAIN STEM W/O & W/DYE: CPT | Mod: 26

## 2022-10-30 RX ORDER — BACITRACIN ZINC 500 UNIT/G
1 OINTMENT IN PACKET (EA) TOPICAL ONCE
Refills: 0 | Status: COMPLETED | OUTPATIENT
Start: 2022-10-30 | End: 2022-10-30

## 2022-10-30 RX ORDER — METOPROLOL TARTRATE 50 MG
50 TABLET ORAL
Refills: 0 | Status: DISCONTINUED | OUTPATIENT
Start: 2022-10-30 | End: 2022-11-15

## 2022-10-30 RX ADMIN — SENNA PLUS 2 TABLET(S): 8.6 TABLET ORAL at 22:06

## 2022-10-30 RX ADMIN — Medication 4 MILLIGRAM(S): at 22:06

## 2022-10-30 RX ADMIN — LEVETIRACETAM 500 MILLIGRAM(S): 250 TABLET, FILM COATED ORAL at 17:33

## 2022-10-30 RX ADMIN — PANTOPRAZOLE SODIUM 40 MILLIGRAM(S): 20 TABLET, DELAYED RELEASE ORAL at 06:11

## 2022-10-30 RX ADMIN — Medication 25 MILLIGRAM(S): at 06:11

## 2022-10-30 RX ADMIN — Medication 4 MILLIGRAM(S): at 15:08

## 2022-10-30 RX ADMIN — LEVETIRACETAM 500 MILLIGRAM(S): 250 TABLET, FILM COATED ORAL at 06:11

## 2022-10-30 RX ADMIN — Medication 10 MILLIGRAM(S): at 05:23

## 2022-10-30 RX ADMIN — Medication 650 MILLIGRAM(S): at 17:33

## 2022-10-30 RX ADMIN — Medication 650 MILLIGRAM(S): at 18:30

## 2022-10-30 RX ADMIN — Medication 1 APPLICATION(S): at 16:26

## 2022-10-30 RX ADMIN — Medication 100 MILLIGRAM(S): at 01:01

## 2022-10-30 RX ADMIN — ENOXAPARIN SODIUM 40 MILLIGRAM(S): 100 INJECTION SUBCUTANEOUS at 22:06

## 2022-10-30 RX ADMIN — LISINOPRIL 10 MILLIGRAM(S): 2.5 TABLET ORAL at 06:11

## 2022-10-30 RX ADMIN — Medication 100 MILLIGRAM(S): at 16:23

## 2022-10-30 RX ADMIN — Medication 4 MILLIGRAM(S): at 00:53

## 2022-10-30 RX ADMIN — Medication 50 MILLIGRAM(S): at 17:33

## 2022-10-30 RX ADMIN — Medication 100 MILLIGRAM(S): at 07:19

## 2022-10-30 RX ADMIN — Medication 4 MILLIGRAM(S): at 06:11

## 2022-10-30 NOTE — PROVIDER CONTACT NOTE (OTHER) - ACTION/TREATMENT ORDERED:
12 lead EKG
hydralazine and toradol to be given per orders
No new orders for now, will continue to monitor.

## 2022-10-30 NOTE — PROGRESS NOTE ADULT - SUBJECTIVE AND OBJECTIVE BOX
INTERVAL EVENTS: Was agitated overnight. Given seroquel. She is very calm this AM and pleasant. Denies any pain or discharge from incision site. Denies dizziness/ syncope/ N/V.     PAST MEDICAL & SURGICAL HISTORY:  Meningioma    Hypertension    H/O hyperthyroidism  s/p thyroid surgery, no longer on medication    Status post thyroid surgery        MEDICATIONS  (STANDING):  BACItracin   Ointment 1 Application(s) Topical once  ceFAZolin   IVPB 2000 milliGRAM(s) IV Intermittent every 8 hours  dexAMETHasone     Tablet   Oral   dexAMETHasone     Tablet 4 milliGRAM(s) Oral every 8 hours  enoxaparin Injectable 40 milliGRAM(s) SubCutaneous every 24 hours  levETIRAcetam 500 milliGRAM(s) Oral two times a day  lisinopril 10 milliGRAM(s) Oral daily  metoprolol tartrate 50 milliGRAM(s) Oral two times a day  pantoprazole    Tablet 40 milliGRAM(s) Oral before breakfast  senna 2 Tablet(s) Oral at bedtime    MEDICATIONS  (PRN):  acetaminophen     Tablet .. 650 milliGRAM(s) Oral every 6 hours PRN Mild Pain (1 - 3)  bisacodyl 5 milliGRAM(s) Oral daily PRN Constipation  hydrALAZINE Injectable 10 milliGRAM(s) IV Push every 2 hours PRN SBP>160  QUEtiapine 12.5 milliGRAM(s) Oral at bedtime PRN sleep/agitaion    Vital Signs Last 24 Hrs  T(C): 36.6 (30 Oct 2022 12:14), Max: 37.1 (29 Oct 2022 17:25)  T(F): 97.9 (30 Oct 2022 12:14), Max: 98.8 (29 Oct 2022 17:25)  HR: 89 (30 Oct 2022 12:14) (75 - 97)  BP: 115/66 (30 Oct 2022 12:14) (115/66 - 169/89)  BP(mean): --  RR: 17 (30 Oct 2022 12:14) (14 - 18)  SpO2: 97% (30 Oct 2022 12:14) (94% - 97%)    Parameters below as of 30 Oct 2022 12:14  Patient On (Oxygen Delivery Method): room air        PHYSICAL EXAM:  GEN: Awake, alert. NAD.   HEENT: NCAT, PERRL, EOMI. Mucosa moist. Incision site clean and dry   RESP: CTA b/l  CV: RRR. Normal S1/S2. No m/r/g.  ABD: Soft. NT/ND. BS+  EXT: Warm. No edema, clubbing, or cyanosis.   NEURO: AAOx 2. slightly confused     LABS:                        9.0    11.23 )-----------( 155      ( 30 Oct 2022 05:47 )             27.4     10-30    147<H>  |  115<H>  |  20  ----------------------------<  100<H>  4.0   |  23  |  0.74    Ca    8.9      30 Oct 2022 05:47  Phos  3.4     10-30  Mg     2.6     10-30              I&O's Summary    29 Oct 2022 07:01  -  30 Oct 2022 07:00  --------------------------------------------------------  IN: 50 mL / OUT: 2230 mL / NET: -2180 mL

## 2022-10-30 NOTE — PROVIDER CONTACT NOTE (OTHER) - SITUATION
IV tylenol administered early at 14:50 per PA Ahmed with no change in pain or SBP
Pt had a run of PATs w/ HR reaching 136.
Telemetry monitor tech contacted RN to report 3 beats of Vtach followed by normal sinus beat and then 6 beats of vtach at 2:29PM.

## 2022-10-30 NOTE — PROGRESS NOTE ADULT - SUBJECTIVE AND OBJECTIVE BOX
HPI:  77 year old female with pmh HTN who lives in [place] presents with left frontal mass, likely meningioma. Per son, meningioma was diagnosed upon CT head performed for memory loss and confusion. Son reports that confusion has been ongoing for several months. Patient admits to mild headache but denies diplopia, blurry vision, nausea, vomiting, extremity numbness or weakness and slurred speech. Patient is preop for bifrontal craniotomy for meningioma resection 10/27.   (26 Oct 2022 10:58)    OVERNIGHT EVENTS: given additional 12.5 mg seroquel and placed on wrist restraints overnight for agitation. Wrist restraints removed.     Hospital Course:   10/27: POD# 0 S/P bifrontal craniotomy for removal of skull base mass (frozen: meningioma.) Postop, Pt reports mild headache otherwise neurologically stable. Waxing/waning neuro exam. CTH stable, but with significant edema. Na 143, given 250cc 3% bolus.   10/28: POD1. CTH overnight stable. Oxycodone dc'd due to intermittent lethargy. He removed pending TOV. Failed TOV, straight cath prn.   10/29: POD2. Neuro stable. JPx2. Agitated overnight, precedex started transiently. SBP drop, precedex dc'd and 1L bolus given. Started on seroquel 12.5 at bedtime prn.  10/30: POD3, given additional 12.5 mg seroquel and placed on wrist restraints overnight for agitation. Wrist restraints removed. JUN x 2.     Vital Signs Last 24 Hrs  T(C): 36.7 (29 Oct 2022 20:30), Max: 37.1 (29 Oct 2022 17:25)  T(F): 98.1 (29 Oct 2022 20:30), Max: 98.8 (29 Oct 2022 17:25)  HR: 87 (29 Oct 2022 20:30) (56 - 92)  BP: 151/84 (29 Oct 2022 20:30) (85/48 - 174/98)  BP(mean): 111 (29 Oct 2022 10:30) (63 - 113)  RR: 16 (29 Oct 2022 20:30) (14 - 17)  SpO2: 95% (29 Oct 2022 20:30) (93% - 98%)    Parameters below as of 29 Oct 2022 20:30  Patient On (Oxygen Delivery Method): room air        I&O's Summary    28 Oct 2022 07:01  -  29 Oct 2022 07:00  --------------------------------------------------------  IN: 2405 mL / OUT: 580 mL / NET: 1825 mL    29 Oct 2022 07:01  -  30 Oct 2022 01:33  --------------------------------------------------------  IN: 0 mL / OUT: 1710 mL / NET: -1710 mL        PHYSICAL EXAM:  General: patient seen laying supine in bed in NAD  Neuro: AAOx2 (self, place), FC, OE spontaneously, speech confused, CNII-XI grossly intact, face symmetric, no pronator drift, strength 5/5 b/l UE and LE, sensation grossly intact to light touch throughout  HEENT: PERRL, EOMI  Neck: supple  Cardiac: RRR, S1S2  Pulmonary: chest rise symmetric  Abdomen: soft, nontender, nondistended  Ext: perfusing well  Skin: warm, dry  Wound: Crani incision site with headwrap in place    TUBES/LINES:  [] He  [] Lumbar Drain  [x] Wound Drains- JUN x 2   [] Others      DIET:  [] NPO  [x] Mechanical  [] Tube feeds    LABS:                        9.9    13.42 )-----------( 171      ( 29 Oct 2022 05:50 )             30.7     10-29    145  |  114<H>  |  15  ----------------------------<  109<H>  4.2   |  22  |  0.75    Ca    8.8      29 Oct 2022 05:50  Phos  3.3     10-29  Mg     1.9     10-29              CAPILLARY BLOOD GLUCOSE      POCT Blood Glucose.: 109 mg/dL (29 Oct 2022 07:35)      Drug Levels: [] N/A    CSF Analysis: [] N/A      Allergies    No Known Drug Allergies  strawberry (Hives)    Intolerances      MEDICATIONS:  Antibiotics:  ceFAZolin   IVPB 2000 milliGRAM(s) IV Intermittent every 8 hours    Neuro:  acetaminophen     Tablet .. 650 milliGRAM(s) Oral every 6 hours PRN  levETIRAcetam 500 milliGRAM(s) Oral two times a day  QUEtiapine 12.5 milliGRAM(s) Oral at bedtime PRN    Anticoagulation:  enoxaparin Injectable 40 milliGRAM(s) SubCutaneous every 24 hours    OTHER:  bisacodyl 5 milliGRAM(s) Oral daily PRN  dexAMETHasone     Tablet   Oral   dexAMETHasone     Tablet 4 milliGRAM(s) Oral every 6 hours  dexAMETHasone     Tablet 4 milliGRAM(s) Oral every 8 hours  hydrALAZINE Injectable 10 milliGRAM(s) IV Push every 2 hours PRN  lisinopril 10 milliGRAM(s) Oral daily  metoprolol tartrate 25 milliGRAM(s) Oral every 12 hours  pantoprazole    Tablet 40 milliGRAM(s) Oral before breakfast  senna 2 Tablet(s) Oral at bedtime    IVF:    CULTURES:    RADIOLOGY & ADDITIONAL TESTS:      ASSESSMENT:  77 year old female with pmh HTN found to have left frontal brain mass, likely meningioma, on workup for confusion and memory loss. Patient is now s/p bifrontal craniotomy for meningioma resection (frozen: meningioma) 10/27/22      HEADACHE    Handoff    MEWS Score    Meningioma    Hypertension    H/O hyperthyroidism    Brain tumor    Brain tumor    Tension type headache    Meningioma    Hypertension    Preoperative testing    Preoperative examination    Prophylactic measure    Craniotomy for meningioma    Status post thyroid surgery    HEADACHE    Hypertension    [email or web address]        PLAN:  Neuro:  - neuro q4/vital checks q4  - pain control prn  - keppra for seizure prophylaxis  - decadron 4q6 x3d then taper over 1 week  - pending postop MRI brain  - subgaleal JUN x2, monitor output  - CTH 10/27 - stable, significant edema    Cardiac:  - -140  - lisinopril held, home metoprolol 25 BID (tartrate inpatient, was on ER at home)    Pulmonary:  - NC prn    GI:  - Regular diet  - bowel regimen  - PPI while on steroids    Renal:  - Na goal 140-150  - straight cath prn    Heme:  - SCDs for now for DVT ppx/ SQL  - 10/26 LE dopplers neg    Endo:  - no issues  - A1C= 5.1    ID:  - afebrile  - postop ancef while drains in place; when drains out, Augmentin x 10days per neuroplastics    Disposition: SDU status, full code, PT/OT recs [facility]  Family updated with plan    D/w Dr. Garnica and Dr. Shook      Assessment:  Present when checked    []  GCS  E   V  M     Heart Failure: []Acute, [] acute on chronic , []chronic  Heart Failure:  [] Diastolic (HFpEF), [] Systolic (HFrEF), []Combined (HFpEF and HFrEF), [] RHF, [] Pulm HTN, [] Other    [] OZZIE, [] ATN, [] AIN, [] other  [] CKD1, [] CKD2, [] CKD 3, [] CKD 4, [] CKD 5, []ESRD    Encephalopathy: [] Metabolic, [] Hepatic, [] toxic, [] Neurological, [] Other    Abnormal Nurtitional Status: [] malnurtition (see nutrition note), [ ]underweight: BMI < 19, [] morbid obesity: BMI >40, [] Cachexia    [] Sepsis  [] hypovolemic shock,[] cardiogenic shock, [] hemorrhagic shock, [] neuogenic shock  [] Acute Respiratory Failure  []Cerebral edema, [] Brain compression/ herniation,   [] Functional quadriplegia  [] Acute blood loss anemia

## 2022-10-30 NOTE — PROGRESS NOTE ADULT - ASSESSMENT
76 yo F hx of HTN, Hyperthyroidism who presented w/ likely L frontal meningioma and is s/p bifrontal craniotomy with meningioma resection 10/27    #Meningioma  Post op course complicated by agitation. Was briefly on Precedex drip   Was agitated with staff last night. Received seroquel  and is currently on 1:1  -c/w steroids, PPI and SSI  - Seizure prophylaxis per NSGY  - Rest of care per primary team     # Agitation   As noted above  - She is now very calm on exam  - During periods of agitation, would redirect  - Please avoid wrist restrataints  - Seroquel 12.5mg at bedtime for agitation    #Anemia  Baseline Hb 11.2--> 9  - Likely some component of blood loss anemia but MCV low and RDW high, would send iron studies ( ferritin, iron, TIBC, iron sat)   - Will likely need iron supplementation     # HTN   Home meds: Toprol 25mf BID and lisinopril 10mg   - Currently on lopressor 25mg BID--> would increase to 50mg BID and avoid IV BP meds  - c/w lisinopril 10mg     Dispo: PT--> AR 76 yo F hx of HTN, Hyperthyroidism who presented w/ likely L frontal meningioma and is s/p bifrontal craniotomy with meningioma resection 10/27    #Meningioma  Post op course complicated by agitation. Was briefly on Precedex drip   Was agitated with staff last night. Received seroquel  and is currently on 1:1  -c/w steroids, PPI and SSI  - Seizure prophylaxis per NSGY  - Rest of care per primary team     # Agitation   As noted above  - She is now very calm on exam  - During periods of agitation, would redirect  - Please avoid wrist restrataints  - Seroquel 12.5mg at bedtime for agitation    #Anemia  Baseline Hb 11.2--> 9  - Likely some component of blood loss anemia but MCV low and RDW high, would send iron studies ( ferritin, iron, TIBC, iron sat)   - Will likely need iron supplementation     #Leukocytosis  - No signs of active infection. Currently on post op abx prophylaxis   - Likely from steroids  - Improving on CBC today     # HTN   Home meds: Toprol 25mf BID and lisinopril 10mg   - Currently on lopressor 25mg BID--> would increase to 50mg BID and avoid IV BP meds  - c/w lisinopril 10mg     Dispo: PT--> [facility]

## 2022-10-30 NOTE — PROVIDER CONTACT NOTE (OTHER) - ASSESSMENT
/62, , o2 95% on room air. Pt denied chest pain, chest discomfort, palpitations, SOB.
Pt denied any chest pain and nonverbal indicators of chest pain were absent. VS= 76, 111/67, 97% on room air.

## 2022-10-31 LAB
ANION GAP SERPL CALC-SCNC: 10 MMOL/L — SIGNIFICANT CHANGE UP (ref 5–17)
BUN SERPL-MCNC: 18 MG/DL — SIGNIFICANT CHANGE UP (ref 7–23)
CALCIUM SERPL-MCNC: 8.9 MG/DL — SIGNIFICANT CHANGE UP (ref 8.4–10.5)
CHLORIDE SERPL-SCNC: 113 MMOL/L — HIGH (ref 96–108)
CO2 SERPL-SCNC: 24 MMOL/L — SIGNIFICANT CHANGE UP (ref 22–31)
CREAT SERPL-MCNC: 0.74 MG/DL — SIGNIFICANT CHANGE UP (ref 0.5–1.3)
EGFR: 84 ML/MIN/1.73M2 — SIGNIFICANT CHANGE UP
FERRITIN SERPL-MCNC: 105 NG/ML — SIGNIFICANT CHANGE UP (ref 15–150)
GLUCOSE SERPL-MCNC: 108 MG/DL — HIGH (ref 70–99)
HCT VFR BLD CALC: 29.1 % — LOW (ref 34.5–45)
HGB BLD-MCNC: 9.6 G/DL — LOW (ref 11.5–15.5)
IRON SATN MFR SERPL: 36 % — SIGNIFICANT CHANGE UP (ref 14–50)
IRON SATN MFR SERPL: 64 UG/DL — SIGNIFICANT CHANGE UP (ref 30–160)
MAGNESIUM SERPL-MCNC: 1.9 MG/DL — SIGNIFICANT CHANGE UP (ref 1.6–2.6)
MCHC RBC-ENTMCNC: 22.6 PG — LOW (ref 27–34)
MCHC RBC-ENTMCNC: 33 GM/DL — SIGNIFICANT CHANGE UP (ref 32–36)
MCV RBC AUTO: 68.6 FL — LOW (ref 80–100)
NRBC # BLD: 0 /100 WBCS — SIGNIFICANT CHANGE UP (ref 0–0)
PHOSPHATE SERPL-MCNC: 3.3 MG/DL — SIGNIFICANT CHANGE UP (ref 2.5–4.5)
PLATELET # BLD AUTO: 166 K/UL — SIGNIFICANT CHANGE UP (ref 150–400)
POTASSIUM SERPL-MCNC: 4.1 MMOL/L — SIGNIFICANT CHANGE UP (ref 3.5–5.3)
POTASSIUM SERPL-SCNC: 4.1 MMOL/L — SIGNIFICANT CHANGE UP (ref 3.5–5.3)
RBC # BLD: 4.24 M/UL — SIGNIFICANT CHANGE UP (ref 3.8–5.2)
RBC # FLD: 15.6 % — HIGH (ref 10.3–14.5)
SODIUM SERPL-SCNC: 147 MMOL/L — HIGH (ref 135–145)
TIBC SERPL-MCNC: 176 UG/DL — LOW (ref 220–430)
TRANSFERRIN SERPL-MCNC: 159 MG/DL — LOW (ref 200–360)
UIBC SERPL-MCNC: 112 UG/DL — SIGNIFICANT CHANGE UP (ref 110–370)
WBC # BLD: 9.86 K/UL — SIGNIFICANT CHANGE UP (ref 3.8–10.5)
WBC # FLD AUTO: 9.86 K/UL — SIGNIFICANT CHANGE UP (ref 3.8–10.5)

## 2022-10-31 PROCEDURE — 99232 SBSQ HOSP IP/OBS MODERATE 35: CPT

## 2022-10-31 RX ORDER — OLANZAPINE 15 MG/1
5 TABLET, FILM COATED ORAL ONCE
Refills: 0 | Status: COMPLETED | OUTPATIENT
Start: 2022-10-31 | End: 2022-10-31

## 2022-10-31 RX ORDER — HALOPERIDOL DECANOATE 100 MG/ML
1 INJECTION INTRAMUSCULAR ONCE
Refills: 0 | Status: COMPLETED | OUTPATIENT
Start: 2022-10-31 | End: 2022-10-31

## 2022-10-31 RX ORDER — BACITRACIN ZINC 500 UNIT/G
1 OINTMENT IN PACKET (EA) TOPICAL ONCE
Refills: 0 | Status: DISCONTINUED | OUTPATIENT
Start: 2022-10-31 | End: 2022-10-31

## 2022-10-31 RX ORDER — BACITRACIN ZINC 500 UNIT/G
1 OINTMENT IN PACKET (EA) TOPICAL ONCE
Refills: 0 | Status: COMPLETED | OUTPATIENT
Start: 2022-10-31 | End: 2022-10-31

## 2022-10-31 RX ORDER — QUETIAPINE FUMARATE 200 MG/1
75 TABLET, FILM COATED ORAL ONCE
Refills: 0 | Status: DISCONTINUED | OUTPATIENT
Start: 2022-10-31 | End: 2022-10-31

## 2022-10-31 RX ORDER — QUETIAPINE FUMARATE 200 MG/1
100 TABLET, FILM COATED ORAL EVERY 12 HOURS
Refills: 0 | Status: DISCONTINUED | OUTPATIENT
Start: 2022-10-31 | End: 2022-11-01

## 2022-10-31 RX ORDER — LANOLIN ALCOHOL/MO/W.PET/CERES
10 CREAM (GRAM) TOPICAL AT BEDTIME
Refills: 0 | Status: DISCONTINUED | OUTPATIENT
Start: 2022-10-31 | End: 2022-11-08

## 2022-10-31 RX ORDER — OLANZAPINE 15 MG/1
5 TABLET, FILM COATED ORAL ONCE
Refills: 0 | Status: DISCONTINUED | OUTPATIENT
Start: 2022-10-31 | End: 2022-10-31

## 2022-10-31 RX ADMIN — SENNA PLUS 2 TABLET(S): 8.6 TABLET ORAL at 22:11

## 2022-10-31 RX ADMIN — Medication 4 MILLIGRAM(S): at 05:45

## 2022-10-31 RX ADMIN — Medication 4 MILLIGRAM(S): at 22:11

## 2022-10-31 RX ADMIN — Medication 10 MILLIGRAM(S): at 22:52

## 2022-10-31 RX ADMIN — Medication 1 TABLET(S): at 17:05

## 2022-10-31 RX ADMIN — HALOPERIDOL DECANOATE 1 MILLIGRAM(S): 100 INJECTION INTRAMUSCULAR at 18:24

## 2022-10-31 RX ADMIN — Medication 100 MILLIGRAM(S): at 00:46

## 2022-10-31 RX ADMIN — OLANZAPINE 5 MILLIGRAM(S): 15 TABLET, FILM COATED ORAL at 17:31

## 2022-10-31 RX ADMIN — OLANZAPINE 5 MILLIGRAM(S): 15 TABLET, FILM COATED ORAL at 19:53

## 2022-10-31 RX ADMIN — Medication 100 MILLIGRAM(S): at 08:02

## 2022-10-31 RX ADMIN — LEVETIRACETAM 500 MILLIGRAM(S): 250 TABLET, FILM COATED ORAL at 17:00

## 2022-10-31 RX ADMIN — LEVETIRACETAM 500 MILLIGRAM(S): 250 TABLET, FILM COATED ORAL at 05:45

## 2022-10-31 RX ADMIN — ENOXAPARIN SODIUM 40 MILLIGRAM(S): 100 INJECTION SUBCUTANEOUS at 22:16

## 2022-10-31 RX ADMIN — PANTOPRAZOLE SODIUM 40 MILLIGRAM(S): 20 TABLET, DELAYED RELEASE ORAL at 05:45

## 2022-10-31 RX ADMIN — LISINOPRIL 10 MILLIGRAM(S): 2.5 TABLET ORAL at 05:45

## 2022-10-31 RX ADMIN — QUETIAPINE FUMARATE 100 MILLIGRAM(S): 200 TABLET, FILM COATED ORAL at 22:11

## 2022-10-31 RX ADMIN — HALOPERIDOL DECANOATE 1 MILLIGRAM(S): 100 INJECTION INTRAMUSCULAR at 17:55

## 2022-10-31 RX ADMIN — QUETIAPINE FUMARATE 12.5 MILLIGRAM(S): 200 TABLET, FILM COATED ORAL at 00:46

## 2022-10-31 RX ADMIN — Medication 50 MILLIGRAM(S): at 05:45

## 2022-10-31 RX ADMIN — Medication 4 MILLIGRAM(S): at 13:54

## 2022-10-31 RX ADMIN — Medication 50 MILLIGRAM(S): at 17:01

## 2022-10-31 RX ADMIN — OLANZAPINE 5 MILLIGRAM(S): 15 TABLET, FILM COATED ORAL at 23:59

## 2022-10-31 NOTE — BH CONSULTATION LIAISON ASSESSMENT NOTE - CURRENT MEDICATION
MEDICATIONS  (STANDING):  amoxicillin  500 milliGRAM(s)/clavulanate 1 Tablet(s) Oral every 12 hours  dexAMETHasone     Tablet   Oral   dexAMETHasone     Tablet 4 milliGRAM(s) Oral every 8 hours  enoxaparin Injectable 40 milliGRAM(s) SubCutaneous every 24 hours  levETIRAcetam 500 milliGRAM(s) Oral two times a day  lisinopril 10 milliGRAM(s) Oral daily  metoprolol tartrate 50 milliGRAM(s) Oral two times a day  pantoprazole    Tablet 40 milliGRAM(s) Oral before breakfast  QUEtiapine 100 milliGRAM(s) Oral every 12 hours  senna 2 Tablet(s) Oral at bedtime    MEDICATIONS  (PRN):  acetaminophen     Tablet .. 650 milliGRAM(s) Oral every 6 hours PRN Mild Pain (1 - 3)  bisacodyl 5 milliGRAM(s) Oral daily PRN Constipation  hydrALAZINE Injectable 10 milliGRAM(s) IV Push every 2 hours PRN SBP>160

## 2022-10-31 NOTE — BH CONSULTATION LIAISON ASSESSMENT NOTE - HPI (INCLUDE ILLNESS QUALITY, SEVERITY, DURATION, TIMING, CONTEXT, MODIFYING FACTORS, ASSOCIATED SIGNS AND SYMPTOMS)
Ms. Jennings is a 77 year old female with pmh HTN, Hyperthyroidism, with no PPH who lives in [place] originally presented with left frontal mass, likely meningioma. Per son, meningioma was diagnosed upon CT head performed for memory loss and confusion. Son reports that confusion has been ongoing for several months. Patient admits to mild headache but denies diplopia, blurry vision, nausea, vomiting, extremity numbness or weakness and slurred speech. Patient is preop for bifrontal craniotomy for meningioma resection 10/27. The patient had an episode of confusion requiring restraints overnight 10/29-10/30, given seroquel 12.5 mg with wrist restraints removed the following morning. Patient on a dexamethasome taper. Psychiatry consulted due to escalating behaviors, patient attempting to elope, A&Ox1 lacking capacity to leave AMA, She received two haldol 1 mg IM medications for acute agitaiton, followed by a zyprexa 5 mg IM, followed by another Zyprexa 5 mg IM. Patient also had ripped the wrist restraints. Requiring assistants from security. EKG on 10/29 had a 1tc of 450, follow up EKG performed after dosages of antipsychotic found qtc 454. Assessment is limited but patient is notably confused with waxing ans waning sensorium difficult to redirect. Denies AH, VH, stating that "I need to leave" while attempting to remove restraints. Patient denies chest pain at this time and started appearing sleepier through the interview with continuing notable agitation.  Ms. Jennings is a 77 year old female with pmh HTN, Hyperthyroidism, with no PPH who lives in [place] originally presented with left frontal mass, likely meningioma. Per son, meningioma was diagnosed upon CT head performed for memory loss and confusion. Son reports that confusion has been ongoing for several months. Patient admits to mild headache but denies diplopia, blurry vision, nausea, vomiting, extremity numbness or weakness and slurred speech. Patient is s/p bifrontal craniotomy for meningioma resection 10/27 currently on a dexamethasone taper. The patient had an episode of confusion requiring restraints overnight 10/29-10/30, given seroquel 12.5 mg with wrist restraints removed the following morning.      Psychiatry consulted due to escalating behaviors, patient attempting to elope, A&Ox1 lacking capacity to leave AMA. She received two haldol 1 mg IM medications for acute agitaiton, followed by a zyprexa 5 mg IM, followed by another Zyprexa 5 mg IM. Patient also had ripped the wrist restraints requiring assistance from security. EKG on 10/29 had a QTC of 450, follow up EKG performed after dosages of antipsychotic found qtc 454. Assessment is limited but patient is notably confused with waxing ans waning sensorium and difficult to redirect. Denies AH, VH, stating that "I need to leave" while attempting to remove restraints. Patient denies chest pain at this time and started appearing sleepier through the interview with continuing notable agitation.

## 2022-10-31 NOTE — PROGRESS NOTE ADULT - SUBJECTIVE AND OBJECTIVE BOX
SUBJECTIVE:  Patient seen and examined at bedside, daughter present.  Patient continues to have some confusion, pleasant.  Has a mild headache today that improves with pain medication.  Has had limited PO intake, a few glasses of water yesterday.  Small BM yesterday AM    ROS:  Denies fevers, chills, vision changes, neck pain, cough, SOB, chest pain, Abdominal pain, N/V, dysuria or new rash.  All other ROS negative except as above    Vital Signs Last 12 Hrs  T(F): 98.7 (10-31-22 @ 09:37), Max: 98.9 (10-31-22 @ 00:20)  HR: 86 (10-31-22 @ 09:37) (68 - 86)  BP: 122/74 (10-31-22 @ 09:37) (122/74 - 161/85)  BP(mean): --  RR: 17 (10-31-22 @ 09:37) (16 - 18)  SpO2: 96% (10-31-22 @ 09:37) (95% - 96%)  I&O's Summary    30 Oct 2022 07:01  -  31 Oct 2022 07:00  --------------------------------------------------------  IN: 0 mL / OUT: 25 mL / NET: -25 mL        PHYSICAL EXAM:  Constitutional: NAD, comfortable in bed.  HEENT: PERRLA, EOMI, no conjunctival pallor or scleral icterus, MMM, head wrapped and JUN drain in place, ecchymosis around L eye  Neck: Supple  Respiratory: CTA B/L. No w/r/r.   Cardiovascular: RRR, normal S1 and S2, no m/r/g.   Gastrointestinal: +BS, soft NTND, no guarding or rebound tenderness, no palpable masses   Extremities: wwp; no cyanosis, clubbing or edema.   Vascular: Pulses equal and strong throughout.   Neurological: AAOx2, no CN deficits, strength and sensation intact throughout.   Skin: No gross skin abnormalities or rashes        LABS:                        9.6    9.86  )-----------( 166      ( 31 Oct 2022 06:37 )             29.1     10-31    147<H>  |  113<H>  |  18  ----------------------------<  108<H>  4.1   |  24  |  0.74    Ca    8.9      31 Oct 2022 06:37  Phos  3.3     10-31  Mg     1.9     10-31              RADIOLOGY & ADDITIONAL TESTS:  No new imaging    MEDICATIONS  (STANDING):  amoxicillin  500 milliGRAM(s)/clavulanate 1 Tablet(s) Oral every 12 hours  dexAMETHasone     Tablet   Oral   dexAMETHasone     Tablet 4 milliGRAM(s) Oral every 8 hours  enoxaparin Injectable 40 milliGRAM(s) SubCutaneous every 24 hours  levETIRAcetam 500 milliGRAM(s) Oral two times a day  lisinopril 10 milliGRAM(s) Oral daily  metoprolol tartrate 50 milliGRAM(s) Oral two times a day  pantoprazole    Tablet 40 milliGRAM(s) Oral before breakfast  senna 2 Tablet(s) Oral at bedtime    MEDICATIONS  (PRN):  acetaminophen     Tablet .. 650 milliGRAM(s) Oral every 6 hours PRN Mild Pain (1 - 3)  bisacodyl 5 milliGRAM(s) Oral daily PRN Constipation  hydrALAZINE Injectable 10 milliGRAM(s) IV Push every 2 hours PRN SBP>160  QUEtiapine 12.5 milliGRAM(s) Oral at bedtime PRN sleep/agitaion

## 2022-10-31 NOTE — BH CONSULTATION LIAISON ASSESSMENT NOTE - NSBHCHARTREVIEWVS_PSY_A_CORE FT
Vital Signs Last 24 Hrs  T(C): 36.3 (31 Oct 2022 17:30), Max: 37.2 (31 Oct 2022 00:20)  T(F): 97.4 (31 Oct 2022 17:30), Max: 98.9 (31 Oct 2022 00:20)  HR: 112 (31 Oct 2022 17:30) (68 - 112)  BP: 144/87 (31 Oct 2022 17:30) (122/74 - 161/85)  BP(mean): --  RR: 16 (31 Oct 2022 17:30) (16 - 18)  SpO2: 95% (31 Oct 2022 17:30) (95% - 100%)    Parameters below as of 31 Oct 2022 17:30  Patient On (Oxygen Delivery Method): room air

## 2022-10-31 NOTE — PROCEDURE NOTE - ADDITIONAL PROCEDURE DETAILS
Drain taken off suction. Site cleaned with chlorhexidine. Anchoring suture cut. Drain pulled with no resistance. Bacitracin and gauze placed at drain exit site and head re-wrapped.
Drain taken off suction. Site cleaned with chlorhexidine. Anchoring suture cut. Drain pulled with no resistance. Bacitracin/gauze placed at drain exit site.

## 2022-10-31 NOTE — PROGRESS NOTE ADULT - SUBJECTIVE AND OBJECTIVE BOX
HPI:  77 year old female with pmh HTN who lives in [place] presents with left frontal mass, likely meningioma. Per son, meningioma was diagnosed upon CT head performed for memory loss and confusion. Son reports that confusion has been ongoing for several months. Patient admits to mild headache but denies diplopia, blurry vision, nausea, vomiting, extremity numbness or weakness and slurred speech. Patient is preop for bifrontal craniotomy for meningioma resection 10/27.   (26 Oct 2022 10:58)    OVERNIGHT EVENTS: BALJINDER overnight     HOSPITAL COURSE:,  10/27: POD# 0 S/P bifrontal craniotomy for removal of skull base mass (frozen: meningioma.) Postop, Pt reports mild headache otherwise neurologically stable. Waxing/waning neuro exam. CTH stable, but with significant edema. Na 143, given 250cc 3% bolus.   10/28: POD1. CTH overnight stable. Oxycodone dc'd due to intermittent lethargy. He removed pending TOV. Failed TOV, straight cath prn.   10/29: POD2. Neuro stable. JPx2. Agitated overnight, precedex started transiently. SBP drop, precedex dc'd and 1L bolus given. Started on seroquel 12.5 at bedtime prn.  10/30: POD3, given additional 12.5 mg seroquel and placed on wrist restraints overnight for agitation. Wrist restraints removed. JUN # 2 removed. Metoprolol increased to 50 BID. Iron studies ordered for microcytic anemia.    10/31: POD4 BALJINDER overnight         Vital Signs Last 24 Hrs  T(C): 37.2 (31 Oct 2022 00:20), Max: 37.2 (31 Oct 2022 00:20)  T(F): 98.9 (31 Oct 2022 00:20), Max: 98.9 (31 Oct 2022 00:20)  HR: 72 (31 Oct 2022 00:20) (72 - 97)  BP: 142/84 (31 Oct 2022 00:20) (115/66 - 169/89)  BP(mean): --  RR: 18 (31 Oct 2022 00:20) (16 - 18)  SpO2: 96% (31 Oct 2022 00:20) (94% - 100%)    Parameters below as of 31 Oct 2022 00:20  Patient On (Oxygen Delivery Method): room air        I&O's Summary    29 Oct 2022 07:01  -  30 Oct 2022 07:00  --------------------------------------------------------  IN: 50 mL / OUT: 2230 mL / NET: -2180 mL    30 Oct 2022 07:01  -  31 Oct 2022 01:30  --------------------------------------------------------  IN: 0 mL / OUT: 5 mL / NET: -5 mL        PHYSICAL EXAM:  General: NAD, pt is comfortably sitting up in bed, on room air  HEENT: EOMI b/l, face symmetric, tongue midline, neck FROM  Cardiovascular: Regular rate and rhythm  Respiratory: nonlabored breathing, normal chest rise  GI: abdomen soft, nontender, nondistended  Neuro: CN II-XII grossly intact, PERRL 3mm briskly reactive   A&Ox3, expressive aphasia speech clear, no dysmetria, no pronator drift. Follows commands.  SLADE x4 spontaneously, 5/5 strength in all extremities throughout. sensation intact  Extremities: distal pulses 2+ x4  Wound/incision: headwrap in place  Drain: JPx1    TUBES/LINES:  [] He  [x] Wound Drains: JPx1  [] Others      DIET:  [] NPO  [x] Mechanical  [] Tube feeds    LABS:                        9.0    11.23 )-----------( 155      ( 30 Oct 2022 05:47 )             27.4     10-30    147<H>  |  115<H>  |  20  ----------------------------<  100<H>  4.0   |  23  |  0.74    Ca    8.9      30 Oct 2022 05:47  Phos  3.4     10-30  Mg     2.6     10-30              CAPILLARY BLOOD GLUCOSE          Drug Levels: [] N/A    CSF Analysis: [] N/A      Allergies    No Known Drug Allergies  strawberry (Hives)    Intolerances      MEDICATIONS:  Antibiotics:  ceFAZolin   IVPB 2000 milliGRAM(s) IV Intermittent every 8 hours    Neuro:  acetaminophen     Tablet .. 650 milliGRAM(s) Oral every 6 hours PRN  levETIRAcetam 500 milliGRAM(s) Oral two times a day  QUEtiapine 12.5 milliGRAM(s) Oral at bedtime PRN    Anticoagulation:  enoxaparin Injectable 40 milliGRAM(s) SubCutaneous every 24 hours    OTHER:  bisacodyl 5 milliGRAM(s) Oral daily PRN  dexAMETHasone     Tablet   Oral   dexAMETHasone     Tablet 4 milliGRAM(s) Oral every 8 hours  hydrALAZINE Injectable 10 milliGRAM(s) IV Push every 2 hours PRN  lisinopril 10 milliGRAM(s) Oral daily  metoprolol tartrate 50 milliGRAM(s) Oral two times a day  pantoprazole    Tablet 40 milliGRAM(s) Oral before breakfast  senna 2 Tablet(s) Oral at bedtime    IVF:    CULTURES:    RADIOLOGY & ADDITIONAL TESTS:    < from: MR Head w/wo IV Cont (10.30.22 @ 14:40) >    IMPRESSION: Patient status post bifrontal craniotomy and frontal sinus   obliteration procedure for gross total resection of meningioma along the   floor of the left anterior cranial fossa.      < from: CT Head No Cont (10.27.22 @ 21:00) >  IMPRESSION:  Surgical changes forleft frontal mass resection. No evidence of   postsurgical  complication.      ASSESSMENT:  77 year old female with pmh HTN found to have left frontal brain mass, likely meningioma, on workup for confusion and memory loss. Patient is now s/p bifrontal craniotomy for meningioma resection (frozen: meningioma) 10/27/22          PLAN:    Neuro:  - neuro checks/vital signs q4hes   - pain control PRN  - keppra 500mg BID for seizure ppx  - decadron 4q6 x3d then taper over 1 week  - postop MRI brain done 10/30  - subgaleal #1JP, monitor output  - JUN #2 removed 10/30  - CTH 10/27 - stable, significant edema  - Seroquel 25mg QHS PRN    Cardiac:  - -140  - cont home lisinopril 10mg qd   - Increased home metoprolol to 50mg BID 10/30    Pulmonary:  - on RA, IS    GI:  - Regular diet  - bowel regimen  - PPI while on steroids    Renal:  - IVL, voiding  - straight cath PRN     Heme:  - SCDs/SQL for DVT ppx  - LE dopplers 10/26: neg for DVT  - Microcytic anemia: f/u iron studies     Endo:  - A1C 5.1, no issues    ID:  - afebrile, trend leukocytosis  - postop ancef while drains in place; when drains out, Augmentin x 10days per neuroplastics    Disposition: SDU status, full code, PT/OT recs AR, Family updated with plan    Assessment and Plan d/w Dr. Garnica

## 2022-10-31 NOTE — PROGRESS NOTE ADULT - ASSESSMENT
76 yo F hx of HTN, Hyperthyroidism who presented w/ likely L frontal meningioma and is s/p bifrontal craniotomy with meningioma resection 10/27    #Meningioma  Post op course complicated by agitation. Was briefly on Precedex drip. Now calm  - c/w steroids, PPI and SSI  - Seizure prophylaxis per NSGY  - Rest of care per primary team   - JUN drain monitoring per primary team    #Agitation   - Calm this AM, some confusion   - During periods of agitation, would redirect  - Please avoid wrist restrataints  - Seroquel 12.5mg at bedtime for agitation    #Hypernatremia  - likely due to poor PO intake, Na 147 today  - encouraged increased water intake, will monitor    #Anemia  Baseline Hb 11.2--> 9  - Likely some component of blood loss anemia  - iron panel consistent with inflammatory anemia    #Leukocytosis  Resolved  - No signs of active infection. Currently on post op abx prophylaxis   - Likely from steroids    # HTN   Home meds: Toprol 25mg BID and lisinopril 10mg   - BP more at goal on increased Lopressor 50mg BID  - c/w lisinopril 10mg     DVT ppx: Lovenox, SCDs    Dispo: PT--> AR

## 2022-10-31 NOTE — BH CONSULTATION LIAISON ASSESSMENT NOTE - SUMMARY
Ms. Jennings is a 77 year old female with pmh HTN, Hyperthyroidism, with no PPH who lives in [place] originally presented with left frontal mass, likely meningioma. Per son, meningioma was diagnosed upon CT head performed for memory loss and confusion. Son reports that confusion has been ongoing for several months. Patient admits to mild headache but denies diplopia, blurry vision, nausea, vomiting, extremity numbness or weakness and slurred speech. Patient is preop for bifrontal craniotomy for meningioma resection 10/27. Psychiatry consulted due to escalated agitation and request for medication recommendations for acute agitation. On assessment the patient has waxing and waning in her sensorium, is only alert to person, is notably agitated and aggressive, attempting to leave the unit. Intermittently able to be soothed. This is concerning for a metabolic encephalopathy (delirium) in the setting of bifrontal craniotomy).  on second EKG 10/31, Zyprexa can be given every 8 hours, Seroquel has been shown to decrease ICP and improve agitation in TBI patients can given 50 mg Q6 or 100 mg Q12. Have lights off at night and lights on during the day. Minimize overstimulation to help prevent confusion.     Plan:  Zyprexa 10 mg IM/PO PRN q 8 hours for acute agitation  Seroquel 50 mg PO Q6 PRN or Seroquel 100 mg PO q12 hours PRN  Melatonin 10 mg nightly or sleep wake cycle   - Avoid benzodiazepines and anticholinergic agents these can worsen agitation Ms. Jennings is a 77 year old female with pmh HTN, Hyperthyroidism, with no PPH who lives in [place] originally presented with left frontal mass, likely meningioma. Per son, meningioma was diagnosed upon CT head performed for memory loss and confusion. Son reports that confusion has been ongoing for several months. Patient admits to mild headache but denies diplopia, blurry vision, nausea, vomiting, extremity numbness or weakness and slurred speech. Patient is preop for bifrontal craniotomy for meningioma resection 10/27. Psychiatry consulted due to escalated agitation and request for medication recommendations for acute agitation. On assessment the patient has waxing and waning in her sensorium, is only alert to person, is notably agitated and aggressive, attempting to leave the unit. Intermittently able to be soothed. This is concerning for a metabolic encephalopathy (delirium) in the setting of bifrontal craniotomy.  on second EKG 10/31, Zyprexa can be given every 8 hours, Seroquel has been shown to decrease ICP and improve agitation in TBI patients, it can given 50 mg Q6 or 100 mg Q12. Have lights off at night and lights on during the day. Minimize overstimulation to help prevent confusion.     Plan:  Zyprexa 10 mg IM/PO PRN q 8 hours for acute agitation  Seroquel 50 mg PO Q6 PRN or Seroquel 100 mg PO q12 hours PRN  Melatonin 10 mg nightly or sleep wake cycle   - Avoid benzodiazepines and anticholinergic agents these can worsen agitation

## 2022-11-01 LAB
ANION GAP SERPL CALC-SCNC: 10 MMOL/L — SIGNIFICANT CHANGE UP (ref 5–17)
ANION GAP SERPL CALC-SCNC: 11 MMOL/L — SIGNIFICANT CHANGE UP (ref 5–17)
APPEARANCE UR: CLEAR — SIGNIFICANT CHANGE UP
BILIRUB UR-MCNC: NEGATIVE — SIGNIFICANT CHANGE UP
BUN SERPL-MCNC: 18 MG/DL — SIGNIFICANT CHANGE UP (ref 7–23)
BUN SERPL-MCNC: 19 MG/DL — SIGNIFICANT CHANGE UP (ref 7–23)
CALCIUM SERPL-MCNC: 9 MG/DL — SIGNIFICANT CHANGE UP (ref 8.4–10.5)
CALCIUM SERPL-MCNC: 9.3 MG/DL — SIGNIFICANT CHANGE UP (ref 8.4–10.5)
CHLORIDE SERPL-SCNC: 110 MMOL/L — HIGH (ref 96–108)
CHLORIDE SERPL-SCNC: 111 MMOL/L — HIGH (ref 96–108)
CO2 SERPL-SCNC: 25 MMOL/L — SIGNIFICANT CHANGE UP (ref 22–31)
CO2 SERPL-SCNC: 25 MMOL/L — SIGNIFICANT CHANGE UP (ref 22–31)
COLOR SPEC: YELLOW — SIGNIFICANT CHANGE UP
CREAT SERPL-MCNC: 0.65 MG/DL — SIGNIFICANT CHANGE UP (ref 0.5–1.3)
CREAT SERPL-MCNC: 0.69 MG/DL — SIGNIFICANT CHANGE UP (ref 0.5–1.3)
DIFF PNL FLD: NEGATIVE — SIGNIFICANT CHANGE UP
EGFR: 90 ML/MIN/1.73M2 — SIGNIFICANT CHANGE UP
EGFR: 92 ML/MIN/1.73M2 — SIGNIFICANT CHANGE UP
GLUCOSE BLDC GLUCOMTR-MCNC: 189 MG/DL — HIGH (ref 70–99)
GLUCOSE SERPL-MCNC: 110 MG/DL — HIGH (ref 70–99)
GLUCOSE SERPL-MCNC: 114 MG/DL — HIGH (ref 70–99)
GLUCOSE UR QL: NEGATIVE — SIGNIFICANT CHANGE UP
HCT VFR BLD CALC: 32 % — LOW (ref 34.5–45)
HCT VFR BLD CALC: 33.5 % — LOW (ref 34.5–45)
HGB BLD-MCNC: 10.5 G/DL — LOW (ref 11.5–15.5)
HGB BLD-MCNC: 10.8 G/DL — LOW (ref 11.5–15.5)
KETONES UR-MCNC: NEGATIVE — SIGNIFICANT CHANGE UP
LEUKOCYTE ESTERASE UR-ACNC: NEGATIVE — SIGNIFICANT CHANGE UP
MAGNESIUM SERPL-MCNC: 1.9 MG/DL — SIGNIFICANT CHANGE UP (ref 1.6–2.6)
MAGNESIUM SERPL-MCNC: 2.1 MG/DL — SIGNIFICANT CHANGE UP (ref 1.6–2.6)
MCHC RBC-ENTMCNC: 22.3 PG — LOW (ref 27–34)
MCHC RBC-ENTMCNC: 22.6 PG — LOW (ref 27–34)
MCHC RBC-ENTMCNC: 32.2 GM/DL — SIGNIFICANT CHANGE UP (ref 32–36)
MCHC RBC-ENTMCNC: 32.8 GM/DL — SIGNIFICANT CHANGE UP (ref 32–36)
MCV RBC AUTO: 69 FL — LOW (ref 80–100)
MCV RBC AUTO: 69.1 FL — LOW (ref 80–100)
NITRITE UR-MCNC: NEGATIVE — SIGNIFICANT CHANGE UP
NRBC # BLD: 0 /100 WBCS — SIGNIFICANT CHANGE UP (ref 0–0)
NRBC # BLD: 0 /100 WBCS — SIGNIFICANT CHANGE UP (ref 0–0)
PH UR: 6.5 — SIGNIFICANT CHANGE UP (ref 5–8)
PHOSPHATE SERPL-MCNC: 3.6 MG/DL — SIGNIFICANT CHANGE UP (ref 2.5–4.5)
PHOSPHATE SERPL-MCNC: 4 MG/DL — SIGNIFICANT CHANGE UP (ref 2.5–4.5)
PLATELET # BLD AUTO: 180 K/UL — SIGNIFICANT CHANGE UP (ref 150–400)
PLATELET # BLD AUTO: 201 K/UL — SIGNIFICANT CHANGE UP (ref 150–400)
POTASSIUM SERPL-MCNC: 4 MMOL/L — SIGNIFICANT CHANGE UP (ref 3.5–5.3)
POTASSIUM SERPL-MCNC: 4.2 MMOL/L — SIGNIFICANT CHANGE UP (ref 3.5–5.3)
POTASSIUM SERPL-SCNC: 4 MMOL/L — SIGNIFICANT CHANGE UP (ref 3.5–5.3)
POTASSIUM SERPL-SCNC: 4.2 MMOL/L — SIGNIFICANT CHANGE UP (ref 3.5–5.3)
PROT UR-MCNC: NEGATIVE MG/DL — SIGNIFICANT CHANGE UP
RBC # BLD: 4.64 M/UL — SIGNIFICANT CHANGE UP (ref 3.8–5.2)
RBC # BLD: 4.85 M/UL — SIGNIFICANT CHANGE UP (ref 3.8–5.2)
RBC # FLD: 15.8 % — HIGH (ref 10.3–14.5)
RBC # FLD: 15.9 % — HIGH (ref 10.3–14.5)
SODIUM SERPL-SCNC: 146 MMOL/L — HIGH (ref 135–145)
SODIUM SERPL-SCNC: 146 MMOL/L — HIGH (ref 135–145)
SP GR SPEC: 1.01 — SIGNIFICANT CHANGE UP (ref 1–1.03)
UROBILINOGEN FLD QL: 0.2 E.U./DL — SIGNIFICANT CHANGE UP
WBC # BLD: 11.69 K/UL — HIGH (ref 3.8–10.5)
WBC # BLD: 9.82 K/UL — SIGNIFICANT CHANGE UP (ref 3.8–10.5)
WBC # FLD AUTO: 11.69 K/UL — HIGH (ref 3.8–10.5)
WBC # FLD AUTO: 9.82 K/UL — SIGNIFICANT CHANGE UP (ref 3.8–10.5)

## 2022-11-01 PROCEDURE — 99233 SBSQ HOSP IP/OBS HIGH 50: CPT

## 2022-11-01 PROCEDURE — 99232 SBSQ HOSP IP/OBS MODERATE 35: CPT

## 2022-11-01 PROCEDURE — 70450 CT HEAD/BRAIN W/O DYE: CPT | Mod: 26

## 2022-11-01 PROCEDURE — 99291 CRITICAL CARE FIRST HOUR: CPT

## 2022-11-01 RX ORDER — QUETIAPINE FUMARATE 200 MG/1
25 TABLET, FILM COATED ORAL AT BEDTIME
Refills: 0 | Status: DISCONTINUED | OUTPATIENT
Start: 2022-11-01 | End: 2022-11-05

## 2022-11-01 RX ORDER — QUETIAPINE FUMARATE 200 MG/1
100 TABLET, FILM COATED ORAL
Refills: 0 | Status: DISCONTINUED | OUTPATIENT
Start: 2022-11-01 | End: 2022-11-01

## 2022-11-01 RX ORDER — BRIVARACETAM 25 MG/1
50 TABLET, FILM COATED ORAL EVERY 12 HOURS
Refills: 0 | Status: ACTIVE | OUTPATIENT
Start: 2022-11-01 | End: 2022-12-01

## 2022-11-01 RX ORDER — BENZOCAINE AND MENTHOL 5; 1 G/100ML; G/100ML
1 LIQUID ORAL DAILY
Refills: 0 | Status: DISCONTINUED | OUTPATIENT
Start: 2022-11-01 | End: 2022-11-18

## 2022-11-01 RX ORDER — QUETIAPINE FUMARATE 200 MG/1
25 TABLET, FILM COATED ORAL
Refills: 0 | Status: DISCONTINUED | OUTPATIENT
Start: 2022-11-01 | End: 2022-11-05

## 2022-11-01 RX ORDER — QUETIAPINE FUMARATE 200 MG/1
50 TABLET, FILM COATED ORAL
Refills: 0 | Status: DISCONTINUED | OUTPATIENT
Start: 2022-11-01 | End: 2022-11-01

## 2022-11-01 RX ORDER — QUETIAPINE FUMARATE 200 MG/1
50 TABLET, FILM COATED ORAL AT BEDTIME
Refills: 0 | Status: DISCONTINUED | OUTPATIENT
Start: 2022-11-01 | End: 2022-11-01

## 2022-11-01 RX ORDER — QUETIAPINE FUMARATE 200 MG/1
100 TABLET, FILM COATED ORAL AT BEDTIME
Refills: 0 | Status: DISCONTINUED | OUTPATIENT
Start: 2022-11-01 | End: 2022-11-01

## 2022-11-01 RX ORDER — QUETIAPINE FUMARATE 200 MG/1
100 TABLET, FILM COATED ORAL EVERY 12 HOURS
Refills: 0 | Status: DISCONTINUED | OUTPATIENT
Start: 2022-11-01 | End: 2022-11-01

## 2022-11-01 RX ADMIN — Medication 1 TABLET(S): at 07:23

## 2022-11-01 RX ADMIN — LISINOPRIL 10 MILLIGRAM(S): 2.5 TABLET ORAL at 07:23

## 2022-11-01 RX ADMIN — Medication 4 MILLIGRAM(S): at 07:23

## 2022-11-01 RX ADMIN — Medication 2 MILLIGRAM(S): at 21:56

## 2022-11-01 RX ADMIN — Medication 10 MILLIGRAM(S): at 10:35

## 2022-11-01 RX ADMIN — Medication 50 MILLIGRAM(S): at 07:23

## 2022-11-01 RX ADMIN — Medication 1 TABLET(S): at 18:47

## 2022-11-01 RX ADMIN — SENNA PLUS 2 TABLET(S): 8.6 TABLET ORAL at 21:57

## 2022-11-01 RX ADMIN — Medication 2 MILLIGRAM(S): at 15:55

## 2022-11-01 RX ADMIN — Medication 10 MILLIGRAM(S): at 21:56

## 2022-11-01 RX ADMIN — LEVETIRACETAM 500 MILLIGRAM(S): 250 TABLET, FILM COATED ORAL at 18:47

## 2022-11-01 RX ADMIN — ENOXAPARIN SODIUM 40 MILLIGRAM(S): 100 INJECTION SUBCUTANEOUS at 21:57

## 2022-11-01 RX ADMIN — LEVETIRACETAM 500 MILLIGRAM(S): 250 TABLET, FILM COATED ORAL at 07:23

## 2022-11-01 RX ADMIN — Medication 50 MILLIGRAM(S): at 17:15

## 2022-11-01 RX ADMIN — QUETIAPINE FUMARATE 25 MILLIGRAM(S): 200 TABLET, FILM COATED ORAL at 22:49

## 2022-11-01 RX ADMIN — PANTOPRAZOLE SODIUM 40 MILLIGRAM(S): 20 TABLET, DELAYED RELEASE ORAL at 07:23

## 2022-11-01 NOTE — PROGRESS NOTE ADULT - SUBJECTIVE AND OBJECTIVE BOX
SUBJECTIVE:  Patient seen and examined at bedside.  confused but calm and pleasant this AM.  Off restraints.  Alert and oriented to herself but very tangential speech    ROS:  Denies fevers, chills, cough, SOB, chest pain, Abdominal pain, N/V, dysuria.  All other ROS negative except as above    Vital Signs Last 12 Hrs  T(F): 97.8 (11-01-22 @ 09:24), Max: 97.8 (11-01-22 @ 09:24)  HR: 83 (11-01-22 @ 09:24) (83 - 84)  BP: 113/75 (11-01-22 @ 12:26) (113/75 - 189/98)  BP(mean): 122 (11-01-22 @ 06:34) (122 - 122)  RR: 18 (11-01-22 @ 09:24) (18 - 18)  SpO2: 97% (11-01-22 @ 09:24) (97% - 97%)  I&O's Summary    31 Oct 2022 07:01  -  01 Nov 2022 07:00  --------------------------------------------------------  IN: 600 mL / OUT: 1100 mL / NET: -500 mL    01 Nov 2022 07:01  -  01 Nov 2022 13:48  --------------------------------------------------------  IN: 0 mL / OUT: 400 mL / NET: -400 mL        PHYSICAL EXAM:  Constitutional: NAD, comfortable in bed.  HEENT: CHIP, WILLIAM, JUN drains removed, incisions c/d/i  Neck: Supple  Respiratory: CTA B/L. No w/r/r.   Cardiovascular: RRR, normal S1 and S2, no m/r/g.   Gastrointestinal: +BS, soft NTND, no guarding or rebound tenderness, no palpable masses   Extremities: wwp; no cyanosis, clubbing or edema.   Vascular: Pulses equal and strong throughout.   Neurological: AAOx1, confused, tangential speech but pleasant, moves all extremities spontaneously  Skin: No gross skin abnormalities or rashes        LABS:                        10.5   9.82  )-----------( 180      ( 01 Nov 2022 05:30 )             32.0     11-01    146<H>  |  111<H>  |  19  ----------------------------<  114<H>  4.0   |  25  |  0.69    Ca    9.0      01 Nov 2022 05:30  Phos  3.6     11-01  Mg     1.9     11-01              RADIOLOGY & ADDITIONAL TESTS:  No new imaging    MEDICATIONS  (STANDING):  amoxicillin  500 milliGRAM(s)/clavulanate 1 Tablet(s) Oral every 12 hours  benzocaine 15 mG/menthol 3.6 mG Lozenge 1 Lozenge Oral daily  dexAMETHasone     Tablet   Oral   dexAMETHasone     Tablet 2 milliGRAM(s) Oral every 8 hours  enoxaparin Injectable 40 milliGRAM(s) SubCutaneous every 24 hours  levETIRAcetam 500 milliGRAM(s) Oral two times a day  lisinopril 10 milliGRAM(s) Oral daily  melatonin 10 milliGRAM(s) Oral at bedtime  metoprolol tartrate 50 milliGRAM(s) Oral two times a day  pantoprazole    Tablet 40 milliGRAM(s) Oral before breakfast  senna 2 Tablet(s) Oral at bedtime    MEDICATIONS  (PRN):  acetaminophen     Tablet .. 650 milliGRAM(s) Oral every 6 hours PRN Mild Pain (1 - 3)  bisacodyl 5 milliGRAM(s) Oral daily PRN Constipation  hydrALAZINE Injectable 10 milliGRAM(s) IV Push every 2 hours PRN SBP>160  QUEtiapine 100 milliGRAM(s) Oral every 12 hours PRN agitation

## 2022-11-01 NOTE — PROGRESS NOTE ADULT - ASSESSMENT
77y/F with  L parafalcine mass, meningioma, brain compression, cerebral edema, s/p bifrontal crani, resection (10/27/2022, Dr. Garnica)  Hypertension  h/o hyperthyroidism    PLAN:   NEURO: neurochecks q4h, VSq2h, PRN pain meds with acetaminophen, ketorolac, d/c opiates  continue steroids, taper as per neurosurgery, f/u final histopathology, MRI on stepdown  seizure prophylaxis:  levetiracetam 500mg PO BID, as per neurosurgery   delirium: seroquel 12.5 mg HS PRN   REHAB:  physical therapy evaluation and management    EARLY MOB:  OOB to chair     PULM:  room air, skull base precautions: no nose blowing, drinking with a straw, or bending below waist; NO incentive spirometry   CARDIO:  SBP goal 100-140mm Hg, continue lisinopril, metoprolol  ENDO:  Blood sugar goals 140-180 mg/dL, d/c insulin sliding scale  GI:  PPI for GI prophylaxis while on steroids  DIET: regular diet - add ensure (poor intake)  RENAL:  d/c IVF  HEM/ONC: Hb stable anemia  VTE Prophylaxis: SCDs, SQL; baseline dopp NEG  ID: afebrile, leukocytosis, periop ancef - duration as per neurosurgery  Social: son updated this morning    Active issues:  What's keeping patient in the ICU? nothing   What is this patient's dispo plan? stepdown        ATTENDING ATTESTATION:  I was physically present for the key portions of the evaluation and management (E/M) service provided.  I agree with the above history, physical and plan which I have reviewed and edited where appropriate.     Patient not at high risk for neurologic deterioration / death.  Time spent on this noncritically ill patient: 45 minutes spent on total encounter, more than 50% of the visit was spent counseling and/or coordinating care by the attending physician.    Plan discussed with RN, house staff.    REVIEW OF SYSTEMS:  No headaches, no nausea or vomiting; 14 -point review of systems otherwise unremarkable.      ICU stepdown Checklist:    Completed: 10-29 @ 09:41    [X] hemodynamically stable – VS WNL and stable x 24hours, UO adequate - hypotension is sedation-related, now off Dexmedetomidine   [n/a ] if  previously on HDA - off pressors x 24h with stable neuro exam    [X] no new symptoms x 24h (i.e. new fever, new-onset nausea/vomiting)  [X] stable labs: (i.e. WBC not rising, sodium not dropping)  [X] patient not at high risk for aspiration, if high risk then:                  [ ] should have definitive plans for trach/PEG (alternative option is to discharge from ICU to facilty)                  [ ] stepdown to bed close to nurse’s station  [n/a] low suctioning requirements (i.e. q4h or less)  [X] sign-off from primary RN* Shelby   [X] drains do not require ICU level of care  [X] if patient previously agitated or with behavioral issues – controlled  - has PRN seroquel ordered  [X] pain controlled   77y/F with  L parafalcine mass, meningioma, brain compression, cerebral edema, s/p bifrontal crani, resection (10/27/2022, Dr. Garnica)  Hypertension  h/o hyperthyroidism    PLAN:   NEURO: neurochecks q2h, VSq2h, PRN pain meds with acetaminophen  continue steroids, taper as per neurosurgery, f/u final histopathology  seizure prophylaxis:  switch to brivaracetam 50 mg PO BID   delirium: decrease standing and PRN quetiapine to 25mg  REHAB:  physical therapy evaluation and management    EARLY MOB:  OOB to chair, ambulate     PULM:  room air, skull base precautions: no nose blowing, drinking with a straw, or bending below waist; NO incentive spirometry   CARDIO:  SBP goal 100-160mm Hg, continue lisinopril, metoprolol  ENDO:  Blood sugar goals 140-180 mg/dL  GI:  PPI for GI prophylaxis while on steroids  DIET: regular diet - add ensure (poor intake)  RENAL:  IVL  HEM/ONC: Hb stable   VTE Prophylaxis: SCDs, SQL; baseline dopp NEG  ID: afebrile, leukocytosis  Social: will update family     Active issues:  What's keeping patient in the ICU? close neuro checks  What is this patient's dispo plan? stepdown  if stable overnight    ATTENDING ATTESTATION:  I was physically present for the key portions of the evaluation and management (E/M) service provided.  I agree with the above history, physical and plan, which I have reviewed and edited where appropriate.    Patient at high risk for neurological deterioration or death due to:  ICU delirium, aspiration PNA, DVT / PE.  Critical care time:  I have personally provided 45 minutes of critical care time, excluding time spent on separate procedures.      Plan discussed with RN, house staff.  ICU stepdown Checklist:    Completed: 10-29 @ 09:41    [X] hemodynamically stable – VS WNL and stable x 24hours, UO adequate - hypotension is sedation-related, now off Dexmedetomidine   [n/a ] if  previously on HDA - off pressors x 24h with stable neuro exam    [X] no new symptoms x 24h (i.e. new fever, new-onset nausea/vomiting)  [X] stable labs: (i.e. WBC not rising, sodium not dropping)  [X] patient not at high risk for aspiration, if high risk then:                  [ ] should have definitive plans for trach/PEG (alternative option is to discharge from ICU to facilty)                  [ ] stepdown to bed close to nurse’s station  [n/a] low suctioning requirements (i.e. q4h or less)  [X] sign-off from primary RN* Shelby   [X] drains do not require ICU level of care  [X] if patient previously agitated or with behavioral issues – controlled  - has PRN seroquel ordered  [X] pain controlled

## 2022-11-01 NOTE — BH CONSULTATION LIAISON PROGRESS NOTE - NSBHASSESSMENTFT_PSY_ALL_CORE
Ms. Jennings is a 77 year old female with pmh HTN, Hyperthyroidism, with no PPH who lives in [place] originally presented with left frontal mass, likely meningioma. Per son, meningioma was diagnosed upon CT head performed for memory loss and confusion. Son reports that confusion has been ongoing for several months. Patient admits to mild headache but denies diplopia, blurry vision, nausea, vomiting, extremity numbness or weakness and slurred speech. Patient is preop for bifrontal craniotomy for meningioma resection 10/27. Psychiatry consulted due to escalated agitation and request for medication recommendations for acute agitation.   On assessment,  pt is confused and slightly sedated; a change from her status on 10/31 (agitated/aggressive). Pt had one episode of agitation this morning, as per nursing staff. Pt is only alert to person. Pts change in baseline is concerning for a metabolic encephalopathy (delirium) in the setting of bifrontal craniotomy. It is also possible that pts change in mental status is a response to tx with steroids, pt might benefit from tapering steroids more quickly.       on second EKG 10/31.  Medication recommendations:   1)Change Zyprexa 10mg to Zyprexa 5mg IM/PO PRN q 8 hours for acute agitation  2)Continue Seroquel 100 mg PO q12 hours PRN  3) Continue melatonin 10mg PO; pt should receive at bedtime     4) Avoid benzodiazepines and anticholinergic agents these can worsen agitation  5) Continue to work pt up for an underlying medical cause of delirium, r/o UTI as cause of delirium      Ms. Jennings is a 77 year old female with pmh HTN, Hyperthyroidism, with no PPH who lives in [place] originally presented with left frontal mass, likely meningioma. Per son, meningioma was diagnosed upon CT head performed for memory loss and confusion. Son reports that confusion has been ongoing for several months. Patient admits to mild headache but denies diplopia, blurry vision, nausea, vomiting, extremity numbness or weakness and slurred speech. Patient is preop for bifrontal craniotomy for meningioma resection 10/27. Psychiatry consulted due to escalated agitation and request for medication recommendations for acute agitation.   On assessment,  pt is confused and slightly sedated; a change from her status on 10/31 (agitated/aggressive). Pt had one episode of agitation this morning, as per nursing staff. Pt is only alert to person. Pts change in baseline is concerning for a metabolic encephalopathy (delirium) in the setting of bifrontal craniotomy. It is also possible that pts change in mental status is a response to tx with steroids, pt might benefit from tapering steroids more quickly.       on second EKG 10/31.  Medication recommendations:   1)Change Zyprexa 10mg to Zyprexa 5mg IM/PO PRN q 8 hours for acute agitation  2)Continue Seroquel 100 mg PO q12 hours   3) Continue melatonin 10mg PO; pt should receive at bedtime     4) Avoid benzodiazepines and anticholinergic agents these can worsen agitation  5) Continue to work pt up for an underlying medical cause of delirium, r/o UTI as cause of delirium   6) Supervised room per nursing protocol      Ms. Jennings is a 77 year old female with pmh HTN, Hyperthyroidism, with no PPH who lives in [place] originally presented with left frontal mass, likely meningioma. Per son, meningioma was diagnosed upon CT head performed for memory loss and confusion. Son reports that confusion has been ongoing for several months. Patient admits to mild headache but denies diplopia, blurry vision, nausea, vomiting, extremity numbness or weakness and slurred speech. Patient is preop for bifrontal craniotomy for meningioma resection 10/27. Psychiatry consulted due to escalated agitation and request for medication recommendations for acute agitation.   On assessment,  pt is confused and slightly sedated; a change from her status on 10/31 (agitated/aggressive). Pt had one episode of agitation this morning, as per nursing staff. Pt is only alert to person. Pts change in baseline is concerning for a metabolic encephalopathy (delirium) in the setting of bifrontal craniotomy. It is also possible that pts change in mental status is a response to tx with steroids, pt might benefit from tapering steroids more quickly.       on second EKG 10/31.  Medication recommendations:   1)Change Zyprexa 10mg to Zyprexa 5mg IM/PO PRN q 8 hours for acute agitation  2)Start Standing Quetiapine 50mg po q12  3) Continue melatonin 10mg PO; pt should receive at bedtime     4) Avoid benzodiazepines and anticholinergic agents these can worsen agitation  5) Continue to work pt up for an underlying medical cause of delirium, r/o UTI as cause of delirium   6) Supervised room per nursing protocol

## 2022-11-01 NOTE — PROGRESS NOTE ADULT - SUBJECTIVE AND OBJECTIVE BOX
HPI:  77 year old female with pmh HTN who lives in [place] presents with left frontal mass, likely meningioma. Per son, meningioma was diagnosed upon CT head performed for memory loss and confusion. Son reports that confusion has been ongoing for several months. Patient admits to mild headache but denies diplopia, blurry vision, nausea, vomiting, extremity numbness or weakness and slurred speech. Patient is preop for bifrontal craniotomy for meningioma resection 10/27.   (26 Oct 2022 10:58)    OVERNIGHT EVENTS:  patient agitated overnight attempting to leave hospital, security at bedside. ordered 1:1 supervision. psych consulted, recc 10mg melatonin qd, seroquel 100mg BID and zyprexa up to 30mg/day.  patient on restraints: b/l wrist/mittens, and soft vest.         HOSPITAL COURSE:  10/27: POD# 0 S/P bifrontal craniotomy for removal of skull base mass (frozen: meningioma.) Postop, Pt reports mild headache otherwise neurologically stable. Waxing/waning neuro exam. CTH stable, but with significant edema. Na 143, given 250cc 3% bolus.   10/28: POD1. CTH overnight stable. Oxycodone dc'd due to intermittent lethargy. He removed pending TOV. Failed TOV, straight cath prn.   10/29: POD2. Neuro stable. JPx2. Agitated overnight, precedex started transiently. SBP drop, precedex dc'd and 1L bolus given. Started on seroquel 12.5 at bedtime prn.  10/30: POD3, given additional 12.5 mg seroquel and placed on wrist restraints overnight for agitation. Wrist restraints removed. JUN # 2 removed. Metoprolol increased to 50 BID. Iron studies ordered for microcytic anemia.    10/31: POD4 BALJINDER overnight   11/1: POD5. patient agitated overnight attemtping to leave hospital, security at bedside. ordered 1:1 supervision. psych consulted, recc 10mg melatonin qd, seroquel 100mg BID and zyprexa up to 30mg/day. patient on restraints: b/l wrist/mittens, and soft vest.     Vital Signs Last 24 Hrs  T(C): 36.2 (31 Oct 2022 20:24), Max: 37.1 (31 Oct 2022 09:37)  T(F): 97.2 (31 Oct 2022 20:24), Max: 98.7 (31 Oct 2022 09:37)  HR: 86 (31 Oct 2022 20:24) (68 - 112)  BP: 157/99 (31 Oct 2022 20:24) (122/74 - 161/85)  BP(mean): 118 (31 Oct 2022 20:24) (118 - 118)  RR: 18 (31 Oct 2022 20:24) (16 - 18)  SpO2: 95% (31 Oct 2022 20:24) (95% - 100%)    Parameters below as of 31 Oct 2022 20:24  Patient On (Oxygen Delivery Method): room air        I&O's Summary    30 Oct 2022 07:01  -  31 Oct 2022 07:00  --------------------------------------------------------  IN: 0 mL / OUT: 25 mL / NET: -25 mL        PHYSICAL EXAM:  General: NAD, pt is comfortably sitting up in bed, on room air  HEENT: EOMI b/l, face symmetric, tongue midline, neck FROM  Cardiovascular: Regular rate and rhythm  Respiratory: nonlabored breathing, normal chest rise  GI: abdomen soft, nontender, nondistended  Neuro: CN II-XII grossly intact, PERRL 3mm briskly reactive   A&Ox1 to name, SLADE x4 spontaneously, 5/5 strength in all extremities throughout.   Extremities: distal pulses 2+ x4  Wound/incision: brifrontal crani staples c/d/i    TUBES/LINES:  [] He  [] Wound Drains  [] Others      DIET:  [] NPO  [x] Mechanical  [] Tube feeds    LABS:                        9.6    9.86  )-----------( 166      ( 31 Oct 2022 06:37 )             29.1     10-31    147<H>  |  113<H>  |  18  ----------------------------<  108<H>  4.1   |  24  |  0.74    Ca    8.9      31 Oct 2022 06:37  Phos  3.3     10-31  Mg     1.9     10-31              CAPILLARY BLOOD GLUCOSE          Drug Levels: [] N/A    CSF Analysis: [] N/A      Allergies    No Known Drug Allergies  strawberry (Hives)    Intolerances      MEDICATIONS:  Antibiotics:  amoxicillin  500 milliGRAM(s)/clavulanate 1 Tablet(s) Oral every 12 hours    Neuro:  acetaminophen     Tablet .. 650 milliGRAM(s) Oral every 6 hours PRN  levETIRAcetam 500 milliGRAM(s) Oral two times a day  melatonin 10 milliGRAM(s) Oral at bedtime  QUEtiapine 100 milliGRAM(s) Oral every 12 hours    Anticoagulation:  enoxaparin Injectable 40 milliGRAM(s) SubCutaneous every 24 hours    OTHER:  bisacodyl 5 milliGRAM(s) Oral daily PRN  dexAMETHasone     Tablet   Oral   dexAMETHasone     Tablet 4 milliGRAM(s) Oral every 8 hours  dexAMETHasone     Tablet 2 milliGRAM(s) Oral every 8 hours  hydrALAZINE Injectable 10 milliGRAM(s) IV Push every 2 hours PRN  lisinopril 10 milliGRAM(s) Oral daily  metoprolol tartrate 50 milliGRAM(s) Oral two times a day  pantoprazole    Tablet 40 milliGRAM(s) Oral before breakfast  senna 2 Tablet(s) Oral at bedtime    IVF:    CULTURES:    RADIOLOGY & ADDITIONAL TESTS:    < from: MR Head w/wo IV Cont (10.30.22 @ 14:40) >  IMPRESSION: Patient status post bifrontal craniotomy and frontal sinus   obliteration procedure for gross total resection of meningioma along the   floor of the left anterior cranial fossa.      ASSESSMENT:  77 year old female with pmh HTN found to have left frontal brain mass, likely meningioma, on workup for confusion and memory loss. Patient is now s/p bifrontal craniotomy for meningioma resection (frozen: meningioma) 10/27/22      PLAN:    Neuro:  - neuro checks/vital signs q4hes   - pain control PRN  - keppra 500mg BID for seizure ppx  - decadron 4q6 x3d then taper over 1 week  - postop MRI brain done 10/30  - subgaleal #1JP, removed 10/31  - JUN #2 removed 10/30  - CTH 10/27 - stable, significant edema  - agitation: zyprexa PRN, seroquel 100mg BID, melatonin 10mg qhs     Cardiac:  - -140  - cont home lisinopril 10mg qd   - Increased home metoprolol to 50mg BID 10/30    Pulmonary:  - on RA, IS    GI:  - Regular diet  - bowel regimen  - PPI while on steroids    Renal:  - IVL, voiding  - straight cath PRN     Heme:  - SCDs/SQL for DVT ppx  - LE dopplers 10/26: neg for DVT  - Microcytic anemia: f/u iron studies     Endo:  - A1C 5.1, no issues    ID:  - afebrile, trend leukocytosis  - augmentin x 10 days (10/31-11/10) per Roseline     Disposition: SDU status, full code, PT/OT recs AR, Family updated with plan    Assessment and Plan d/w Dr. Garnica

## 2022-11-01 NOTE — PROGRESS NOTE ADULT - SUBJECTIVE AND OBJECTIVE BOX
=================================  NEUROCRITICAL CARE ATTENDING NOTE  =================================    VICKIE JARRETT   MRN-[id number]  Summary:  77y/F with pmh HTN who lives in [place] presents with left frontal mass, likely meningioma. Per son, meningioma was diagnosed upon CT head performed for memory loss and confusion. Son reports that confusion has been ongoing for several months. Patient admits to mild headache but denies diplopia, blurry vision, nausea, vomiting, extremity numbness or weakness and slurred speech. Patient is preop for bifrontal craniotomy for meningioma resection 10/27.  (26 Oct 2022 10:58)    COURSE IN THE HOSPITAL:  10/26 admitted to [facility]  10/27 s/p OR, slow to wake-up, post-op CT no significant findings  10/28 overnight -- nonverbal, not FC intermittently, given 250cc 3% bolus;  10/29 No significant events overnight; Dexmedetomidine started overnight SBP soft and given fluids, resolved after sedation held    Past Medical History: Meningioma Hypertension H/O hyperthyroidism   Allergies:  No Known Drug Allergies strawberry (Hives)  Home meds:   ·	aspirin 81 mg oral tablet, chewable: 0.5 tab(s) orally once a day, As Needed  ·	(last taken over 1 week ago)  ·	lisinopril 10 mg oral tablet: 1 tab(s) orally once a day  ·	metoprolol succinate 25 mg oral tablet, extended release: 1 tab(s) orally 2 times a day    PHYSICAL EXAMINATION  T(C): 36.3 (10-29 @ 05:30), Max: 37.5 (10-28 @ 21:25) HR: 73 (10-29 @ 07:00) (56 - 98) BP: 151/86 (10-29 @ 07:00) (85/48 - 156/86) RR: 16 (10-29 @ 05:00) (14 - 22) SpO2: 95% (10-29 @ 05:00) (93% - 99%)   NEUROLOGIC EXAMINATION:  Patient is AOx2, confused, TESSIE, moves all 4s; c/o BOV   GENERAL: not intubated, not in cardiorespiratory distress  EENT:  anicteric  CARDIOVASCULAR: (+) S1 S2, normal rate and regular rhythm  PULMONARY: clear to auscultation bilaterally  ABDOMEN: soft, nontender with normoactive bowel sounds  EXTREMITIES: no edema  SKIN: no rash    LABS: 10-29  CAPILLARY BLOOD GLUCOSE 109 116 114 105     (15.68)  9.9  (9.8)  13.42 )-----------( 171      ( 29 Oct 2022 05:50 )             30.7     145  |  114<H>  |  15  ----------------------------<  109<H>  4.2   |  22  |  0.75    Ca    8.8      29 Oct 2022 05:50  Phos  3.3     10-29  Mg     1.9     10-29    TPro  5.9<L>  /  Alb  3.4  /  TBili  <0.2  /  DBili  x   /  AST  16  /  ALT  7<L>  /  AlkPhos  65  10-27    10-28 @ 07:01  -  10-29 @ 07:00  IN: 2405 mL / OUT: 580 mL / NET: 1825 mL     Bacteriology:  CSF studies:  EEG:  Neuroimaging:  Other imaging:    MEDICATIONS: 10-29    ·	enoxaparin Injectable 40 SubCutaneous every 24 hours  ·	ceFAZolin   IVPB 2000 IV Intermittent every 8 hours  ·	levETIRAcetam 500 Oral two times a day  ·	lisinopril 10 milliGRAM(s) Oral daily  ·	metoprolol tartrate 25 milliGRAM(s) Oral every 12 hours  ·	pantoprazole    Tablet 40 Oral before breakfast  ·	senna 2 Oral at bedtime  ·	dexAMETHasone  Injectable 4 IV Push every 6 hours  ·	insulin lispro (ADMELOG) corrective regimen sliding scale  SubCutaneous Before meals and at bedtime  ·	acetaminophen     Tablet .. 650 Oral every 6 hours PRN  ·	bisacodyl 5 Oral daily PRN  ·	hydrALAZINE Injectable 10 IV Push every 2 hours PRN  ·	ketorolac   Injectable 15 IV Push every 6 hours PRN  ·	QUEtiapine 12.5 Oral at bedtime PRN    IV FLUIDS: NS@50cc/hr  DRIPS:  DIET: full liquid  Lines: [place]   Drains:    ·	SGx2   Wounds:    CODE STATUS:  Full Code                       GOALS OF CARE:  aggressive                      DISPOSITION:  ICU =================================  NEUROCRITICAL CARE ATTENDING NOTE  =================================    VICKIE JARRETT   MRN-[id number]  Summary:  77y/F with pmh HTN who lives in [place] presents with left frontal mass, likely meningioma. Per son, meningioma was diagnosed upon CT head performed for memory loss and confusion. Son reports that confusion has been ongoing for several months. Patient admits to mild headache but denies diplopia, blurry vision, nausea, vomiting, extremity numbness or weakness and slurred speech. Patient is preop for bifrontal craniotomy for meningioma resection 10/27.  (26 Oct 2022 10:58)    COURSE IN THE HOSPITAL:  10/26 admitted to [facility]  10/27 s/p OR, slow to wake-up, post-op CT no significant findings  10/28 overnight -- nonverbal, not FC intermittently, given 250cc 3% bolus;  10/29 No significant events overnight; Dexmedetomidine started overnight SBP soft and given fluids, resolved after sedation held  11/01 ovenright agitated, wanted to leave, 1:1; increased confusion, waxing and waning status during the day - CT stable, transferred to ICU for closer monitoring    Past Medical History: Meningioma Hypertension H/O hyperthyroidism   Allergies:  No Known Drug Allergies strawberry (Hives)  Home meds:   ·	aspirin 81 mg oral tablet, chewable: 0.5 tab(s) orally once a day, As Needed  ·	(last taken over 1 week ago)  ·	lisinopril 10 mg oral tablet: 1 tab(s) orally once a day  ·	metoprolol succinate 25 mg oral tablet, extended release: 1 tab(s) orally 2 times a day    PHYSICAL EXAMINATION  T(C): 36.9 (11-01 @ 19:05), Max: 36.9 (11-01 @ 19:05) HR: 67 (11-01 @ 21:33) (60 - 85) BP: 153/85 (11-01 @ 21:33) (113/75 - 189/98) RR: 16 (11-01 @ 21:33) (16 - 18) SpO2: 98% (11-01 @ 21:33) (96% - 99%)  NEUROLOGIC EXAMINATION:  Patient is AOx2, confused, TESSIE, moves all 4s; c/o BOV ; L pupil 3mm brisk R 3mm sluggish  GENERAL: not intubated, not in cardiorespiratory distress  EENT:  anicteric  CARDIOVASCULAR: (+) S1 S2, normal rate and regular rhythm  PULMONARY: clear to auscultation bilaterally  ABDOMEN: soft, nontender with normoactive bowel sounds  EXTREMITIES: no edema  SKIN: no rash    LABS: 11-01  CAPILLARY BLOOD GLUCOSE 189     (9.82)  10.8 (10.5)  11.69 )-----------( 201      ( 01 Nov 2022 17:32 )             33.5   (146)  146<H>  |  110<H>  |  18  ----------------------------<  110<H>  4.2   |  25  |  0.65    Ca    9.3      01 Nov 2022 17:32  Phos  4.0     11-01  Mg     2.1     11-01    10-31 @ 07:01  -  11-01 @ 07:00  IN: 600 mL / OUT: 1100 mL / NET: -500 mL    Bacteriology:  CSF studies:  EEG:  Neuroimaging:  Other imaging:    MEDICATIONS: 11-01    ·	enoxaparin Injectable 40 SubCutaneous every 24 hours  ·	amoxicillin  500 milliGRAM(s)/clavulanate 1 Oral every 12 hours  ·	levETIRAcetam 500 Oral two times a day  ·	melatonin 10 Oral at bedtime  ·	QUEtiapine 50 Oral at bedtime  ·	lisinopril 10 milliGRAM(s) Oral daily  ·	metoprolol tartrate 50 milliGRAM(s) Oral two times a day  ·	pantoprazole    Tablet 40 Oral before breakfast  ·	senna 2 Oral at bedtime  ·	dexAMETHasone     Tablet 2 Oral every 8 hours  ·	benzocaine 15 mG/menthol 3.6 mG Lozenge 1 Oral daily  ·	acetaminophen     Tablet .. 650 Oral every 6 hours PRN  ·	bisacodyl 5 Oral daily PRN  ·	hydrALAZINE Injectable 10 IV Push every 2 hours PRN  ·	QUEtiapine 50 Oral <User Schedule> PRN    IV FLUIDS: IVL  DRIPS:  DIET: regular   Lines:   Drains:    Wounds:    CODE STATUS:  Full Code                       GOALS OF CARE:  aggressive                      DISPOSITION:  ICU

## 2022-11-01 NOTE — PROGRESS NOTE ADULT - ASSESSMENT
78 yo F hx of HTN, Hyperthyroidism who presented w/ likely L frontal meningioma and is s/p bifrontal craniotomy with meningioma resection 10/27    #Meningioma  Post op course complicated by agitation. Was briefly on Precedex drip. Now calm  - c/w steroids, PPI and SSI  - Seizure prophylaxis per NSGY  - Rest of care per primary team   - JUN drains removed  - repeat CT head today    #Agitation   - Calm this AM, remains confused confusion   - During periods of agitation, would redirect  - Please avoid restraints when able  - Seroquel 50mg per psych and PRN Zyprexa, would consider dosing Seroquel at bedtime for agitation as patient seems to have worse agitation at night  - lights on during day, off at night. Redirection PRN    #Hypernatremia  - likely due to poor PO intake, Na 146 today from 147  - encouraged increased water intake, will monitor    #Anemia  Baseline Hb 11.2--> 9  - Likely some component of blood loss anemia  - iron panel consistent with inflammatory anemia    #Leukocytosis  Resolved  - No signs of active infection. Currently on post op abx prophylaxis   - Likely from steroids    # HTN   Home meds: Toprol 25mg BID and lisinopril 10mg   - BP elevated today possibly in setting of agitation, can increase Lopressor 50mg BID to 75mg BID if remains > 160  - c/w lisinopril 10mg     DVT ppx: Lovenox, SCDs    Dispo: PT--> AR

## 2022-11-01 NOTE — CHART NOTE - NSCHARTNOTEFT_GEN_A_CORE
Admitting Diagnosis:   Patient is a 77y old  Female who presents with a chief complaint of meningioma (2022 13:48)    PAST MEDICAL & SURGICAL HISTORY:  Meningioma    Hypertension    H/O hyperthyroidism  s/p thyroid surgery, no longer on medication    Status post thyroid surgery    Current Nutrition Order:  regular + Ensure Enlive TID (1050kcal/60gpro)     PO Intake: Good (%) [   ]  Fair (50-75%) [   ] Poor (<25%) [ x ]    GI Issues:   No N/V/D/C    Pain:  No pain noted    Skin Integrity:  +surgical incision    Labs:       146<H>  |  111<H>  |  19  ----------------------------<  114<H>  4.0   |  25  |  0.69    Ca    9.0      2022 05:30  Phos  3.6     11-  Mg     1.9     11-    CAPILLARY BLOOD GLUCOSE    Medications:  MEDICATIONS  (STANDING):  amoxicillin  500 milliGRAM(s)/clavulanate 1 Tablet(s) Oral every 12 hours  benzocaine 15 mG/menthol 3.6 mG Lozenge 1 Lozenge Oral daily  dexAMETHasone     Tablet   Oral   dexAMETHasone     Tablet 2 milliGRAM(s) Oral every 8 hours  enoxaparin Injectable 40 milliGRAM(s) SubCutaneous every 24 hours  levETIRAcetam 500 milliGRAM(s) Oral two times a day  lisinopril 10 milliGRAM(s) Oral daily  melatonin 10 milliGRAM(s) Oral at bedtime  metoprolol tartrate 50 milliGRAM(s) Oral two times a day  pantoprazole    Tablet 40 milliGRAM(s) Oral before breakfast  QUEtiapine 100 milliGRAM(s) Oral at bedtime  senna 2 Tablet(s) Oral at bedtime    MEDICATIONS  (PRN):  acetaminophen     Tablet .. 650 milliGRAM(s) Oral every 6 hours PRN Mild Pain (1 - 3)  bisacodyl 5 milliGRAM(s) Oral daily PRN Constipation  hydrALAZINE Injectable 10 milliGRAM(s) IV Push every 2 hours PRN SBP>160  QUEtiapine 100 milliGRAM(s) Oral <User Schedule> PRN agitation    Height for BMI (FEET)	5 Feet  Height for BMI (INCHES)	8 Inch(s)  Height for BMI (CENTIMETERS)	172.72 Centimeter(s)  Weight for BMI (lbs)	170 lb  Weight for BMI (kg)	77.1 kg  Body Mass Index	25.8    Weight Change: No new wts    Estimated energy needs:   IBW used for calculations as pt >120% of IBW (121%), adjusted for wound healing, age  20-25kcal/k-1587kcal  1.2-1.4g/k-89gprotein  30-35ml/k-2222ml fluid    Subjective:   77 year old female with pmh HTN found to have left frontal brain mass, likely meningioma, on workup for confusion and memory loss. Patient is now s/p bifrontal craniotomy for meningioma resection (frozen: meningioma) 10/27/22    Pt seen in room for nutrition assessment. Daughter at bedside. Pt diet advanced to regular 10/29 + Ensure Enlive TID (1050kcal/60gpro) to supplement intake. Pt tolerating PO, intake has been poor mainly d/t pt sleeping often throughout day per daughter. Consuming ~25-50% of meals. Pt likes salmon, chicken, mashed potatoes and Ensure. Drinking ~50% Ensure today. RD to continue to encourage PO intake and honor food preferences. Please see full nutrition recommendations below. Will continue to follow per RD protocol.     Previous Nutrition Diagnosis: Increased nutrient needs RT post-op nutrition optimization AEB s/p bifrontal craniotomy for meningioma resection     Active [x  ]  Resolved [   ]    If resolved, new PES:     Goal: Pt to meet >75% estimated nutrition needs consistently.     Recommendations:  1. Continue regular diet + Ensure Enlive TID (1050kcal/60gpro)   2. Optimize meal time when pt awake and hungry  3. Provide assistance with meals prn  4. Monitor lytes and replete  5. RD to remain available prn     Education: Encouraged PO intake     Risk Level: High [   ] Moderate [ x ] Low [   ]

## 2022-11-01 NOTE — BH CONSULTATION LIAISON PROGRESS NOTE - NSBHCONSULTMEDAGITATION_PSY_A_CORE FT
1)Change Zyprexa 10mg to Zyprexa 5mg IM/PO PRN q 8 hours for acute agitation  2)Continue Seroquel 100 mg PO q12 hours PRN      1)Change Zyprexa 10mg to Zyprexa 5mg IM/PO PRN q 8 hours for acute agitation  2)Continue Seroquel 100 mg PO q12 hours    1)Change Zyprexa 10mg to Zyprexa 5mg IM/PO PRN q 8 hours for acute agitation  2)Continue Seroquel 100 mg PO q12 hours PRN
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
## 2022-11-02 LAB
ANION GAP SERPL CALC-SCNC: 8 MMOL/L — SIGNIFICANT CHANGE UP (ref 5–17)
BUN SERPL-MCNC: 19 MG/DL — SIGNIFICANT CHANGE UP (ref 7–23)
CALCIUM SERPL-MCNC: 8.9 MG/DL — SIGNIFICANT CHANGE UP (ref 8.4–10.5)
CHLORIDE SERPL-SCNC: 110 MMOL/L — HIGH (ref 96–108)
CO2 SERPL-SCNC: 28 MMOL/L — SIGNIFICANT CHANGE UP (ref 22–31)
CREAT SERPL-MCNC: 0.65 MG/DL — SIGNIFICANT CHANGE UP (ref 0.5–1.3)
EGFR: 92 ML/MIN/1.73M2 — SIGNIFICANT CHANGE UP
GLUCOSE SERPL-MCNC: 95 MG/DL — SIGNIFICANT CHANGE UP (ref 70–99)
HCT VFR BLD CALC: 30.6 % — LOW (ref 34.5–45)
HGB BLD-MCNC: 10.1 G/DL — LOW (ref 11.5–15.5)
MAGNESIUM SERPL-MCNC: 2 MG/DL — SIGNIFICANT CHANGE UP (ref 1.6–2.6)
MCHC RBC-ENTMCNC: 22.7 PG — LOW (ref 27–34)
MCHC RBC-ENTMCNC: 33 GM/DL — SIGNIFICANT CHANGE UP (ref 32–36)
MCV RBC AUTO: 68.9 FL — LOW (ref 80–100)
NRBC # BLD: 0 /100 WBCS — SIGNIFICANT CHANGE UP (ref 0–0)
PHOSPHATE SERPL-MCNC: 4.2 MG/DL — SIGNIFICANT CHANGE UP (ref 2.5–4.5)
PLATELET # BLD AUTO: 187 K/UL — SIGNIFICANT CHANGE UP (ref 150–400)
POTASSIUM SERPL-MCNC: 4.2 MMOL/L — SIGNIFICANT CHANGE UP (ref 3.5–5.3)
POTASSIUM SERPL-SCNC: 4.2 MMOL/L — SIGNIFICANT CHANGE UP (ref 3.5–5.3)
RBC # BLD: 4.44 M/UL — SIGNIFICANT CHANGE UP (ref 3.8–5.2)
RBC # FLD: 16 % — HIGH (ref 10.3–14.5)
SARS-COV-2 RNA SPEC QL NAA+PROBE: SIGNIFICANT CHANGE UP
SODIUM SERPL-SCNC: 146 MMOL/L — HIGH (ref 135–145)
WBC # BLD: 9.66 K/UL — SIGNIFICANT CHANGE UP (ref 3.8–10.5)
WBC # FLD AUTO: 9.66 K/UL — SIGNIFICANT CHANGE UP (ref 3.8–10.5)

## 2022-11-02 PROCEDURE — 99231 SBSQ HOSP IP/OBS SF/LOW 25: CPT

## 2022-11-02 PROCEDURE — 70450 CT HEAD/BRAIN W/O DYE: CPT | Mod: 26

## 2022-11-02 RX ORDER — CHLORHEXIDINE GLUCONATE 213 G/1000ML
1 SOLUTION TOPICAL
Refills: 0 | Status: DISCONTINUED | OUTPATIENT
Start: 2022-11-02 | End: 2022-11-04

## 2022-11-02 RX ORDER — ENOXAPARIN SODIUM 100 MG/ML
40 INJECTION SUBCUTANEOUS
Refills: 0 | Status: ACTIVE | OUTPATIENT
Start: 2022-11-02 | End: 2023-10-01

## 2022-11-02 RX ORDER — POLYETHYLENE GLYCOL 3350 17 G/17G
17 POWDER, FOR SOLUTION ORAL
Refills: 0 | Status: DISCONTINUED | OUTPATIENT
Start: 2022-11-02 | End: 2022-11-02

## 2022-11-02 RX ORDER — POLYETHYLENE GLYCOL 3350 17 G/17G
17 POWDER, FOR SOLUTION ORAL
Refills: 0 | Status: DISCONTINUED | OUTPATIENT
Start: 2022-11-02 | End: 2022-11-14

## 2022-11-02 RX ADMIN — LISINOPRIL 10 MILLIGRAM(S): 2.5 TABLET ORAL at 06:11

## 2022-11-02 RX ADMIN — Medication 2 MILLIGRAM(S): at 06:12

## 2022-11-02 RX ADMIN — Medication 2 MILLIGRAM(S): at 15:52

## 2022-11-02 RX ADMIN — ENOXAPARIN SODIUM 40 MILLIGRAM(S): 100 INJECTION SUBCUTANEOUS at 22:03

## 2022-11-02 RX ADMIN — Medication 1 TABLET(S): at 07:04

## 2022-11-02 RX ADMIN — Medication 10 MILLIGRAM(S): at 22:02

## 2022-11-02 RX ADMIN — BRIVARACETAM 50 MILLIGRAM(S): 25 TABLET, FILM COATED ORAL at 19:52

## 2022-11-02 RX ADMIN — BRIVARACETAM 50 MILLIGRAM(S): 25 TABLET, FILM COATED ORAL at 06:11

## 2022-11-02 RX ADMIN — Medication 5 MILLIGRAM(S): at 07:04

## 2022-11-02 RX ADMIN — Medication 2 MILLIGRAM(S): at 22:01

## 2022-11-02 RX ADMIN — SENNA PLUS 2 TABLET(S): 8.6 TABLET ORAL at 22:03

## 2022-11-02 RX ADMIN — CHLORHEXIDINE GLUCONATE 1 APPLICATION(S): 213 SOLUTION TOPICAL at 06:45

## 2022-11-02 RX ADMIN — QUETIAPINE FUMARATE 25 MILLIGRAM(S): 200 TABLET, FILM COATED ORAL at 22:03

## 2022-11-02 RX ADMIN — Medication 1 TABLET(S): at 19:53

## 2022-11-02 RX ADMIN — PANTOPRAZOLE SODIUM 40 MILLIGRAM(S): 20 TABLET, DELAYED RELEASE ORAL at 06:11

## 2022-11-02 RX ADMIN — BENZOCAINE AND MENTHOL 1 LOZENGE: 5; 1 LIQUID ORAL at 12:08

## 2022-11-02 RX ADMIN — POLYETHYLENE GLYCOL 3350 17 GRAM(S): 17 POWDER, FOR SOLUTION ORAL at 19:36

## 2022-11-02 RX ADMIN — POLYETHYLENE GLYCOL 3350 17 GRAM(S): 17 POWDER, FOR SOLUTION ORAL at 12:08

## 2022-11-02 NOTE — PROGRESS NOTE ADULT - SUBJECTIVE AND OBJECTIVE BOX
S/Overnight events:  no acute events overnight, switched keppra to Briviat for agitation       Hospital Course:   10/27: POD# 0 S/P bifrontal craniotomy for removal of skull base mass (frozen: meningioma.) Postop, Pt reports mild headache otherwise neurologically stable. Waxing/waning neuro exam. CTH stable, but with significant edema. Na 143, given 250cc 3% bolus.   10/28: POD1. CTH overnight stable. Oxycodone dc'd due to intermittent lethargy. He removed pending TOV. Failed TOV, straight cath prn.   10/29: POD2. Neuro stable. JPx2. Agitated overnight, precedex started transiently. SBP drop, precedex dc'd and 1L bolus given. Started on seroquel 12.5 at bedtime prn.  10/30: POD3, given additional 12.5 mg seroquel and placed on wrist restraints overnight for agitation. Wrist restraints removed. JUN # 2 removed. Metoprolol increased to 50 BID. Iron studies ordered for microcytic anemia.    10/31: POD4 BALJINDER overnight   11: POD5. patient agitated overnight attemtping to leave hospital, security at bedside. ordered 1:1 supervision. psych consulted, recc 10mg melatonin qd, seroquel 100mg BID and zyprexa up to 30mg/day. patient on restraints: b/l wrist/mittens, and soft vest./98 this morning hydral given, pending nutrition consult, upgraded to ICU for decreased level of arousal   : POD 6 mening resection, ICU overnight, mental status improved but not fully back to post op baseline per son         Vital Signs Last 24 Hrs  T(C): 36.9 (2022 19:05), Max: 36.9 (2022 19:05)  T(F): 98.5 (2022 19:05), Max: 98.5 (2022 19:05)  HR: 62 (2022 01:00) (60 - 85)  BP: 113/63 (2022 01:00) (113/63 - 189/98)  BP(mean): 81 (2022 01:00) (81 - 122)  RR: 18 (2022 01:00) (16 - 19)  SpO2: 98% (2022 01:00) (96% - 99%)    Parameters below as of 2022 01:00  Patient On (Oxygen Delivery Method): room air        I&O's Detail    31 Oct 2022 07:01  -  2022 07:00  --------------------------------------------------------  IN:    Oral Fluid: 600 mL  Total IN: 600 mL    OUT:    Intermittent Catheterization - Urethral (mL): 1100 mL  Total OUT: 1100 mL    Total NET: -500 mL      2022 07:01  -  2022 01:15  --------------------------------------------------------  IN:  Total IN: 0 mL    OUT:    Intermittent Catheterization - Urethral (mL): 800 mL    Voided (mL): 400 mL  Total OUT: 1200 mL    Total NET: -1200 mL        I&O's Summary    31 Oct 2022 07:01  -  2022 07:00  --------------------------------------------------------  IN: 600 mL / OUT: 1100 mL / NET: -500 mL    2022 07:01  -  2022 01:15  --------------------------------------------------------  IN: 0 mL / OUT: 1200 mL / NET: -1200 mL      General: NAD, pt is comfortably sitting up in bed, on room air  HEENT: EOMI b/l, face symmetric, tongue midline, neck FROM  Cardiovascular: Regular rate and rhythm  Respiratory: nonlabored breathing, normal chest rise  GI: abdomen soft, nontender, nondistended  Neuro: CN II-XII grossly intact, PERRL 3mm, R pupil slightly more sluggish than L   A&Ox3, speech inappropriate at times, SLADE x4 spontaneously, 5/5 strength in all extremities throughout.   Extremities: distal pulses 2+ x4  Wound/incision: brifrontal crani deb c/d/i    LABS:                        10.8   11.69 )-----------( 201      ( 2022 17:32 )             33.5     11    146<H>  |  110<H>  |  18  ----------------------------<  110<H>  4.2   |  25  |  0.65    Ca    9.3      2022 17:32  Phos  4.0       Mg     2.1             Urinalysis Basic - ( 2022 16:40 )    Color: Yellow / Appearance: Clear / S.010 / pH: x  Gluc: x / Ketone: NEGATIVE  / Bili: Negative / Urobili: 0.2 E.U./dL   Blood: x / Protein: NEGATIVE mg/dL / Nitrite: NEGATIVE   Leuk Esterase: NEGATIVE / RBC: x / WBC x   Sq Epi: x / Non Sq Epi: x / Bacteria: x        CAPILLARY BLOOD GLUCOSE      POCT Blood Glucose.: 189 mg/dL (2022 16:09)        Allergies    No Known Drug Allergies  strawberry (Hives)    Intolerances      MEDICATIONS:  Antibiotics:  amoxicillin  500 milliGRAM(s)/clavulanate 1 Tablet(s) Oral every 12 hours    Neuro:  acetaminophen     Tablet .. 650 milliGRAM(s) Oral every 6 hours PRN  brivaracetam 50 milliGRAM(s) Oral every 12 hours  melatonin 10 milliGRAM(s) Oral at bedtime  QUEtiapine 25 milliGRAM(s) Oral at bedtime  QUEtiapine 25 milliGRAM(s) Oral <User Schedule> PRN    Anticoagulation:  enoxaparin Injectable 40 milliGRAM(s) SubCutaneous every 24 hours    OTHER:  benzocaine 15 mG/menthol 3.6 mG Lozenge 1 Lozenge Oral daily  bisacodyl 5 milliGRAM(s) Oral daily PRN  dexAMETHasone     Tablet   Oral   dexAMETHasone     Tablet 2 milliGRAM(s) Oral every 8 hours  hydrALAZINE Injectable 10 milliGRAM(s) IV Push every 2 hours PRN  lisinopril 10 milliGRAM(s) Oral daily  metoprolol tartrate 50 milliGRAM(s) Oral two times a day  pantoprazole    Tablet 40 milliGRAM(s) Oral before breakfast  senna 2 Tablet(s) Oral at bedtime    77 year old female with pmh HTN found to have left frontal brain mass, likely meningioma, on workup for confusion and memory loss. Patient is now s/p bifrontal craniotomy for meningioma resection (frozen: meningioma) 10/27/22    Plan:  Neuro:  - neuro checks/vital signs q2  - pain control PRN  - Bivirat 50 BID   - decadron 4q6 x3d then taper over 1 week  - postop MRI brain done 10/30  - subgaleal #1JP, removed 10/31  - JUN #2 removed 10/30  - CTH 10/27 and  - stable, significant edema  - agitation: zyprexa PRN, seroquel 25mg AM PRN, 25mg at bedtime, melatonin 10mg qhs     Cardiac:  - -140  - cont home lisinopril 10mg qd   - Increased home metoprolol to 50mg BID 10/30    Pulmonary:  - on RA, IS    GI:  - Regular diet  - bowel regimen  - PPI while on steroids    Renal:  - IVL, voiding  - straight cath PRN     Heme:  - SCDs/SQL for DVT ppx  - LE dopplers 10/26: neg for DVT  - Microcytic anemia: f/u iron studies     Endo:  - A1C 5.1, no issues    ID:  - afebrile, trend leukocytosis  - augmentin x 10 days (10/31-11/10) per Roseline     Disposition: SDU status, full code, PT/OT recs AR, Family updated with plan    Assessment and Plan d/w Dr. Garnica

## 2022-11-02 NOTE — BH CONSULTATION LIAISON PROGRESS NOTE - NSBHCONSULTMEDAGITATION_PSY_A_CORE FT
2) If PRN medication is needed for agitation: PRN Quetiapine 50mg Q6 or PRN Seroquel 50mg Q6. If pt is refusing PO medication, give Olanzapine 1M/PO 5mg Q6

## 2022-11-02 NOTE — BH CONSULTATION LIAISON PROGRESS NOTE - NSBHASSESSMENTFT_PSY_ALL_CORE
- Pt is a 77 year old female with pmh HTN, Hyperthyroidism, with no PPH was diagnosed w/ a meningioma upon CT head performed for memory loss and confusion, ongoing for several months. Patient is s/p bifrontal craniotomy for meningioma resection 10/27 currently on a dexamethasone taper. Psychiatry was originally consulted due to escalating behaviors, patient attempting to elope, A&Ox1 lacking capacity to leave AMA.  -On interview, pt was calm, alert and pleasant a change from extreme confusion/lethargy on 11/01 and prior agitation. Fluctuations in pts mental status are consistent with metabolic encephalopathy (delirium). It is also possible that pts change in mental status is a response to tx with steroids, pt might benefit from tapering steroids more quickly.     Medication recommendations:   1)In agreement re: 25 mg Seroquel at bedtime. If agitated at night, consider increasing to 50 mg Seroquel at bedtime   2) If PRN medication is needed for agitation: PRN Quetiapine 50mg Q6 or PRN Seroquel 50mg Q6. If pt is refusing PO medication, give Olanzapine 1M/PO 5mg Q6   3) Continue melatonin 10mg PO; pt should receive at bedtime     4) Avoid benzodiazepines and anticholinergic agents these can worsen agitation  5) Continue to work pt up for an underlying medical cause of delirium  6) Supervised room per nursing protocol - Pt is a 77 year old female with pmh HTN, Hyperthyroidism, with no PPH was diagnosed w/ a meningioma upon CT head performed for memory loss and confusion, ongoing for several months. Patient is s/p bifrontal craniotomy for meningioma resection 10/27 currently on a dexamethasone taper. Psychiatry was originally consulted due to escalating behaviors, patient attempting to elope, A&Ox1 lacking capacity to leave AMA.  -On interview, pt was calm, alert and pleasant a change from extreme confusion/lethargy on 11/01 and prior agitation. Fluctuations in pts mental status are consistent with metabolic encephalopathy (delirium). It is also possible that pts change in mental status is a response to tx with steroids, pt might benefit from tapering steroids more quickly.     Medication recommendations:   1)In agreement re: 25 mg Seroquel at bedtime. If agitated at night, consider increasing to 50 mg Seroquel at bedtime   2) If PRN medication is needed for agitation: PRN Quetiapine 50mg Q6 or PRN Seroquel 50mg Q6. If pt is refusing PO medication, give Olanzapine IM/PO 5mg Q6h prn agitation  3) Continue melatonin 10mg PO; pt should receive at bedtime     4) Avoid benzodiazepines and anticholinergic agents these can worsen agitation  5) Continue to work pt up for an underlying medical cause of delirium  6) Supervised room per nursing protocol

## 2022-11-03 LAB
ANION GAP SERPL CALC-SCNC: 8 MMOL/L — SIGNIFICANT CHANGE UP (ref 5–17)
ANISOCYTOSIS BLD QL: SLIGHT — SIGNIFICANT CHANGE UP
BUN SERPL-MCNC: 22 MG/DL — SIGNIFICANT CHANGE UP (ref 7–23)
BURR CELLS BLD QL SMEAR: PRESENT — SIGNIFICANT CHANGE UP
CALCIUM SERPL-MCNC: 8.8 MG/DL — SIGNIFICANT CHANGE UP (ref 8.4–10.5)
CHLORIDE SERPL-SCNC: 109 MMOL/L — HIGH (ref 96–108)
CO2 SERPL-SCNC: 27 MMOL/L — SIGNIFICANT CHANGE UP (ref 22–31)
CREAT SERPL-MCNC: 0.75 MG/DL — SIGNIFICANT CHANGE UP (ref 0.5–1.3)
EGFR: 83 ML/MIN/1.73M2 — SIGNIFICANT CHANGE UP
GLUCOSE SERPL-MCNC: 101 MG/DL — HIGH (ref 70–99)
HCT VFR BLD CALC: 31.7 % — LOW (ref 34.5–45)
HGB BLD-MCNC: 10.3 G/DL — LOW (ref 11.5–15.5)
HYPOCHROMIA BLD QL: SLIGHT — SIGNIFICANT CHANGE UP
MACROCYTES BLD QL: SLIGHT — SIGNIFICANT CHANGE UP
MAGNESIUM SERPL-MCNC: 2.2 MG/DL — SIGNIFICANT CHANGE UP (ref 1.6–2.6)
MANUAL SMEAR VERIFICATION: SIGNIFICANT CHANGE UP
MCHC RBC-ENTMCNC: 22.3 PG — LOW (ref 27–34)
MCHC RBC-ENTMCNC: 32.5 GM/DL — SIGNIFICANT CHANGE UP (ref 32–36)
MCV RBC AUTO: 68.6 FL — LOW (ref 80–100)
MICROCYTES BLD QL: SLIGHT — SIGNIFICANT CHANGE UP
NRBC # BLD: 0 /100 WBCS — SIGNIFICANT CHANGE UP (ref 0–0)
OVALOCYTES BLD QL SMEAR: SLIGHT — SIGNIFICANT CHANGE UP
PHOSPHATE SERPL-MCNC: 3.9 MG/DL — SIGNIFICANT CHANGE UP (ref 2.5–4.5)
PLAT MORPH BLD: ABNORMAL
PLATELET # BLD AUTO: 219 K/UL — SIGNIFICANT CHANGE UP (ref 150–400)
POIKILOCYTOSIS BLD QL AUTO: SLIGHT — SIGNIFICANT CHANGE UP
POLYCHROMASIA BLD QL SMEAR: SLIGHT — SIGNIFICANT CHANGE UP
POTASSIUM SERPL-MCNC: 4.2 MMOL/L — SIGNIFICANT CHANGE UP (ref 3.5–5.3)
POTASSIUM SERPL-SCNC: 4.2 MMOL/L — SIGNIFICANT CHANGE UP (ref 3.5–5.3)
RBC # BLD: 4.62 M/UL — SIGNIFICANT CHANGE UP (ref 3.8–5.2)
RBC # FLD: 16 % — HIGH (ref 10.3–14.5)
RBC BLD AUTO: ABNORMAL
SCHISTOCYTES BLD QL AUTO: SLIGHT — SIGNIFICANT CHANGE UP
SODIUM SERPL-SCNC: 144 MMOL/L — SIGNIFICANT CHANGE UP (ref 135–145)
TARGETS BLD QL SMEAR: SLIGHT — SIGNIFICANT CHANGE UP
WBC # BLD: 11.86 K/UL — HIGH (ref 3.8–10.5)
WBC # FLD AUTO: 11.86 K/UL — HIGH (ref 3.8–10.5)

## 2022-11-03 PROCEDURE — 99232 SBSQ HOSP IP/OBS MODERATE 35: CPT

## 2022-11-03 PROCEDURE — 99231 SBSQ HOSP IP/OBS SF/LOW 25: CPT

## 2022-11-03 RX ADMIN — ENOXAPARIN SODIUM 40 MILLIGRAM(S): 100 INJECTION SUBCUTANEOUS at 21:49

## 2022-11-03 RX ADMIN — Medication 2 MILLIGRAM(S): at 06:13

## 2022-11-03 RX ADMIN — Medication 50 MILLIGRAM(S): at 06:13

## 2022-11-03 RX ADMIN — Medication 1 TABLET(S): at 18:15

## 2022-11-03 RX ADMIN — QUETIAPINE FUMARATE 25 MILLIGRAM(S): 200 TABLET, FILM COATED ORAL at 10:07

## 2022-11-03 RX ADMIN — PANTOPRAZOLE SODIUM 40 MILLIGRAM(S): 20 TABLET, DELAYED RELEASE ORAL at 06:13

## 2022-11-03 RX ADMIN — Medication 2 MILLIGRAM(S): at 18:16

## 2022-11-03 RX ADMIN — Medication 1 TABLET(S): at 06:12

## 2022-11-03 RX ADMIN — Medication 10 MILLIGRAM(S): at 21:49

## 2022-11-03 RX ADMIN — POLYETHYLENE GLYCOL 3350 17 GRAM(S): 17 POWDER, FOR SOLUTION ORAL at 06:12

## 2022-11-03 RX ADMIN — LISINOPRIL 10 MILLIGRAM(S): 2.5 TABLET ORAL at 06:13

## 2022-11-03 RX ADMIN — SENNA PLUS 2 TABLET(S): 8.6 TABLET ORAL at 21:49

## 2022-11-03 RX ADMIN — QUETIAPINE FUMARATE 25 MILLIGRAM(S): 200 TABLET, FILM COATED ORAL at 21:49

## 2022-11-03 RX ADMIN — CHLORHEXIDINE GLUCONATE 1 APPLICATION(S): 213 SOLUTION TOPICAL at 06:12

## 2022-11-03 RX ADMIN — BRIVARACETAM 50 MILLIGRAM(S): 25 TABLET, FILM COATED ORAL at 06:13

## 2022-11-03 RX ADMIN — BRIVARACETAM 50 MILLIGRAM(S): 25 TABLET, FILM COATED ORAL at 18:15

## 2022-11-03 NOTE — BH CONSULTATION LIAISON PROGRESS NOTE - NSBHASSESSMENTFT_PSY_ALL_CORE
Pt is a 77 year old female with pmh HTN, Hyperthyroidism, with no PPH was diagnosed w/ a meningioma upon CT head performed for memory loss and confusion, ongoing for several months. Patient is s/p bifrontal craniotomy for meningioma resection 10/27 currently on a dexamethasone taper. Psychiatry was originally consulted due to escalating behaviors, patient attempting to elope, A&Ox1 lacking capacity to leave AMA.  -On interview, pt was alert and pleasant a change from extreme confusion/lethargy on 11/01 and prior agitation. However, collateral from nursing staff describes the patient as needing PRN medication (25 mg Seroquel @ 10:00 AM) d/t constantly trying to get out of her bed. Fluctuations in pts mental status are consistent with metabolic encephalopathy (delirium). It is also possible that pts change in mental status is a response to tx with steroids, pt might benefit from tapering steroids more quickly.     Medication recommendations:   1)D/t continued confusion/agitation increase 25 mg Seroquel to 50 mg Seroquel at bedtime   2) If PRN medication is needed for agitation: PRN Quetiapine 50mg Q6 or PRN Seroquel 50mg Q6. If pt is refusing PO medication, give Olanzapine IM/PO 5mg Q6h prn agitation  3) Continue melatonin 10mg PO; pt should receive at bedtime     4) Avoid benzodiazepines and anticholinergic agents these can worsen agitation  5) Continue to work pt up for an underlying medical cause of delirium  6) Supervised room per nursing protocol

## 2022-11-03 NOTE — BH CONSULTATION LIAISON PROGRESS NOTE - NSBHMSESPEECH_PSY_A_CORE
Normal volume, rate, productivity, spontaneity and articulation
Abnormal as indicated, otherwise normal...
Normal volume, rate, productivity, spontaneity and articulation

## 2022-11-03 NOTE — BH CONSULTATION LIAISON PROGRESS NOTE - NSBHADMITCOUNSEL_PSY_A_CORE
diagnostic results/impressions and/or recommended studies/risks and benefits of treatment options/risk factor reduction/client/family/caregiver education

## 2022-11-03 NOTE — BH CONSULTATION LIAISON PROGRESS NOTE - NSBHCHARTREVIEWVS_PSY_A_CORE FT
Vital Signs Last 24 Hrs  T(C): 36.8 (03 Nov 2022 08:17), Max: 36.8 (02 Nov 2022 20:30)  T(F): 98.2 (03 Nov 2022 08:17), Max: 98.2 (02 Nov 2022 20:30)  HR: 92 (03 Nov 2022 08:17) (75 - 94)  BP: 117/71 (03 Nov 2022 08:17) (103/62 - 133/82)  BP(mean): --  RR: 18 (03 Nov 2022 08:17) (16 - 18)  SpO2: 95% (03 Nov 2022 08:17) (95% - 97%)    Parameters below as of 03 Nov 2022 08:17  Patient On (Oxygen Delivery Method): room air    
Vital Signs Last 24 Hrs  T(C): 36.6 (01 Nov 2022 09:24), Max: 36.7 (31 Oct 2022 17:00)  T(F): 97.8 (01 Nov 2022 09:24), Max: 98.1 (31 Oct 2022 17:00)  HR: 83 (01 Nov 2022 09:24) (79 - 112)  BP: 169/97 (01 Nov 2022 10:32) (134/78 - 189/98)  BP(mean): 122 (01 Nov 2022 06:34) (118 - 122)  RR: 18 (01 Nov 2022 09:24) (16 - 18)  SpO2: 97% (01 Nov 2022 09:24) (95% - 100%)    Parameters below as of 01 Nov 2022 09:24  Patient On (Oxygen Delivery Method): room air    
Vital Signs Last 24 Hrs  T(C): 36.7 (02 Nov 2022 12:23), Max: 36.9 (01 Nov 2022 19:05)  T(F): 98.1 (02 Nov 2022 12:23), Max: 98.5 (01 Nov 2022 19:05)  HR: 65 (02 Nov 2022 12:23) (55 - 85)  BP: 130/62 (02 Nov 2022 12:23) (98/62 - 156/74)  BP(mean): 81 (02 Nov 2022 12:00) (74 - 114)  RR: 17 (02 Nov 2022 12:23) (15 - 19)  SpO2: 95% (02 Nov 2022 12:23) (95% - 100%)    Parameters below as of 02 Nov 2022 12:23  Patient On (Oxygen Delivery Method): room air

## 2022-11-03 NOTE — BH CONSULTATION LIAISON PROGRESS NOTE - NSBHPSYCHOLCOGABN_PSY_A_CORE
disoriented to time/disoriented to place/disoriented to situation
disoriented to place
disoriented to place/disoriented to situation

## 2022-11-03 NOTE — PROGRESS NOTE ADULT - SUBJECTIVE AND OBJECTIVE BOX
SUBJECTIVE: Patient states she feels ok. Tries to tell me what she means but confabulates d/t inability to recall the proper words. Patient understands she is not making sense and is frustrated. Denies pain, discomfort, or any systemic symptoms at this time.     HOSPITAL COURSE:  10/27: POD# 0 S/P bifrontal craniotomy for removal of skull base mass (frozen: meningioma.) Postop, Pt reports mild headache otherwise neurologically stable. Waxing/waning neuro exam. CTH stable, but with significant edema. Na 143, given 250cc 3% bolus.   10/28: POD1. CTH overnight stable. Oxycodone dc'd due to intermittent lethargy. He removed pending TOV. Failed TOV, straight cath prn.   10/29: POD2. Neuro stable. JPx2. Agitated overnight, precedex started transiently. SBP drop, precedex dc'd and 1L bolus given. Started on seroquel 12.5 at bedtime prn.  10/30: POD3, given additional 12.5 mg seroquel and placed on wrist restraints overnight for agitation. Wrist restraints removed. JUN # 2 removed. Metoprolol increased to 50 BID. Iron studies ordered for microcytic anemia.    10/31: POD4 BALJINDER overnight   11: POD5. patient agitated overnight attemtping to leave hospital, security at bedside. ordered 1:1 supervision. psych consulted, recc 10mg melatonin qd, seroquel 100mg BID and zyprexa up to 30mg/day. patient on restraints: b/l wrist/mittens, and soft vest./98 this morning hydral given, pending nutrition consult, upgraded to ICU for decreased level of arousal   2: POD 6 mening resection, ICU overnight, mental status improved but not fully back to post op baseline per son. CTH in AM stable, moved to SDU. Seroquel dose decreased per  reccomendations.   3: POD 7 meningioma resection. mental status improving.     Vital Signs Last 24 Hrs  T(C): 36.4 (2022 00:42), Max: 36.9 (2022 09:06)  T(F): 97.5 (2022 00:42), Max: 98.5 (2022 09:06)  HR: 80 (2022 00:42) (55 - 94)  BP: 120/68 (2022 00:42) (101/56 - 156/74)  BP(mean): 81 (2022 12:00) (74 - 108)  RR: 16 (2022 00:42) (15 - 18)  SpO2: 97% (2022 00:42) (95% - 100%)    Parameters below as of 2022 00:42  Patient On (Oxygen Delivery Method): room air    I&O's Summary    2022 07:01  -  2022 07:00  --------------------------------------------------------  IN: 0 mL / OUT: 1750 mL / NET: -1750 mL    2022 07:01  -  2022 03:07  --------------------------------------------------------  IN: 0 mL / OUT: 800 mL / NET: -800 mL    PHYSICAL EXAM:  General: NAD, pt is comfortably sitting up in bed, on room air  HEENT: EOMI b/l, face symmetric, tongue midline, neck FROM  Cardiovascular: Regular rate and rhythm  Respiratory: nonlabored breathing, normal chest rise  GI: abdomen soft, nontender, nondistended  Neuro: CN II-XII grossly intact, PERRL 3mm, R pupil slightly more sluggish than L   A&Ox3, speech inappropriate at times, SLADE x4 spontaneously, 5/5 strength in all extremities throughout.   Extremities: distal pulses 2+ x4  Wound/incision: brifrontal crani deb c/d/i    LABS:                        10.1   9.66  )-----------( 187      ( 2022 05:38 )             30.6     11-02    146<H>  |  110<H>  |  19  ----------------------------<  95  4.2   |  28  |  0.65    Ca    8.9      2022 05:38  Phos  4.2       Mg     2.0             Urinalysis Basic - ( 2022 16:40 )    Color: Yellow / Appearance: Clear / S.010 / pH: x  Gluc: x / Ketone: NEGATIVE  / Bili: Negative / Urobili: 0.2 E.U./dL   Blood: x / Protein: NEGATIVE mg/dL / Nitrite: NEGATIVE   Leuk Esterase: NEGATIVE / RBC: x / WBC x   Sq Epi: x / Non Sq Epi: x / Bacteria: x          CAPILLARY BLOOD GLUCOSE          Drug Levels: [] N/A    CSF Analysis: [] N/A      Allergies    No Known Drug Allergies  strawberry (Hives)    Intolerances      MEDICATIONS:  Antibiotics:  amoxicillin  500 milliGRAM(s)/clavulanate 1 Tablet(s) Oral every 12 hours    Neuro:  acetaminophen     Tablet .. 650 milliGRAM(s) Oral every 6 hours PRN  brivaracetam 50 milliGRAM(s) Oral every 12 hours  melatonin 10 milliGRAM(s) Oral at bedtime  QUEtiapine 25 milliGRAM(s) Oral at bedtime  QUEtiapine 25 milliGRAM(s) Oral <User Schedule> PRN    Anticoagulation:  enoxaparin Injectable 40 milliGRAM(s) SubCutaneous <User Schedule>    OTHER:  benzocaine 15 mG/menthol 3.6 mG Lozenge 1 Lozenge Oral daily  bisacodyl 5 milliGRAM(s) Oral daily PRN  chlorhexidine 2% Cloths 1 Application(s) Topical <User Schedule>  dexAMETHasone     Tablet   Oral   dexAMETHasone     Tablet 2 milliGRAM(s) Oral every 8 hours  dexAMETHasone     Tablet 2 milliGRAM(s) Oral every 12 hours  hydrALAZINE Injectable 10 milliGRAM(s) IV Push every 2 hours PRN  lisinopril 10 milliGRAM(s) Oral daily  metoprolol tartrate 50 milliGRAM(s) Oral two times a day  pantoprazole    Tablet 40 milliGRAM(s) Oral before breakfast  polyethylene glycol 3350 17 Gram(s) Oral two times a day  senna 2 Tablet(s) Oral at bedtime    IVF:    CULTURES:    RADIOLOGY & ADDITIONAL TESTS:  < from: CT Head No Cont (22 @ 11:27) >    FINDINGS:    Small subdural hemorrhage at the left tentorium is redemonstrated,   stable. No new or increased intracranial hemorrhage. Extensive edema in   the left frontal lobe extending to the subinsular region remains status   post coronal craniotomy and meningioma resection. No   transcortical/territorial infarct. Rightward midline shift and   ventricular effacement is grossly unchanged. No downward herniation.      IMPRESSION:    Stable small left tentorial subdural hemorrhage. No new hemorrhage or   increased mass effect with extensive edema remaining at the left frontal   lobe after tumor resection.    < end of copied text >      ASSESSMENT:  77 year old female with pmh HTN found to have left frontal brain mass, likely meningioma, on workup for confusion and memory loss. Patient is now s/p bifrontal craniotomy for meningioma resection (frozen: meningioma) 10/27/22    Plan:  Neuro:  - neuro checks/vital signs q2  - pain control PRN  - Bivirat 50 BID   - decadron 4q6 x3d then taper over 1 week  - postop MRI brain done 10/30  - subgaleal #1JP, removed 10/31  - JUN #2 removed 10/30  - CTH 10/27 and ,  - stable, significant edema  - agitation: zyprexa PRN, seroquel 25mg AM PRN, 25mg at bedtime, melatonin 10mg qhs     Cardiac:  - -140  - cont home lisinopril 10mg qd   - Increased home metoprolol to 50mg BID 10/30    Pulmonary:  - on RA, IS    GI:  - Regular diet  - bowel regimen  - PPI while on steroids    Renal:  - IVL, voiding  - straight cath PRN     Heme:  - SCDs, SQL   - LE dopplers 10/26: neg for DVT  - Microcytic anemia: f/u iron studies     Endo:  - A1C 5.1, no issues    ID:  - afebrile, trend leukocytosis  - augmentin x 10 days (10/31-11/10) per Roseline     Disposition: SDU status, full code, PT/OT recs AR, Family updated with plan    Assessment and Plan d/w Dr. Garnica

## 2022-11-03 NOTE — PROGRESS NOTE ADULT - SUBJECTIVE AND OBJECTIVE BOX
SUBJECTIVE:  Patient seen and examined at bedside.  Confused but pleasant and calm.  Reports that today is "much better".  Ate majority of breakfast    ROS:  Denies fevers, chills, SOB, chest pain, Abdominal pain, N/V.  All other ROS negative except as above    Vital Signs Last 12 Hrs  T(F): 98.2 (22 @ 08:17), Max: 98.2 (22 @ 08:17)  HR: 92 (22 @ 08:17) (92 - 92)  BP: 117/71 (22 @ 08:17) (117/71 - 133/82)  BP(mean): --  RR: 18 (22 @ 08:17) (18 - 18)  SpO2: 95% (22 @ 08:17) (95% - 95%)  I&O's Summary    2022 07:  -  2022 07:00  --------------------------------------------------------  IN: 0 mL / OUT: 800 mL / NET: -800 mL    2022 07:  -  2022 14:46  --------------------------------------------------------  IN: 0 mL / OUT: 1200 mL / NET: -1200 mL        PHYSICAL EXAM:  Constitutional: NAD, comfortable in bed.  HEENT: PERRLA, MMM, incision c/d/i, L eye ecchymoses stable  Neck: Supple  Respiratory: CTA B/L. No w/r/r.   Cardiovascular: RRR, normal S1 and S2, no m/r/g.   Gastrointestinal: +BS, soft NTND, no guarding or rebound tenderness, no palpable masses   Extremities: wwp; no cyanosis, clubbing or edema.   Vascular: Pulses equal and strong throughout.   Neurological: AAOx1, tangential, pleasant, moves all extremities spontaneously  Skin: No gross skin abnormalities or rashes        LABS:                        10.3   11.86 )-----------( 219      ( 2022 08:25 )             31.7     11-03    144  |  109<H>  |  22  ----------------------------<  101<H>  4.2   |  27  |  0.75    Ca    8.8      2022 08:25  Phos  3.9     1103  Mg     2.2             Urinalysis Basic - ( 2022 16:40 )    Color: Yellow / Appearance: Clear / S.010 / pH: x  Gluc: x / Ketone: NEGATIVE  / Bili: Negative / Urobili: 0.2 E.U./dL   Blood: x / Protein: NEGATIVE mg/dL / Nitrite: NEGATIVE   Leuk Esterase: NEGATIVE / RBC: x / WBC x   Sq Epi: x / Non Sq Epi: x / Bacteria: x          RADIOLOGY & ADDITIONAL TESTS:  r< from: CT Head No Cont (22 @ 11:27) >  IMPRESSION:    Stable small left tentorial subdural hemorrhage. No new hemorrhage or   increased mass effect with extensive edema remaining at the left frontal   lobe after tumor resection.    < end of copied text >      MEDICATIONS  (STANDING):  amoxicillin  500 milliGRAM(s)/clavulanate 1 Tablet(s) Oral every 12 hours  benzocaine 15 mG/menthol 3.6 mG Lozenge 1 Lozenge Oral daily  brivaracetam 50 milliGRAM(s) Oral every 12 hours  chlorhexidine 2% Cloths 1 Application(s) Topical <User Schedule>  dexAMETHasone     Tablet   Oral   dexAMETHasone     Tablet 2 milliGRAM(s) Oral every 12 hours  enoxaparin Injectable 40 milliGRAM(s) SubCutaneous <User Schedule>  lisinopril 10 milliGRAM(s) Oral daily  melatonin 10 milliGRAM(s) Oral at bedtime  metoprolol tartrate 50 milliGRAM(s) Oral two times a day  pantoprazole    Tablet 40 milliGRAM(s) Oral before breakfast  polyethylene glycol 3350 17 Gram(s) Oral two times a day  QUEtiapine 25 milliGRAM(s) Oral at bedtime  senna 2 Tablet(s) Oral at bedtime    MEDICATIONS  (PRN):  acetaminophen     Tablet .. 650 milliGRAM(s) Oral every 6 hours PRN Mild Pain (1 - 3)  bisacodyl 5 milliGRAM(s) Oral daily PRN Constipation  hydrALAZINE Injectable 10 milliGRAM(s) IV Push every 2 hours PRN SBP>160  QUEtiapine 25 milliGRAM(s) Oral <User Schedule> PRN agitation

## 2022-11-03 NOTE — BH CONSULTATION LIAISON PROGRESS NOTE - NSBHFUPINTERVALHXFT_PSY_A_CORE
- On chart review, received 10mg melatonin at 11pm yesterday and 25mg of Seroquel at 10pm. As per chart review, pt needed no PRNS and collateral from nursing staff said there were no episodes of agitation. UTI workup was negative.  - On interview, pt was pleasant and cooperative. Lethargy had significantly improved  Today pt was AXOX2. She knew the year and her name, but response to location was tangential. After author of this note explained that pt was in the hospital, pt stated that “she knew that” and said she was here d/t a problem with her head, showing some insight  - When asked about her mood, pt stateed she was “feeling better”; noted some residual sadness about her husbands passing 2 years ago but nothing acute  - While pt was engaged in the interview, her response to many questions was largely tangential. She reported good appetite and some tiredness, and by the end of the interview asked if author could come back later as she wanted  tosleep.  - She denied SI, HI but her response when asked about AVH repeatedly was tangential
Onc interview, pt is pleasant, calm and describes her mood as "good." Pt was AXOXO2, knew the month/year and her name but did not know her location (thought she was at the office of Dr. Mckeon" and had no insight into her hospitalization.  Chart review showed that pt needed PRN Seroquel at 10 AM 11/03 re: agitation. Collateral from nursing staff described that pt was repeatedly trying to get out of bed, and is continuing to do so. Nursing staff also describes constantly needing to re-orient the pt to prevent agitation. Pt exhibits some confusion during the interview, and often responds to questions tangentially. Vehemently denies SI, HI and response to AVH was tangential. 
- Pt is a 77 year old female with pmh HTN, Hyperthyroidism, with no PPH was diagnosed w/ a meningioma upon CT head performed for memory loss and confusion, ongoing for several months. Patient is s/p bifrontal craniotomy for meningioma resection 10/27 currently on a dexamethasone taper. Psychiatry was originally consulted due to escalating behaviors, patient attempting to elope, A&Ox1 lacking capacity to leave AMA.  -On interview, pt is extremely confused and lethargic and was unable to participate in the interview. Pt knew her name, but was unable to answer any other question(s) including location, situation, time. Collateral obtained from pt's son at bedside stated that pt seemed diminished today, and less alert and this was a deterioration from her status yesterday. Pt was unable to keep her eyes open during the duration of the interview. Collateral obtained from nursing staff stated that pt had an episode of extreme agitation upon transfer, and needed medications/multiple members of the staff to calm her down.   -Pt muttered "I'm leaving" in her sleep   -Pt is still in restraints. Nursing staff said pt had one episode of mild agitation this morning where she attempted to leave the bed, but was verbally redirectable   -Chart review detailed that pt received:  1. Two (17:55,18:24) IM doses of 1mg haloperidol at 10/31  2. One (22:52) 10mg PO dose of melatonin at  10/31  3. Three (17:31,19:53,23:59) IM doses of Olanzapine 5mg  10/31  4. One (22:11) 100mg PO dose of Seroquel at  on 10/31

## 2022-11-03 NOTE — BH CONSULTATION LIAISON PROGRESS NOTE - CURRENT MEDICATION
MEDICATIONS  (STANDING):  amoxicillin  500 milliGRAM(s)/clavulanate 1 Tablet(s) Oral every 12 hours  benzocaine 15 mG/menthol 3.6 mG Lozenge 1 Lozenge Oral daily  brivaracetam 50 milliGRAM(s) Oral every 12 hours  chlorhexidine 2% Cloths 1 Application(s) Topical <User Schedule>  dexAMETHasone     Tablet   Oral   dexAMETHasone     Tablet 2 milliGRAM(s) Oral every 8 hours  enoxaparin Injectable 40 milliGRAM(s) SubCutaneous <User Schedule>  lisinopril 10 milliGRAM(s) Oral daily  melatonin 10 milliGRAM(s) Oral at bedtime  metoprolol tartrate 50 milliGRAM(s) Oral two times a day  pantoprazole    Tablet 40 milliGRAM(s) Oral before breakfast  polyethylene glycol 3350 17 Gram(s) Oral two times a day  QUEtiapine 25 milliGRAM(s) Oral at bedtime  senna 2 Tablet(s) Oral at bedtime    MEDICATIONS  (PRN):  acetaminophen     Tablet .. 650 milliGRAM(s) Oral every 6 hours PRN Mild Pain (1 - 3)  bisacodyl 5 milliGRAM(s) Oral daily PRN Constipation  hydrALAZINE Injectable 10 milliGRAM(s) IV Push every 2 hours PRN SBP>160  QUEtiapine 25 milliGRAM(s) Oral <User Schedule> PRN agitation  
MEDICATIONS  (STANDING):  amoxicillin  500 milliGRAM(s)/clavulanate 1 Tablet(s) Oral every 12 hours  dexAMETHasone     Tablet   Oral   dexAMETHasone     Tablet 2 milliGRAM(s) Oral every 8 hours  enoxaparin Injectable 40 milliGRAM(s) SubCutaneous every 24 hours  levETIRAcetam 500 milliGRAM(s) Oral two times a day  lisinopril 10 milliGRAM(s) Oral daily  melatonin 10 milliGRAM(s) Oral at bedtime  metoprolol tartrate 50 milliGRAM(s) Oral two times a day  pantoprazole    Tablet 40 milliGRAM(s) Oral before breakfast  QUEtiapine 100 milliGRAM(s) Oral every 12 hours  senna 2 Tablet(s) Oral at bedtime    MEDICATIONS  (PRN):  acetaminophen     Tablet .. 650 milliGRAM(s) Oral every 6 hours PRN Mild Pain (1 - 3)  bisacodyl 5 milliGRAM(s) Oral daily PRN Constipation  hydrALAZINE Injectable 10 milliGRAM(s) IV Push every 2 hours PRN SBP>160  
MEDICATIONS  (STANDING):  amoxicillin  500 milliGRAM(s)/clavulanate 1 Tablet(s) Oral every 12 hours  benzocaine 15 mG/menthol 3.6 mG Lozenge 1 Lozenge Oral daily  brivaracetam 50 milliGRAM(s) Oral every 12 hours  chlorhexidine 2% Cloths 1 Application(s) Topical <User Schedule>  dexAMETHasone     Tablet   Oral   dexAMETHasone     Tablet 2 milliGRAM(s) Oral every 12 hours  enoxaparin Injectable 40 milliGRAM(s) SubCutaneous <User Schedule>  lisinopril 10 milliGRAM(s) Oral daily  melatonin 10 milliGRAM(s) Oral at bedtime  metoprolol tartrate 50 milliGRAM(s) Oral two times a day  pantoprazole    Tablet 40 milliGRAM(s) Oral before breakfast  polyethylene glycol 3350 17 Gram(s) Oral two times a day  QUEtiapine 25 milliGRAM(s) Oral at bedtime  senna 2 Tablet(s) Oral at bedtime    MEDICATIONS  (PRN):  acetaminophen     Tablet .. 650 milliGRAM(s) Oral every 6 hours PRN Mild Pain (1 - 3)  bisacodyl 5 milliGRAM(s) Oral daily PRN Constipation  hydrALAZINE Injectable 10 milliGRAM(s) IV Push every 2 hours PRN SBP>160  QUEtiapine 25 milliGRAM(s) Oral <User Schedule> PRN agitation

## 2022-11-03 NOTE — BH CONSULTATION LIAISON PROGRESS NOTE - NSBHATTESTCOMMENTATTENDFT_PSY_A_CORE
Patient is a 78 yo woman with no PPH and PMH of HTN, Hyperthyroidism who presented w/ likely L frontal meningioma and is s/p bifrontal craniotomy with meningioma resection 10/27. Post op course complicated by delirium with agitation. On 10.31 pt received multiple dosages of prns with olanzapine, haldol and quetiapine and was found to lack capacity to leave AMA. Today patient presents with improved mental status. She was started yesterday on quetiapine 15mg po qhs with good effect  Plan: - pt with persistent nighttime agitation, consider increasing seroquel to 50mg po qhs.  -see above for other recs  -CL to follow as needed  
Patient is a 78 yo woman with no PPH and PMH of HTN, Hyperthyroidism who presented w/ likely L frontal meningioma and is s/p bifrontal craniotomy with meningioma resection 10/27. Post op course complicated by delirium with agitation. On 10.31 pt received multiple dosages of prns with olanzapine, haldol and quetiapine and was found to lack capacity to leave AMA. Today patient presents with improved mental status. She was started yesterday on quetiapine 15mg po qhs with good effect  Plan: - in agreement with using a lower dosage of quetiapine 25mg; if pt presents nighttime agitation the dosage can be increased to 50mg po qhs.  -see above for other recs  -CL to follow as needed  
Patient is a 78 yo woman with no PPH and PMH of HTN, Hyperthyroidism who presented w/ likely L frontal meningioma and is s/p bifrontal craniotomy with meningioma resection 10/27. Post op course complicated by delirium with agitation. On 10.31 pt received multiple dosages of prns with olanzapine, haldol and quetiapine and was found to lack capacity to leave AMA. Today patient continues to be very confused, disorganized but more re-directible. No prns required since yestrerday.  Plan: -continue workup and management of delirium (consider repeat U/A, avoid benzodiazepines/anticholinergics, delirium precautions); -consider a faster taper of steroids as this could be a contributing factor to pt's agitation; -start standing quetiapine 50mg po daily; -prns for agitation with olanzapine 5mg po/im q6h; monitor the qtc; -supervised room as per nursing protocol;  -CL to follow.

## 2022-11-03 NOTE — PROGRESS NOTE ADULT - ASSESSMENT
78 yo F hx of HTN, Hyperthyroidism who presented w/ likely L frontal meningioma and is s/p bifrontal craniotomy with meningioma resection 10/27    #Meningioma  Post op course complicated by agitation. Was briefly on Precedex drip. Now calm  - c/w steroids, PPI and SSI  - Seizure prophylaxis per NSGY  - Rest of care per primary team   - JUN drains removed  - repeat CT head with small tentorial subdural hemorrhage    #Agitation   - Calm, remains confused confusion   - During periods of agitation, would redirect  - off restraints and supervision > 24 hours  - Seroquel at reduced dose 25mg QHS and PRN in AM given oversedation with 100mg  - lights on during day, off at night. Redirection PRN    #Hypernatremia  Resolved  - likely due to poor PO intake  - encouraged increased water intake, will monitor    #Anemia  Baseline Hb 11.2--> 9  - Likely some component of blood loss anemia  - iron panel consistent with inflammatory anemia    #Leukocytosis  Resolved  - No signs of active infection. Currently on post op abx prophylaxis   - Likely from steroids    # HTN   Home meds: Toprol 25mg BID and lisinopril 10mg   - BP at goal on Lopressor 50mg BID  - c/w lisinopril 10mg     DVT ppx: Lovenox, SCDs    Dispo: PT--> AR

## 2022-11-03 NOTE — BH CONSULTATION LIAISON PROGRESS NOTE - NSBHATTESTBILLINGAW_PSY_A_CORE
[id number] Inpatient care - low complexity - 15 minutes
[id number] Inpatient care - low complexity - 15 minutes
[id number] Inpatient care - high complexity - 35 minutes

## 2022-11-03 NOTE — BH CONSULTATION LIAISON PROGRESS NOTE - NSICDXBHPRIMARYDX_PSY_ALL_CORE
Metabolic encephalopathy   G93.41  

## 2022-11-03 NOTE — BH CONSULTATION LIAISON PROGRESS NOTE - LEVEL OF CONSCIOUSNESS
Francine requests that nurse  return their call. The best time to reach her is Anytime. Patient having some discharge and odor and needs speak  with a nurse     Thank you.
Rx sent in. Pt to call back for appt if symptoms do not improve.
Lethargic, arousable to verbal stimulus
Alert
Lethargic, arousable to verbal stimulus

## 2022-11-04 LAB
ANION GAP SERPL CALC-SCNC: 8 MMOL/L — SIGNIFICANT CHANGE UP (ref 5–17)
BUN SERPL-MCNC: 20 MG/DL — SIGNIFICANT CHANGE UP (ref 7–23)
CALCIUM SERPL-MCNC: 9 MG/DL — SIGNIFICANT CHANGE UP (ref 8.4–10.5)
CHLORIDE SERPL-SCNC: 108 MMOL/L — SIGNIFICANT CHANGE UP (ref 96–108)
CO2 SERPL-SCNC: 26 MMOL/L — SIGNIFICANT CHANGE UP (ref 22–31)
CREAT SERPL-MCNC: 0.67 MG/DL — SIGNIFICANT CHANGE UP (ref 0.5–1.3)
EGFR: 91 ML/MIN/1.73M2 — SIGNIFICANT CHANGE UP
GLUCOSE SERPL-MCNC: 101 MG/DL — HIGH (ref 70–99)
HCT VFR BLD CALC: 32.6 % — LOW (ref 34.5–45)
HGB BLD-MCNC: 10.7 G/DL — LOW (ref 11.5–15.5)
MAGNESIUM SERPL-MCNC: 1.9 MG/DL — SIGNIFICANT CHANGE UP (ref 1.6–2.6)
MCHC RBC-ENTMCNC: 22.7 PG — LOW (ref 27–34)
MCHC RBC-ENTMCNC: 32.8 GM/DL — SIGNIFICANT CHANGE UP (ref 32–36)
MCV RBC AUTO: 69.1 FL — LOW (ref 80–100)
NRBC # BLD: 0 /100 WBCS — SIGNIFICANT CHANGE UP (ref 0–0)
PHOSPHATE SERPL-MCNC: 3.2 MG/DL — SIGNIFICANT CHANGE UP (ref 2.5–4.5)
PLATELET # BLD AUTO: 236 K/UL — SIGNIFICANT CHANGE UP (ref 150–400)
POTASSIUM SERPL-MCNC: 4 MMOL/L — SIGNIFICANT CHANGE UP (ref 3.5–5.3)
POTASSIUM SERPL-SCNC: 4 MMOL/L — SIGNIFICANT CHANGE UP (ref 3.5–5.3)
RBC # BLD: 4.72 M/UL — SIGNIFICANT CHANGE UP (ref 3.8–5.2)
RBC # FLD: 16 % — HIGH (ref 10.3–14.5)
SODIUM SERPL-SCNC: 142 MMOL/L — SIGNIFICANT CHANGE UP (ref 135–145)
SURGICAL PATHOLOGY STUDY: SIGNIFICANT CHANGE UP
WBC # BLD: 11.64 K/UL — HIGH (ref 3.8–10.5)
WBC # FLD AUTO: 11.64 K/UL — HIGH (ref 3.8–10.5)

## 2022-11-04 PROCEDURE — 99232 SBSQ HOSP IP/OBS MODERATE 35: CPT

## 2022-11-04 PROCEDURE — 99024 POSTOP FOLLOW-UP VISIT: CPT

## 2022-11-04 RX ADMIN — Medication 10 MILLIGRAM(S): at 22:21

## 2022-11-04 RX ADMIN — Medication 50 MILLIGRAM(S): at 06:05

## 2022-11-04 RX ADMIN — Medication 1 TABLET(S): at 17:27

## 2022-11-04 RX ADMIN — Medication 2 MILLIGRAM(S): at 06:03

## 2022-11-04 RX ADMIN — POLYETHYLENE GLYCOL 3350 17 GRAM(S): 17 POWDER, FOR SOLUTION ORAL at 06:02

## 2022-11-04 RX ADMIN — PANTOPRAZOLE SODIUM 40 MILLIGRAM(S): 20 TABLET, DELAYED RELEASE ORAL at 06:05

## 2022-11-04 RX ADMIN — CHLORHEXIDINE GLUCONATE 1 APPLICATION(S): 213 SOLUTION TOPICAL at 06:02

## 2022-11-04 RX ADMIN — Medication 50 MILLIGRAM(S): at 17:27

## 2022-11-04 RX ADMIN — Medication 1 TABLET(S): at 06:02

## 2022-11-04 RX ADMIN — BENZOCAINE AND MENTHOL 1 LOZENGE: 5; 1 LIQUID ORAL at 12:38

## 2022-11-04 RX ADMIN — QUETIAPINE FUMARATE 25 MILLIGRAM(S): 200 TABLET, FILM COATED ORAL at 22:21

## 2022-11-04 RX ADMIN — BRIVARACETAM 50 MILLIGRAM(S): 25 TABLET, FILM COATED ORAL at 06:03

## 2022-11-04 RX ADMIN — BRIVARACETAM 50 MILLIGRAM(S): 25 TABLET, FILM COATED ORAL at 18:59

## 2022-11-04 RX ADMIN — ENOXAPARIN SODIUM 40 MILLIGRAM(S): 100 INJECTION SUBCUTANEOUS at 22:20

## 2022-11-04 RX ADMIN — Medication 2 MILLIGRAM(S): at 17:28

## 2022-11-04 RX ADMIN — SENNA PLUS 2 TABLET(S): 8.6 TABLET ORAL at 22:20

## 2022-11-04 RX ADMIN — LISINOPRIL 10 MILLIGRAM(S): 2.5 TABLET ORAL at 06:03

## 2022-11-04 RX ADMIN — POLYETHYLENE GLYCOL 3350 17 GRAM(S): 17 POWDER, FOR SOLUTION ORAL at 17:27

## 2022-11-04 RX ADMIN — QUETIAPINE FUMARATE 25 MILLIGRAM(S): 200 TABLET, FILM COATED ORAL at 15:43

## 2022-11-04 NOTE — SPEECH LANGUAGE PATHOLOGY EVALUATION - SLP AUTOMATIC SPEECH
Given initial day/month for task initiation, pt provided all subsequent days of the week and months of the year/intact/days of the week/months of the year

## 2022-11-04 NOTE — SPEECH LANGUAGE PATHOLOGY EVALUATION - SLP PERTINENT HISTORY OF CURRENT PROBLEM
Pt presented with left frontal mass, likely meningioma. S/P bifrontal craniotomy for removal of skull base mass Pt presented with left frontal mass, likely meningioma. MR revealed, "3.1 cm left parafalcine extra-axial mass with extensive vasogenic edema in the left frontal lobe, left temporal lobe with associated mass effect and midline shift[...]." S/P bifrontal craniotomy for removal of skull base mass

## 2022-11-04 NOTE — SPEECH LANGUAGE PATHOLOGY EVALUATION - SLP GENERAL OBSERVATIONS
Received awake in Received sleeping in bed, awoke to verbal stim, 1:1 sitter at bedside, poor self-awareness of deficits.

## 2022-11-04 NOTE — SPEECH LANGUAGE PATHOLOGY EVALUATION - CONFRONTATIONAL NAMING
4/7 given rare semantic cues. At times, responses were non-sensical and included stuck-in-set perseverations. For example, when asked to label the TV, pt said, "It's right up there. Do a little bit of water. It's a pathway to do your 2022." 1 semantic paraphasia ("table"/chair)./impaired

## 2022-11-04 NOTE — PROGRESS NOTE ADULT - SUBJECTIVE AND OBJECTIVE BOX
HPI:  77 year old female with pmh HTN who lives in [place] presents with left frontal mass, likely meningioma. Per son, meningioma was diagnosed upon CT head performed for memory loss and confusion. Son reports that confusion has been ongoing for several months. Patient admits to mild headache but denies diplopia, blurry vision, nausea, vomiting, extremity numbness or weakness and slurred speech. Patient is preop for bifrontal craniotomy for meningioma resection 10/27.   (26 Oct 2022 10:58)    10/27: POD# 0 S/P bifrontal craniotomy for removal of skull base mass (frozen: meningioma.) Postop, Pt reports mild headache otherwise neurologically stable. Waxing/waning neuro exam. CTH stable, but with significant edema. Na 143, given 250cc 3% bolus.   10/28: POD1. CTH overnight stable. Oxycodone dc'd due to intermittent lethargy. He removed pending TOV. Failed TOV, straight cath prn.   10/29: POD2. Neuro stable. JPx2. Agitated overnight, precedex started transiently. SBP drop, precedex dc'd and 1L bolus given. Started on seroquel 12.5 at bedtime prn.  10/30: POD3, given additional 12.5 mg seroquel and placed on wrist restraints overnight for agitation. Wrist restraints removed. JUN # 2 removed. Metoprolol increased to 50 BID. Iron studies ordered for microcytic anemia.    10/31: POD4 BALJINDER overnight   11/1: POD5. patient agitated overnight attemtping to leave hospital, security at bedside. ordered 1:1 supervision. psych consulted, recc 10mg melatonin qd, seroquel 100mg BID and zyprexa up to 30mg/day. patient on restraints: b/l wrist/mittens, and soft vest./98 this morning hydral given, pending nutrition consult, upgraded to ICU for decreased level of arousal   11/2: POD 6 mening resection, ICU overnight, mental status improved but not fully back to post op baseline per son. CTH in AM stable, moved to SDU. Seroquel dose decreased per  reccomendations.   11/3: POD 7 meningioma resection. mental status improving.     OVERNIGHT EVENTS: no acute events   Vital Signs Last 24 Hrs  T(C): 36.8 (04 Nov 2022 04:46), Max: 36.9 (03 Nov 2022 18:13)  T(F): 98.2 (04 Nov 2022 04:46), Max: 98.5 (03 Nov 2022 18:13)  HR: 72 (04 Nov 2022 04:46) (72 - 103)  BP: 126/75 (04 Nov 2022 04:46) (112/55 - 136/74)  BP(mean): --  RR: 16 (04 Nov 2022 04:46) (16 - 18)  SpO2: 96% (04 Nov 2022 04:46) (94% - 96%)    Parameters below as of 04 Nov 2022 04:46  Patient On (Oxygen Delivery Method): room air        I&O's Summary    02 Nov 2022 07:01  -  03 Nov 2022 07:00  --------------------------------------------------------  IN: 0 mL / OUT: 800 mL / NET: -800 mL    03 Nov 2022 07:01  -  04 Nov 2022 06:09  --------------------------------------------------------  IN: 0 mL / OUT: 2950 mL / NET: -2950 mL    PHYSICAL EXAM:  General: NAD, pt is comfortably sitting up in bed, on room air  HEENT: EOMI b/l, face symmetric, tongue midline, neck FROM  Cardiovascular: Regular rate and rhythm  Respiratory: nonlabored breathing, normal chest rise  GI: abdomen soft, nontender, nondistended  Neuro: CN II-XII grossly intact, PERRL 3mm, R pupil slightly more sluggish than L   A&Ox3, speech inappropriate at times, SLADE x4 spontaneously, 5/5 strength in all extremities throughout.   Extremities: distal pulses 2+ x4  Wound/incision: brifrontal crani deb     LABS:                        10.3   11.86 )-----------( 219      ( 03 Nov 2022 08:25 )             31.7     11-03    144  |  109<H>  |  22  ----------------------------<  101<H>  4.2   |  27  |  0.75    Ca    8.8      03 Nov 2022 08:25  Phos  3.9     11-03  Mg     2.2     11-03              CAPILLARY BLOOD GLUCOSE          Drug Levels: [] N/A    CSF Analysis: [] N/A      Allergies    No Known Drug Allergies  strawberry (Hives)    Intolerances      MEDICATIONS:  Antibiotics:  amoxicillin  500 milliGRAM(s)/clavulanate 1 Tablet(s) Oral every 12 hours    Neuro:  acetaminophen     Tablet .. 650 milliGRAM(s) Oral every 6 hours PRN  brivaracetam 50 milliGRAM(s) Oral every 12 hours  melatonin 10 milliGRAM(s) Oral at bedtime  QUEtiapine 25 milliGRAM(s) Oral at bedtime  QUEtiapine 25 milliGRAM(s) Oral <User Schedule> PRN    Anticoagulation:  enoxaparin Injectable 40 milliGRAM(s) SubCutaneous <User Schedule>    OTHER:  benzocaine 15 mG/menthol 3.6 mG Lozenge 1 Lozenge Oral daily  bisacodyl 5 milliGRAM(s) Oral daily PRN  chlorhexidine 2% Cloths 1 Application(s) Topical <User Schedule>  dexAMETHasone     Tablet   Oral   dexAMETHasone     Tablet 2 milliGRAM(s) Oral every 12 hours  hydrALAZINE Injectable 10 milliGRAM(s) IV Push every 2 hours PRN  lisinopril 10 milliGRAM(s) Oral daily  metoprolol tartrate 50 milliGRAM(s) Oral two times a day  pantoprazole    Tablet 40 milliGRAM(s) Oral before breakfast  polyethylene glycol 3350 17 Gram(s) Oral two times a day  senna 2 Tablet(s) Oral at bedtime    77 year old female with pmh HTN found to have left frontal brain mass, likely meningioma, on workup for confusion and memory loss. Patient is now s/p bifrontal craniotomy for meningioma resection (frozen: meningioma) 10/27/22    Plan:  Neuro:  - neuro checks/vital signs q4  - pain control PRN  - Bivirat 50 BID   - decadron 4q6 x3d then taper over 1 week  - postop MRI brain done 10/30  - CTH 10/27 and 11/1, 11/2 - stable, significant edema  - agitation: zyprexa PRN, seroquel 25mg AM PRN, 25mg at bedtime, melatonin 10mg qhs     Cardiac:  - -140  - cont home lisinopril 10mg qd   - Increased home metoprolol to 50mg BID 10/30    Pulmonary:  - on RA, IS    GI:  - Regular diet  - bowel regimen  - PPI while on steroids    Renal:  - voiding  - straight cath PRN     Heme:  - SCDs, SQL   - LE dopplers 10/26: neg for DVT  - Microcytic anemia: f/u iron studies     Endo:  - A1C 5.1, no issues    ID:  - afebrile, trend leukocytosis  - augmentin x 10 days (10/31-11/10) per Roseline     Disposition: SDU status, full code, PT/OT recs AR, Family updated with plan    Assessment and Plan d/w Dr. Garnica

## 2022-11-04 NOTE — PROGRESS NOTE ADULT - ASSESSMENT
76 yo F hx of HTN, Hyperthyroidism who presented w/ likely L frontal meningioma and is s/p bifrontal craniotomy with meningioma resection 10/27    #Meningioma  Post op course complicated by agitation. Was briefly on Precedex drip. Now calm  - c/w steroids, PPI and SSI  - Seizure prophylaxis per NSGY  - Rest of care per primary team   - JUN drains removed  - repeat CT head with small tentorial subdural hemorrhage    #Agitation   - RESOLVED  - Calm, remains confused   - Redirectable  - off restraints and supervision > 48 hours  - Seroquel at reduced dose 25mg QHS and PRN in AM given oversedation with 100mg  - lights on during day, off at night. Redirection PRN    #Hypernatremia  Resolved  - likely due to poor PO intake  - encouraged increased water intake, will monitor    #Anemia  Baseline Hb 11.2--> 9  - Likely some component of blood loss anemia, now stable ~10  - iron panel consistent with inflammatory anemia    #Leukocytosis  - No signs of active infection. Currently on post op abx prophylaxis   - Likely from steroids    # HTN   Home meds: Toprol 25mg BID and lisinopril 10mg   - BP at goal on Lopressor 50mg BID  - c/w lisinopril 10mg     DVT ppx: Lovenox, SCDs    Dispo: PT--> AR vs home with family assist

## 2022-11-04 NOTE — SPEECH LANGUAGE PATHOLOGY EVALUATION - SPELLING
e.g. of errors "pine"/pen, "bead"/bed"/impaired Phonologically plausible/regularization errors ("bead"/bed")/impaired

## 2022-11-04 NOTE — PROGRESS NOTE ADULT - SUBJECTIVE AND OBJECTIVE BOX
SUBJECTIVE:  Patient seen and examined at bedside.  Alert to self, pleasant.  Ate full breakfast tray.  Denies any complaints.  Last BM this AM    ROS:  Denies fevers, chills, headache, vision changes, neck pain, cough, SOB, chest pain, Abdominal pain, N/V, dysuria or new rash.  All other ROS negative except as above    Vital Signs Last 12 Hrs  T(F): 98.2 (11-04-22 @ 13:32), Max: 98.2 (11-04-22 @ 04:46)  HR: 95 (11-04-22 @ 13:32) (72 - 95)  BP: 100/70 (11-04-22 @ 13:32) (100/70 - 126/75)  BP(mean): --  RR: 18 (11-04-22 @ 13:32) (16 - 18)  SpO2: 93% (11-04-22 @ 13:32) (93% - 96%)  I&O's Summary    03 Nov 2022 07:01  -  04 Nov 2022 07:00  --------------------------------------------------------  IN: 0 mL / OUT: 2950 mL / NET: -2950 mL        PHYSICAL EXAM:  Constitutional: NAD, comfortable in bed.  HEENT: CHIP, MMM, incision c/d/i, L eye ecchymoses stable  Neck: Supple  Respiratory: CTA B/L. No w/r/r.   Cardiovascular: RRR, normal S1 and S2, no m/r/g.   Gastrointestinal: +BS, soft NTND, no guarding or rebound tenderness, no palpable masses   Extremities: wwp; no cyanosis, clubbing or edema.   Vascular: Pulses equal and strong throughout.   Neurological: AAOx1-2 self, knows she is in a hospital, tangential, pleasant, moves all extremities spontaneously  Skin: No gross skin abnormalities or rashes        LABS:                        10.7   11.64 )-----------( 236      ( 04 Nov 2022 08:50 )             32.6     11-04    142  |  108  |  20  ----------------------------<  101<H>  4.0   |  26  |  0.67    Ca    9.0      04 Nov 2022 08:50  Phos  3.2     11-04  Mg     1.9     11-04              RADIOLOGY & ADDITIONAL TESTS:  No new imaging    MEDICATIONS  (STANDING):  amoxicillin  500 milliGRAM(s)/clavulanate 1 Tablet(s) Oral every 12 hours  benzocaine 15 mG/menthol 3.6 mG Lozenge 1 Lozenge Oral daily  brivaracetam 50 milliGRAM(s) Oral every 12 hours  dexAMETHasone     Tablet   Oral   dexAMETHasone     Tablet 2 milliGRAM(s) Oral every 12 hours  enoxaparin Injectable 40 milliGRAM(s) SubCutaneous <User Schedule>  lisinopril 10 milliGRAM(s) Oral daily  melatonin 10 milliGRAM(s) Oral at bedtime  metoprolol tartrate 50 milliGRAM(s) Oral two times a day  pantoprazole    Tablet 40 milliGRAM(s) Oral before breakfast  polyethylene glycol 3350 17 Gram(s) Oral two times a day  QUEtiapine 25 milliGRAM(s) Oral at bedtime  senna 2 Tablet(s) Oral at bedtime    MEDICATIONS  (PRN):  acetaminophen     Tablet .. 650 milliGRAM(s) Oral every 6 hours PRN Mild Pain (1 - 3)  bisacodyl 5 milliGRAM(s) Oral daily PRN Constipation  hydrALAZINE Injectable 10 milliGRAM(s) IV Push every 2 hours PRN SBP>160  QUEtiapine 25 milliGRAM(s) Oral <User Schedule> PRN agitation

## 2022-11-04 NOTE — SPEECH LANGUAGE PATHOLOGY EVALUATION - SLP DIAGNOSIS
Spontaneous speech was fluent. Assessment revealed mild-moderate fluent aphasia. Spontaneous speech was fluent but utterances were largely nonsensical. Intermittent semantic paraphasias and perseverations were present. Pt benefited from semantic cueing and field choices. Although pt was able to follow simple 1-step directives and respond to basic yes/no questions, he did demonstrate difficulties responding to more complex and open-ended questions. Responses were also impacted by stuck-in-set perseverations. Writing profile is consistent with surface agraphia. Although oral reading of high frequency words appeared to be intact, further assessment of reading skills is warranted.

## 2022-11-04 NOTE — SPEECH LANGUAGE PATHOLOGY EVALUATION - COMMENTS
Semantic paraphasias occasionally noted in written responses ("spoon"/pen). Stuck-in-set perseverations appreciated. When asked what year it was, pt perseverated on the preceding question about the month and explained, "It's not September. I would say October. February is up there too."  Poor comprehension of basic WH- questions as well as simple task directions.   When attempting to ask why she was in the hospital, pt said, "This time we were having a little lunch. Everything for me has been wonderful." Convergent Namin/5  Responses were syntactically intact but illogical in content. For example, when provided with 3 words within a category (i.e. bed, chair, table), pt said, "If there all here, this is animals. It could be that."   When given the words "Sad, happy, angry," pt explained, "Well it's said if this is what you're trying to do. It's sad."    Verbal response to describe the cookie theft picture was characterized by mostly nonsensical utterances with few coherent descriptions. Same was noted during conversation.   E.g. of utterances:   Pt explained, "He turned over the tables," when attempting to describe the little boy reaching for the cookie jar.   However, she did say, "The stool is going to tip over," which was an appropriate description.   When asked, "What is he drying (a plate)?," pt explained, "He's looking back to see what's doing." WFL Goals:    Pt will respond to egocentric/orientation WH questions with 75% accuracy given Yissel over 3 consecutive sessions.   Pt will accurately describe action pictures using S-V-O sentence structure during 75% of opportunities given ModA over 3 consecutive sessions.   Pt will write personal/orientation information with 75% accuracy given ModA over 3 consecutive sessions.

## 2022-11-05 DIAGNOSIS — D62 ACUTE POSTHEMORRHAGIC ANEMIA: ICD-10-CM

## 2022-11-05 DIAGNOSIS — R45.1 RESTLESSNESS AND AGITATION: ICD-10-CM

## 2022-11-05 LAB
ANION GAP SERPL CALC-SCNC: 8 MMOL/L — SIGNIFICANT CHANGE UP (ref 5–17)
BUN SERPL-MCNC: 22 MG/DL — SIGNIFICANT CHANGE UP (ref 7–23)
CALCIUM SERPL-MCNC: 8.8 MG/DL — SIGNIFICANT CHANGE UP (ref 8.4–10.5)
CHLORIDE SERPL-SCNC: 107 MMOL/L — SIGNIFICANT CHANGE UP (ref 96–108)
CO2 SERPL-SCNC: 26 MMOL/L — SIGNIFICANT CHANGE UP (ref 22–31)
CREAT SERPL-MCNC: 0.75 MG/DL — SIGNIFICANT CHANGE UP (ref 0.5–1.3)
EGFR: 83 ML/MIN/1.73M2 — SIGNIFICANT CHANGE UP
GLUCOSE SERPL-MCNC: 107 MG/DL — HIGH (ref 70–99)
HCT VFR BLD CALC: 30.2 % — LOW (ref 34.5–45)
HGB BLD-MCNC: 9.8 G/DL — LOW (ref 11.5–15.5)
MAGNESIUM SERPL-MCNC: 2.1 MG/DL — SIGNIFICANT CHANGE UP (ref 1.6–2.6)
MCHC RBC-ENTMCNC: 22.7 PG — LOW (ref 27–34)
MCHC RBC-ENTMCNC: 32.5 GM/DL — SIGNIFICANT CHANGE UP (ref 32–36)
MCV RBC AUTO: 70.1 FL — LOW (ref 80–100)
NRBC # BLD: 0 /100 WBCS — SIGNIFICANT CHANGE UP (ref 0–0)
PHOSPHATE SERPL-MCNC: 4.2 MG/DL — SIGNIFICANT CHANGE UP (ref 2.5–4.5)
PLATELET # BLD AUTO: 218 K/UL — SIGNIFICANT CHANGE UP (ref 150–400)
POTASSIUM SERPL-MCNC: 4.4 MMOL/L — SIGNIFICANT CHANGE UP (ref 3.5–5.3)
POTASSIUM SERPL-SCNC: 4.4 MMOL/L — SIGNIFICANT CHANGE UP (ref 3.5–5.3)
RBC # BLD: 4.31 M/UL — SIGNIFICANT CHANGE UP (ref 3.8–5.2)
RBC # FLD: 16 % — HIGH (ref 10.3–14.5)
SODIUM SERPL-SCNC: 141 MMOL/L — SIGNIFICANT CHANGE UP (ref 135–145)
WBC # BLD: 10.49 K/UL — SIGNIFICANT CHANGE UP (ref 3.8–10.5)
WBC # FLD AUTO: 10.49 K/UL — SIGNIFICANT CHANGE UP (ref 3.8–10.5)

## 2022-11-05 PROCEDURE — 99232 SBSQ HOSP IP/OBS MODERATE 35: CPT

## 2022-11-05 RX ORDER — QUETIAPINE FUMARATE 200 MG/1
12.5 TABLET, FILM COATED ORAL DAILY
Refills: 0 | Status: ACTIVE | OUTPATIENT
Start: 2022-11-06 | End: 2023-10-05

## 2022-11-05 RX ORDER — QUETIAPINE FUMARATE 200 MG/1
12.5 TABLET, FILM COATED ORAL AT BEDTIME
Refills: 0 | Status: ACTIVE | OUTPATIENT
Start: 2022-11-05 | End: 2023-10-04

## 2022-11-05 RX ADMIN — Medication 1 TABLET(S): at 17:21

## 2022-11-05 RX ADMIN — BRIVARACETAM 50 MILLIGRAM(S): 25 TABLET, FILM COATED ORAL at 06:10

## 2022-11-05 RX ADMIN — POLYETHYLENE GLYCOL 3350 17 GRAM(S): 17 POWDER, FOR SOLUTION ORAL at 06:11

## 2022-11-05 RX ADMIN — Medication 2 MILLIGRAM(S): at 17:21

## 2022-11-05 RX ADMIN — LISINOPRIL 10 MILLIGRAM(S): 2.5 TABLET ORAL at 11:57

## 2022-11-05 RX ADMIN — Medication 50 MILLIGRAM(S): at 11:57

## 2022-11-05 RX ADMIN — Medication 1 TABLET(S): at 06:11

## 2022-11-05 RX ADMIN — QUETIAPINE FUMARATE 12.5 MILLIGRAM(S): 200 TABLET, FILM COATED ORAL at 22:25

## 2022-11-05 RX ADMIN — Medication 10 MILLIGRAM(S): at 22:25

## 2022-11-05 RX ADMIN — SENNA PLUS 2 TABLET(S): 8.6 TABLET ORAL at 22:25

## 2022-11-05 RX ADMIN — Medication 2 MILLIGRAM(S): at 06:11

## 2022-11-05 RX ADMIN — Medication 50 MILLIGRAM(S): at 22:24

## 2022-11-05 RX ADMIN — PANTOPRAZOLE SODIUM 40 MILLIGRAM(S): 20 TABLET, DELAYED RELEASE ORAL at 06:11

## 2022-11-05 RX ADMIN — ENOXAPARIN SODIUM 40 MILLIGRAM(S): 100 INJECTION SUBCUTANEOUS at 22:26

## 2022-11-05 RX ADMIN — BRIVARACETAM 50 MILLIGRAM(S): 25 TABLET, FILM COATED ORAL at 17:45

## 2022-11-05 NOTE — PROGRESS NOTE ADULT - ASSESSMENT
76 yo F hx of HTN, Hyperthyroidism who presented w/ likely L frontal meningioma and is s/p bifrontal craniotomy with meningioma resection 10/27.

## 2022-11-05 NOTE — PROGRESS NOTE ADULT - PROBLEM SELECTOR PLAN 3
- in post operative period  - Improved  - Seroquel at bedtime 25mg and PRN 25mg   - Consider reducing the dose to 12.5mg at night and 12.5mg PRN given intermittent oversedation

## 2022-11-05 NOTE — PROGRESS NOTE ADULT - SUBJECTIVE AND OBJECTIVE BOX
HOSPITALIST CO-MANAGEMENT PROGRESS NOTE    SUBJECTIVE / INTERVAL HPI: Patient seen and examined at bedside. Reports she is sleepy. Does not want breakfast. Reports no pain. ROS negative.    VITAL SIGNS:  Vital Signs Last 24 Hrs  T(C): 36.5 (05 Nov 2022 12:36), Max: 36.9 (05 Nov 2022 00:48)  T(F): 97.7 (05 Nov 2022 12:36), Max: 98.5 (05 Nov 2022 00:48)  HR: 83 (05 Nov 2022 12:36) (83 - 104)  BP: 103/67 (05 Nov 2022 12:36) (100/60 - 133/74)  RR: 17 (05 Nov 2022 12:36) (16 - 18)  SpO2: 98% (05 Nov 2022 12:36) (91% - 98%)    PHYSICAL EXAM:  General: NAD  HEENT: skull incision c/d/i; anicteric sclera; MMM  Neck: supple  Cardiovascular: +S1/S2, RRR, no murmurs, rubs, gallops  Respiratory: CTA B/L; no W/R/R  Gastrointestinal: soft, NT/ND; +BSx4  Extremities: WWP; no clubbing or cyanosis, no edema  Vascular: 2+ radial and pedal pulses  Neurological: alert to self, answers yes or no questions, moves all extremties    MEDICATIONS:  MEDICATIONS  (STANDING):  amoxicillin  500 milliGRAM(s)/clavulanate 1 Tablet(s) Oral every 12 hours  benzocaine 15 mG/menthol 3.6 mG Lozenge 1 Lozenge Oral daily  brivaracetam 50 milliGRAM(s) Oral every 12 hours  dexAMETHasone     Tablet   Oral   dexAMETHasone     Tablet 2 milliGRAM(s) Oral every 12 hours  enoxaparin Injectable 40 milliGRAM(s) SubCutaneous <User Schedule>  lisinopril 10 milliGRAM(s) Oral daily  melatonin 10 milliGRAM(s) Oral at bedtime  metoprolol tartrate 50 milliGRAM(s) Oral two times a day  pantoprazole    Tablet 40 milliGRAM(s) Oral before breakfast  polyethylene glycol 3350 17 Gram(s) Oral two times a day  QUEtiapine 25 milliGRAM(s) Oral at bedtime  senna 2 Tablet(s) Oral at bedtime    MEDICATIONS  (PRN):  acetaminophen     Tablet .. 650 milliGRAM(s) Oral every 6 hours PRN Mild Pain (1 - 3)  bisacodyl 5 milliGRAM(s) Oral daily PRN Constipation  hydrALAZINE Injectable 10 milliGRAM(s) IV Push every 2 hours PRN SBP>160  QUEtiapine 25 milliGRAM(s) Oral <User Schedule> PRN agitation      ALLERGIES:  Allergies    No Known Drug Allergies  strawberry (Hives)    LABS:                        9.8    10.49 )-----------( 218      ( 05 Nov 2022 06:26 )             30.2     11-05    141  |  107  |  22  ----------------------------<  107<H>  4.4   |  26  |  0.75    Ca    8.8      05 Nov 2022 06:26  Phos  4.2     11-05  Mg     2.1     11-05    RADIOLOGY & ADDITIONAL TESTS: Reviewed.

## 2022-11-05 NOTE — PROGRESS NOTE ADULT - PROBLEM SELECTOR PLAN 1
- s/p resection 10/27  - Course complicated by agitation, now improved  - Steroids and seizure prophylaxis per primary team  - Management per primary team

## 2022-11-05 NOTE — PROGRESS NOTE ADULT - SUBJECTIVE AND OBJECTIVE BOX
HPI:  77 year old female with pmh HTN who lives in [place] presents with left frontal mass, likely meningioma. Per son, meningioma was diagnosed upon CT head performed for memory loss and confusion. Son reports that confusion has been ongoing for several months. Patient admits to mild headache but denies diplopia, blurry vision, nausea, vomiting, extremity numbness or weakness and slurred speech. Patient is preop for bifrontal craniotomy for meningioma resection 10/27.   (26 Oct 2022 10:58)    OVERNIGHT EVENTS: BALJINDER, pt confused but re-directable.     Hospital Course:   10/27: POD# 0 S/P bifrontal craniotomy for removal of skull base mass (frozen: meningioma.) Postop, Pt reports mild headache otherwise neurologically stable. Waxing/waning neuro exam. CTH stable, but with significant edema. Na 143, given 250cc 3% bolus.   10/28: POD1. CTH overnight stable. Oxycodone dc'd due to intermittent lethargy. He removed pending TOV. Failed TOV, straight cath prn.   10/29: POD2. Neuro stable. JPx2. Agitated overnight, precedex started transiently. SBP drop, precedex dc'd and 1L bolus given. Started on seroquel 12.5 at bedtime prn.  10/30: POD3, given additional 12.5 mg seroquel and placed on wrist restraints overnight for agitation. Wrist restraints removed. JUN # 2 removed. Metoprolol increased to 50 BID. Iron studies ordered for microcytic anemia.    10/31: POD4 BALJINDER overnight   11/1: POD5. patient agitated overnight attemtping to leave hospital, security at bedside. ordered 1:1 supervision. psych consulted, recc 10mg melatonin qd, seroquel 100mg BID and zyprexa up to 30mg/day. patient on restraints: b/l wrist/mittens, and soft vest./98 this morning hydral given, pending nutrition consult, upgraded to ICU for decreased level of arousal   11/2: POD 6 mening resection, ICU overnight, mental status improved but not fully back to post op baseline per son. CTH in AM stable, moved to SDU. Seroquel dose decreased per  reccomendations.   11/3: POD 7 meningioma resection. mental status improving.   11/4: POD 8 meningioma resection   11/5: POD 9 meningioma resection. Staples in place. BALJINDER overnight.     Vital Signs Last 24 Hrs  T(C): 36.9 (05 Nov 2022 00:48), Max: 36.9 (05 Nov 2022 00:48)  T(F): 98.5 (05 Nov 2022 00:48), Max: 98.5 (05 Nov 2022 00:48)  HR: 93 (05 Nov 2022 00:48) (72 - 104)  BP: 100/60 (05 Nov 2022 00:48) (100/60 - 133/74)  BP(mean): --  RR: 16 (05 Nov 2022 00:48) (16 - 18)  SpO2: 96% (05 Nov 2022 00:48) (91% - 96%)    Parameters below as of 05 Nov 2022 00:48  Patient On (Oxygen Delivery Method): room air        I&O's Summary    03 Nov 2022 07:01  -  04 Nov 2022 07:00  --------------------------------------------------------  IN: 0 mL / OUT: 2950 mL / NET: -2950 mL    04 Nov 2022 07:01  -  05 Nov 2022 02:41  --------------------------------------------------------  IN: 0 mL / OUT: 1100 mL / NET: -1100 mL        PHYSICAL EXAM:  General: patient seen laying supine in bed in NAD  Neuro: AAOx1, FC, OE spontaneously, confused speech, CNII-XI grossly intact, face symmetric, no pronator drift, strength 5/5 b/l UE and LE, sensation grossly intact to light touch throughout  HEENT: PERRL, EOMI  Neck: supple  Cardiac: RRR, S1S2  Pulmonary: chest rise symmetric  Abdomen: soft, nontender, nondistended  Ext: perfusing well  Skin: warm, dry  Wound: bifrontal crani incision c/d/i with staples open to air    TUBES/LINES:  [] He  [] Lumbar Drain  [] Wound Drains  [] Others      DIET:  [] NPO  [x] Mechanical  [] Tube feeds    LABS:                        10.7   11.64 )-----------( 236      ( 04 Nov 2022 08:50 )             32.6     11-04    142  |  108  |  20  ----------------------------<  101<H>  4.0   |  26  |  0.67    Ca    9.0      04 Nov 2022 08:50  Phos  3.2     11-04  Mg     1.9     11-04              CAPILLARY BLOOD GLUCOSE          Drug Levels: [] N/A    CSF Analysis: [] N/A      Allergies    No Known Drug Allergies  strawberry (Hives)    Intolerances      MEDICATIONS:  Antibiotics:  amoxicillin  500 milliGRAM(s)/clavulanate 1 Tablet(s) Oral every 12 hours    Neuro:  acetaminophen     Tablet .. 650 milliGRAM(s) Oral every 6 hours PRN  brivaracetam 50 milliGRAM(s) Oral every 12 hours  melatonin 10 milliGRAM(s) Oral at bedtime  QUEtiapine 25 milliGRAM(s) Oral at bedtime  QUEtiapine 25 milliGRAM(s) Oral <User Schedule> PRN    Anticoagulation:  enoxaparin Injectable 40 milliGRAM(s) SubCutaneous <User Schedule>    OTHER:  benzocaine 15 mG/menthol 3.6 mG Lozenge 1 Lozenge Oral daily  bisacodyl 5 milliGRAM(s) Oral daily PRN  dexAMETHasone     Tablet   Oral   dexAMETHasone     Tablet 2 milliGRAM(s) Oral every 12 hours  hydrALAZINE Injectable 10 milliGRAM(s) IV Push every 2 hours PRN  lisinopril 10 milliGRAM(s) Oral daily  metoprolol tartrate 50 milliGRAM(s) Oral two times a day  pantoprazole    Tablet 40 milliGRAM(s) Oral before breakfast  polyethylene glycol 3350 17 Gram(s) Oral two times a day  senna 2 Tablet(s) Oral at bedtime    IVF:    CULTURES:    RADIOLOGY & ADDITIONAL TESTS:      ASSESSMENT:  77 year old female with pmh HTN found to have left frontal brain mass, likely meningioma, on workup for confusion and memory loss. Patient is now s/p bifrontal craniotomy for meningioma resection (frozen: meningioma) 10/27/22.     HEADACHE    Handoff    MEWS Score    Meningioma    Hypertension    H/O hyperthyroidism    Brain tumor    Brain tumor    Metabolic encephalopathy    Tension type headache    Meningioma    Hypertension    Preoperative testing    Preoperative examination    Prophylactic measure    Craniotomy for meningioma    Status post thyroid surgery    HEADACHE    Hypertension    [email or web address]        PLAN:  Neuro:  - neuro checks/vital signs q4  - pain control PRN  - Bivirat 50 BID   - decadron 4q6 x3d then taper over 1 week  - postop MRI brain done 10/30  - CTH 10/27 and 11/1, 11/2 - stable, significant edema  - agitation: zyprexa PRN, seroquel 25mg AM PRN, 25mg at bedtime, melatonin 10mg qhs     Cardiac:  - -140  - cont home lisinopril 10mg qd   - Increased home metoprolol to 50mg BID 10/30    Pulmonary:  - on RA, IS    GI:  - Regular diet  - bowel regimen  - PPI while on steroids    Renal:  - voiding  - straight cath PRN     Heme:  - SCDs, SQL   - LE dopplers 10/26: neg for DVT  - Microcytic anemia: f/u iron studies     Endo:  - A1C 5.1, no issues    ID:  - afebrile, trend leukocytosis  - augmentin x 10 days (10/31-11/10) per Roseline     Disposition: SDU status, full code, PT/OT recs AR, Family updated with plan    Assessment and Plan d/w Dr. Garnica         Assessment:  Present when checked    []  GCS  E   V  M     Heart Failure: []Acute, [] acute on chronic , []chronic  Heart Failure:  [] Diastolic (HFpEF), [] Systolic (HFrEF), []Combined (HFpEF and HFrEF), [] RHF, [] Pulm HTN, [] Other    [] OZZIE, [] ATN, [] AIN, [] other  [] CKD1, [] CKD2, [] CKD 3, [] CKD 4, [] CKD 5, []ESRD    Encephalopathy: [] Metabolic, [] Hepatic, [] toxic, [] Neurological, [] Other    Abnormal Nurtitional Status: [] malnurtition (see nutrition note), [ ]underweight: BMI < 19, [] morbid obesity: BMI >40, [] Cachexia    [] Sepsis  [] hypovolemic shock,[] cardiogenic shock, [] hemorrhagic shock, [] neuogenic shock  [] Acute Respiratory Failure  []Cerebral edema, [] Brain compression/ herniation,   [] Functional quadriplegia  [] Acute blood loss anemia

## 2022-11-06 PROCEDURE — 99024 POSTOP FOLLOW-UP VISIT: CPT

## 2022-11-06 PROCEDURE — 99232 SBSQ HOSP IP/OBS MODERATE 35: CPT

## 2022-11-06 RX ADMIN — QUETIAPINE FUMARATE 12.5 MILLIGRAM(S): 200 TABLET, FILM COATED ORAL at 21:51

## 2022-11-06 RX ADMIN — Medication 1 TABLET(S): at 06:34

## 2022-11-06 RX ADMIN — POLYETHYLENE GLYCOL 3350 17 GRAM(S): 17 POWDER, FOR SOLUTION ORAL at 06:34

## 2022-11-06 RX ADMIN — LISINOPRIL 10 MILLIGRAM(S): 2.5 TABLET ORAL at 06:32

## 2022-11-06 RX ADMIN — ENOXAPARIN SODIUM 40 MILLIGRAM(S): 100 INJECTION SUBCUTANEOUS at 21:52

## 2022-11-06 RX ADMIN — Medication 50 MILLIGRAM(S): at 21:51

## 2022-11-06 RX ADMIN — Medication 1 TABLET(S): at 17:16

## 2022-11-06 RX ADMIN — BRIVARACETAM 50 MILLIGRAM(S): 25 TABLET, FILM COATED ORAL at 06:34

## 2022-11-06 RX ADMIN — QUETIAPINE FUMARATE 12.5 MILLIGRAM(S): 200 TABLET, FILM COATED ORAL at 19:05

## 2022-11-06 RX ADMIN — Medication 2 MILLIGRAM(S): at 06:32

## 2022-11-06 RX ADMIN — Medication 10 MILLIGRAM(S): at 21:52

## 2022-11-06 RX ADMIN — Medication 50 MILLIGRAM(S): at 11:07

## 2022-11-06 RX ADMIN — PANTOPRAZOLE SODIUM 40 MILLIGRAM(S): 20 TABLET, DELAYED RELEASE ORAL at 06:32

## 2022-11-06 RX ADMIN — BRIVARACETAM 50 MILLIGRAM(S): 25 TABLET, FILM COATED ORAL at 17:16

## 2022-11-06 NOTE — PROGRESS NOTE ADULT - ASSESSMENT
78 yo F hx of HTN, Hyperthyroidism who presented w/ likely L frontal meningioma and is s/p bifrontal craniotomy with meningioma resection 10/27.

## 2022-11-06 NOTE — PROGRESS NOTE ADULT - SUBJECTIVE AND OBJECTIVE BOX
HOSPITALIST CO-MANAGEMENT PROGRESS NOTE    SUBJECTIVE / INTERVAL HPI: Patient seen and examined at bedside. Alert, oriented. ROS negative.    VITAL SIGNS:  Vital Signs Last 24 Hrs  T(C): 36.1 (06 Nov 2022 08:14), Max: 37.1 (05 Nov 2022 16:38)  T(F): 97 (06 Nov 2022 08:14), Max: 98.8 (05 Nov 2022 16:38)  HR: 104 (06 Nov 2022 12:28) (74 - 104)  BP: 113/69 (06 Nov 2022 12:28) (105/69 - 136/72)  RR: 18 (06 Nov 2022 12:28) (12 - 18)  SpO2: 95% (06 Nov 2022 12:28) (93% - 97%)    PHYSICAL EXAM:  General: NAD  HEENT: skull incision c/d/i; anicteric sclera; MMM  Neck: supple  Cardiovascular: +S1/S2, RRR, no murmurs, rubs, gallops  Respiratory: CTA B/L; no W/R/R  Gastrointestinal: soft, NT/ND; +BSx4  Extremities: WWP; no clubbing or cyanosis, no edema  Vascular: 2+ radial and pedal pulses  Neurological: alert and oriented to self and location, no focal deficits    MEDICATIONS:  MEDICATIONS  (STANDING):  amoxicillin  500 milliGRAM(s)/clavulanate 1 Tablet(s) Oral every 12 hours  benzocaine 15 mG/menthol 3.6 mG Lozenge 1 Lozenge Oral daily  brivaracetam 50 milliGRAM(s) Oral every 12 hours  enoxaparin Injectable 40 milliGRAM(s) SubCutaneous <User Schedule>  lisinopril 10 milliGRAM(s) Oral daily  melatonin 10 milliGRAM(s) Oral at bedtime  metoprolol tartrate 50 milliGRAM(s) Oral two times a day  pantoprazole    Tablet 40 milliGRAM(s) Oral before breakfast  polyethylene glycol 3350 17 Gram(s) Oral two times a day  QUEtiapine 12.5 milliGRAM(s) Oral at bedtime  senna 2 Tablet(s) Oral at bedtime    MEDICATIONS  (PRN):  acetaminophen     Tablet .. 650 milliGRAM(s) Oral every 6 hours PRN Mild Pain (1 - 3)  bisacodyl 5 milliGRAM(s) Oral daily PRN Constipation  hydrALAZINE Injectable 10 milliGRAM(s) IV Push every 2 hours PRN SBP>160  QUEtiapine 12.5 milliGRAM(s) Oral daily PRN agitation      ALLERGIES:  Allergies    No Known Drug Allergies  strawberry (Hives)    Intolerances        LABS:                        9.8    10.49 )-----------( 218      ( 05 Nov 2022 06:26 )             30.2     11-05    141  |  107  |  22  ----------------------------<  107<H>  4.4   |  26  |  0.75    Ca    8.8      05 Nov 2022 06:26  Phos  4.2     11-05  Mg     2.1     11-05    RADIOLOGY & ADDITIONAL TESTS: Reviewed.

## 2022-11-06 NOTE — PROGRESS NOTE ADULT - PROBLEM SELECTOR PLAN 3
- in post operative period  - Seroquel at bedtime 12.5mg and PRN 12.5mg - mental status significantly improved with this lower dose

## 2022-11-06 NOTE — PROGRESS NOTE ADULT - SUBJECTIVE AND OBJECTIVE BOX
HPI:  77 year old female with pmh HTN who lives in [place] presents with left frontal mass, likely meningioma. Per son, meningioma was diagnosed upon CT head performed for memory loss and confusion. Son reports that confusion has been ongoing for several months. Patient admits to mild headache but denies diplopia, blurry vision, nausea, vomiting, extremity numbness or weakness and slurred speech. Patient is preop for bifrontal craniotomy for meningioma resection 10/27.   (26 Oct 2022 10:58)    OVERNIGHT EVENTS: BALJINDER    Hospital Course:   10/27: POD# 0 S/P bifrontal craniotomy for removal of skull base mass (frozen: meningioma.) Postop, Pt reports mild headache otherwise neurologically stable. Waxing/waning neuro exam. CTH stable, but with significant edema. Na 143, given 250cc 3% bolus.   10/28: POD1. CTH overnight stable. Oxycodone dc'd due to intermittent lethargy. He removed pending TOV. Failed TOV, straight cath prn.   10/29: POD2. Neuro stable. JPx2. Agitated overnight, precedex started transiently. SBP drop, precedex dc'd and 1L bolus given. Started on seroquel 12.5 at bedtime prn.  10/30: POD3, given additional 12.5 mg seroquel and placed on wrist restraints overnight for agitation. Wrist restraints removed. JUN # 2 removed. Metoprolol increased to 50 BID. Iron studies ordered for microcytic anemia.    10/31: POD4 BALJINDER overnight   11/1: POD5. patient agitated overnight attemtping to leave hospital, security at bedside. ordered 1:1 supervision. psych consulted, recc 10mg melatonin qd, seroquel 100mg BID and zyprexa up to 30mg/day. patient on restraints: b/l wrist/mittens, and soft vest./98 this morning hydral given, pending nutrition consult, upgraded to ICU for decreased level of arousal   11/2: POD 6 mening resection, ICU overnight, mental status improved but not fully back to post op baseline per son. CTH in AM stable, moved to SDU. Seroquel dose decreased per  reccomendations.   11/3: POD 7 meningioma resection. mental status improving.   11/4: POD 8 meningioma resection   11/5: POD 9 meningioma resection. Staples in place. BALJINDER overnight.   11/6: POD 10 resection. BALJINDER overnight. Pending psych consult.     Vital Signs Last 24 Hrs  T(C): 36.5 (05 Nov 2022 22:19), Max: 37.1 (05 Nov 2022 16:38)  T(F): 97.7 (05 Nov 2022 22:19), Max: 98.8 (05 Nov 2022 16:38)  HR: 92 (05 Nov 2022 22:19) (83 - 92)  BP: 136/72 (05 Nov 2022 22:19) (102/57 - 136/72)  BP(mean): --  RR: 16 (05 Nov 2022 22:19) (16 - 18)  SpO2: 94% (05 Nov 2022 22:19) (94% - 98%)    Parameters below as of 05 Nov 2022 22:19  Patient On (Oxygen Delivery Method): room air        I&O's Summary    04 Nov 2022 07:01  -  05 Nov 2022 07:00  --------------------------------------------------------  IN: 0 mL / OUT: 1900 mL / NET: -1900 mL    05 Nov 2022 08:01  -  06 Nov 2022 01:05  --------------------------------------------------------  IN: 0 mL / OUT: 1800 mL / NET: -1800 mL        PHYSICAL EXAM:  General: patient seen laying supine in bed in NAD  Neuro: AAOx2 (self, hospital), FC, OE spontaneously, speech confused, CNII-XI grossly intact, face symmetric, no pronator drift, strength 5/5 b/l UE and LE, sensation grossly intact to light touch throughout  HEENT: PERRL, EOMI  Neck: supple  Cardiac: RRR, S1S2  Pulmonary: chest rise symmetric  Abdomen: soft, nontender, nondistended  Ext: perfusing well  Skin: warm, dry  Wound: bicoronal incision c/d/i + stapled    TUBES/LINES:  [] He  [] Lumbar Drain  [] Wound Drains  [] Others      DIET:  [] NPO  [x] Mechanical  [] Tube feeds    LABS:                        9.8    10.49 )-----------( 218      ( 05 Nov 2022 06:26 )             30.2     11-05    141  |  107  |  22  ----------------------------<  107<H>  4.4   |  26  |  0.75    Ca    8.8      05 Nov 2022 06:26  Phos  4.2     11-05  Mg     2.1     11-05              CAPILLARY BLOOD GLUCOSE          Drug Levels: [] N/A    CSF Analysis: [] N/A      Allergies    No Known Drug Allergies  strawberry (Hives)    Intolerances      MEDICATIONS:  Antibiotics:  amoxicillin  500 milliGRAM(s)/clavulanate 1 Tablet(s) Oral every 12 hours    Neuro:  acetaminophen     Tablet .. 650 milliGRAM(s) Oral every 6 hours PRN  brivaracetam 50 milliGRAM(s) Oral every 12 hours  melatonin 10 milliGRAM(s) Oral at bedtime  QUEtiapine 12.5 milliGRAM(s) Oral at bedtime  QUEtiapine 12.5 milliGRAM(s) Oral daily PRN    Anticoagulation:  enoxaparin Injectable 40 milliGRAM(s) SubCutaneous <User Schedule>    OTHER:  benzocaine 15 mG/menthol 3.6 mG Lozenge 1 Lozenge Oral daily  bisacodyl 5 milliGRAM(s) Oral daily PRN  dexAMETHasone     Tablet   Oral   dexAMETHasone     Tablet 2 milliGRAM(s) Oral every 12 hours  hydrALAZINE Injectable 10 milliGRAM(s) IV Push every 2 hours PRN  lisinopril 10 milliGRAM(s) Oral daily  metoprolol tartrate 50 milliGRAM(s) Oral two times a day  pantoprazole    Tablet 40 milliGRAM(s) Oral before breakfast  polyethylene glycol 3350 17 Gram(s) Oral two times a day  senna 2 Tablet(s) Oral at bedtime    IVF:    CULTURES:    RADIOLOGY & ADDITIONAL TESTS:      ASSESSMENT:  77 year old female with pmh HTN found to have left frontal brain mass, likely meningioma, on workup for confusion and memory loss. Patient is now s/p bifrontal craniotomy for meningioma resection (frozen: meningioma) 10/27/22    HEADACHE    Handoff    MEWS Score    Meningioma    Hypertension    H/O hyperthyroidism    Brain tumor    Brain tumor    Metabolic encephalopathy    Tension type headache    Meningioma    Hypertension    Preoperative testing    Preoperative examination    Prophylactic measure    Agitation    Anemia due to acute blood loss    Craniotomy for meningioma    Status post thyroid surgery    HEADACHE    Hypertension    [email or web address]        PLAN:  Neuro:  - neuro checks/vital signs q4  - pain control PRN  - Bivirat 50 BID   - decadron 4q6 x3d then taper over 1 week  - postop MRI brain done 10/30  - CTH 10/27 and 11/1, 11/2 - stable, significant edema  - agitation: zyprexa PRN, seroquel 12.5mg AM PRN, 12.5mg at bedtime, melatonin 10mg qhs     Cardiac:  - -140  - cont home lisinopril 10mg qd   - Increased home metoprolol to 50mg BID 10/30    Pulmonary:  - on RA, IS    GI:  - Regular diet  - bowel regimen  - PPI while on steroids    Renal:  - voiding  - straight cath PRN     Heme:  - SCDs, SQL   - LE dopplers 10/26: neg for DVT  - Microcytic anemia: f/u iron studies     Endo:  - A1C 5.1, no issues    ID:  - afebrile, trend leukocytosis  - augmentin x 10 days (10/31-11/10) per Roseline     Disposition: SDU status, full code, PT/OT recs AR, Family updated with plan    Assessment and Plan d/w Dr. Garnica         Assessment:  Present when checked    []  GCS  E   V  M     Heart Failure: []Acute, [] acute on chronic , []chronic  Heart Failure:  [] Diastolic (HFpEF), [] Systolic (HFrEF), []Combined (HFpEF and HFrEF), [] RHF, [] Pulm HTN, [] Other    [] OZZIE, [] ATN, [] AIN, [] other  [] CKD1, [] CKD2, [] CKD 3, [] CKD 4, [] CKD 5, []ESRD    Encephalopathy: [] Metabolic, [] Hepatic, [] toxic, [] Neurological, [] Other    Abnormal Nurtitional Status: [] malnurtition (see nutrition note), [ ]underweight: BMI < 19, [] morbid obesity: BMI >40, [] Cachexia    [] Sepsis  [] hypovolemic shock,[] cardiogenic shock, [] hemorrhagic shock, [] neuogenic shock  [] Acute Respiratory Failure  []Cerebral edema, [] Brain compression/ herniation,   [] Functional quadriplegia  [] Acute blood loss anemia

## 2022-11-07 ENCOUNTER — NON-APPOINTMENT (OUTPATIENT)
Age: 77
End: 2022-11-07

## 2022-11-07 DIAGNOSIS — I95.9 HYPOTENSION, UNSPECIFIED: ICD-10-CM

## 2022-11-07 LAB
ANION GAP SERPL CALC-SCNC: 13 MMOL/L — SIGNIFICANT CHANGE UP (ref 5–17)
ANION GAP SERPL CALC-SCNC: 7 MMOL/L — SIGNIFICANT CHANGE UP (ref 5–17)
BUN SERPL-MCNC: 22 MG/DL — SIGNIFICANT CHANGE UP (ref 7–23)
BUN SERPL-MCNC: 23 MG/DL — SIGNIFICANT CHANGE UP (ref 7–23)
CALCIUM SERPL-MCNC: 9 MG/DL — SIGNIFICANT CHANGE UP (ref 8.4–10.5)
CALCIUM SERPL-MCNC: 9.1 MG/DL — SIGNIFICANT CHANGE UP (ref 8.4–10.5)
CHLORIDE SERPL-SCNC: 105 MMOL/L — SIGNIFICANT CHANGE UP (ref 96–108)
CHLORIDE SERPL-SCNC: 106 MMOL/L — SIGNIFICANT CHANGE UP (ref 96–108)
CK MB CFR SERPL CALC: 4.3 NG/ML — SIGNIFICANT CHANGE UP (ref 0–6.7)
CK SERPL-CCNC: 120 U/L — SIGNIFICANT CHANGE UP (ref 25–170)
CO2 SERPL-SCNC: 23 MMOL/L — SIGNIFICANT CHANGE UP (ref 22–31)
CO2 SERPL-SCNC: 27 MMOL/L — SIGNIFICANT CHANGE UP (ref 22–31)
CREAT SERPL-MCNC: 0.8 MG/DL — SIGNIFICANT CHANGE UP (ref 0.5–1.3)
CREAT SERPL-MCNC: 0.96 MG/DL — SIGNIFICANT CHANGE UP (ref 0.5–1.3)
EGFR: 62 ML/MIN/1.73M2 — SIGNIFICANT CHANGE UP
EGFR: 77 ML/MIN/1.73M2 — SIGNIFICANT CHANGE UP
GLUCOSE BLDC GLUCOMTR-MCNC: 118 MG/DL — HIGH (ref 70–99)
GLUCOSE SERPL-MCNC: 119 MG/DL — HIGH (ref 70–99)
GLUCOSE SERPL-MCNC: 90 MG/DL — SIGNIFICANT CHANGE UP (ref 70–99)
HCT VFR BLD CALC: 32.1 % — LOW (ref 34.5–45)
HGB BLD-MCNC: 10.3 G/DL — LOW (ref 11.5–15.5)
LACTATE SERPL-SCNC: 1.2 MMOL/L — SIGNIFICANT CHANGE UP (ref 0.5–2)
LACTATE SERPL-SCNC: 3.5 MMOL/L — HIGH (ref 0.5–2)
MAGNESIUM SERPL-MCNC: 2 MG/DL — SIGNIFICANT CHANGE UP (ref 1.6–2.6)
MCHC RBC-ENTMCNC: 22.3 PG — LOW (ref 27–34)
MCHC RBC-ENTMCNC: 32.1 GM/DL — SIGNIFICANT CHANGE UP (ref 32–36)
MCV RBC AUTO: 69.6 FL — LOW (ref 80–100)
NRBC # BLD: 0 /100 WBCS — SIGNIFICANT CHANGE UP (ref 0–0)
PHOSPHATE SERPL-MCNC: 3.7 MG/DL — SIGNIFICANT CHANGE UP (ref 2.5–4.5)
PLATELET # BLD AUTO: 245 K/UL — SIGNIFICANT CHANGE UP (ref 150–400)
POTASSIUM SERPL-MCNC: 3.9 MMOL/L — SIGNIFICANT CHANGE UP (ref 3.5–5.3)
POTASSIUM SERPL-MCNC: 4 MMOL/L — SIGNIFICANT CHANGE UP (ref 3.5–5.3)
POTASSIUM SERPL-SCNC: 3.9 MMOL/L — SIGNIFICANT CHANGE UP (ref 3.5–5.3)
POTASSIUM SERPL-SCNC: 4 MMOL/L — SIGNIFICANT CHANGE UP (ref 3.5–5.3)
RBC # BLD: 4.61 M/UL — SIGNIFICANT CHANGE UP (ref 3.8–5.2)
RBC # FLD: 16.9 % — HIGH (ref 10.3–14.5)
SODIUM SERPL-SCNC: 139 MMOL/L — SIGNIFICANT CHANGE UP (ref 135–145)
SODIUM SERPL-SCNC: 142 MMOL/L — SIGNIFICANT CHANGE UP (ref 135–145)
TROPONIN T SERPL-MCNC: 0.01 NG/ML — SIGNIFICANT CHANGE UP (ref 0–0.01)
TROPONIN T SERPL-MCNC: 0.03 NG/ML — HIGH (ref 0–0.01)
WBC # BLD: 11.55 K/UL — HIGH (ref 3.8–10.5)
WBC # FLD AUTO: 11.55 K/UL — HIGH (ref 3.8–10.5)

## 2022-11-07 PROCEDURE — 99233 SBSQ HOSP IP/OBS HIGH 50: CPT

## 2022-11-07 RX ORDER — SODIUM CHLORIDE 9 MG/ML
1000 INJECTION INTRAMUSCULAR; INTRAVENOUS; SUBCUTANEOUS ONCE
Refills: 0 | Status: COMPLETED | OUTPATIENT
Start: 2022-11-07 | End: 2022-11-07

## 2022-11-07 RX ADMIN — Medication 10 MILLIGRAM(S): at 23:54

## 2022-11-07 RX ADMIN — SENNA PLUS 2 TABLET(S): 8.6 TABLET ORAL at 21:29

## 2022-11-07 RX ADMIN — Medication 1 TABLET(S): at 18:16

## 2022-11-07 RX ADMIN — QUETIAPINE FUMARATE 12.5 MILLIGRAM(S): 200 TABLET, FILM COATED ORAL at 23:54

## 2022-11-07 RX ADMIN — LISINOPRIL 10 MILLIGRAM(S): 2.5 TABLET ORAL at 05:55

## 2022-11-07 RX ADMIN — SODIUM CHLORIDE 1000 MILLILITER(S): 9 INJECTION INTRAMUSCULAR; INTRAVENOUS; SUBCUTANEOUS at 12:27

## 2022-11-07 RX ADMIN — SODIUM CHLORIDE 1000 MILLILITER(S): 9 INJECTION INTRAMUSCULAR; INTRAVENOUS; SUBCUTANEOUS at 10:30

## 2022-11-07 RX ADMIN — BRIVARACETAM 50 MILLIGRAM(S): 25 TABLET, FILM COATED ORAL at 05:56

## 2022-11-07 RX ADMIN — POLYETHYLENE GLYCOL 3350 17 GRAM(S): 17 POWDER, FOR SOLUTION ORAL at 18:16

## 2022-11-07 RX ADMIN — PANTOPRAZOLE SODIUM 40 MILLIGRAM(S): 20 TABLET, DELAYED RELEASE ORAL at 05:55

## 2022-11-07 RX ADMIN — BRIVARACETAM 50 MILLIGRAM(S): 25 TABLET, FILM COATED ORAL at 18:16

## 2022-11-07 RX ADMIN — Medication 650 MILLIGRAM(S): at 06:37

## 2022-11-07 RX ADMIN — Medication 650 MILLIGRAM(S): at 07:07

## 2022-11-07 RX ADMIN — BENZOCAINE AND MENTHOL 1 LOZENGE: 5; 1 LIQUID ORAL at 15:34

## 2022-11-07 RX ADMIN — Medication 50 MILLIGRAM(S): at 21:29

## 2022-11-07 RX ADMIN — ENOXAPARIN SODIUM 40 MILLIGRAM(S): 100 INJECTION SUBCUTANEOUS at 21:29

## 2022-11-07 RX ADMIN — Medication 1 TABLET(S): at 05:55

## 2022-11-07 NOTE — PROGRESS NOTE ADULT - SUBJECTIVE AND OBJECTIVE BOX
SUBJECTIVE:  Patient seen and examined at bedside.  At her baseline A&Ox2 but tangential.  Complaint of depressed mood over the weekend.  All other ROS negative    Patient later was a rapid response for period of decreased responsiveness after showering.  Vital signs notable for BP 90s/60s, mental status quickly recovered. No complaint of chest pain, SOB.  Labs repeated and lactate 3.5, Trop 0.03.  EKG NSR without ST elevations.    Patient seen again in afternoon s/p PT.  Feeling well, no acute complaints, continues to deny CP, SOB.  Worked well with PT    ROS:  All other ROS negative except as above    Vital Signs Last 12 Hrs  T(F): 97.6 (11-07-22 @ 11:16), Max: 97.8 (11-07-22 @ 08:07)  HR: 90 (11-07-22 @ 14:20) (72 - 90)  BP: 110/71 (11-07-22 @ 14:20) (100/56 - 118/62)  BP(mean): --  RR: 18 (11-07-22 @ 14:20) (17 - 18)  SpO2: 98% (11-07-22 @ 14:20) (96% - 98%)  I&O's Summary    06 Nov 2022 07:01  -  07 Nov 2022 07:00  --------------------------------------------------------  IN: 720 mL / OUT: 0 mL / NET: 720 mL    07 Nov 2022 07:01  -  07 Nov 2022 15:10  --------------------------------------------------------  IN: 1000 mL / OUT: 0 mL / NET: 1000 mL        PHYSICAL EXAM:  Constitutional: NAD, comfortable in bed.  HEENT: PERRLA, EOMI, MMM, crani incision c/d/i  Neck: Supple  Respiratory: CTA B/L. No w/r/r.   Cardiovascular: RRR, normal S1 and S2, no m/r/g.   Gastrointestinal: +BS, soft NTND, no guarding or rebound tenderness, no palpable masses   Extremities: wwp; no cyanosis, clubbing or edema.   Vascular: Pulses equal and strong throughout.   Neurological: AAOx2, tangential, thinks she is at a spa sometimes, no CN deficits, strength and sensation intact throughout.   Skin: No gross skin abnormalities or rashes        LABS:                        10.3   11.55 )-----------( 245      ( 07 Nov 2022 07:48 )             32.1     11-07    142  |  106  |  23  ----------------------------<  119<H>  4.0   |  23  |  0.96    Ca    9.1      07 Nov 2022 11:07  Phos  3.7     11-07  Mg     2.0     11-07              RADIOLOGY & ADDITIONAL TESTS:  No new imaging    MEDICATIONS  (STANDING):  amoxicillin  500 milliGRAM(s)/clavulanate 1 Tablet(s) Oral every 12 hours  benzocaine 15 mG/menthol 3.6 mG Lozenge 1 Lozenge Oral daily  brivaracetam 50 milliGRAM(s) Oral every 12 hours  enoxaparin Injectable 40 milliGRAM(s) SubCutaneous <User Schedule>  lisinopril 10 milliGRAM(s) Oral daily  melatonin 10 milliGRAM(s) Oral at bedtime  metoprolol tartrate 50 milliGRAM(s) Oral two times a day  pantoprazole    Tablet 40 milliGRAM(s) Oral before breakfast  polyethylene glycol 3350 17 Gram(s) Oral two times a day  QUEtiapine 12.5 milliGRAM(s) Oral at bedtime  senna 2 Tablet(s) Oral at bedtime    MEDICATIONS  (PRN):  acetaminophen     Tablet .. 650 milliGRAM(s) Oral every 6 hours PRN Mild Pain (1 - 3)  bisacodyl 5 milliGRAM(s) Oral daily PRN Constipation  hydrALAZINE Injectable 10 milliGRAM(s) IV Push every 2 hours PRN SBP>160  QUEtiapine 12.5 milliGRAM(s) Oral daily PRN agitation

## 2022-11-07 NOTE — CHART NOTE - NSCHARTNOTEFT_GEN_A_CORE
Pt was in the bathroom brushing her teeth with her daughter with her when she suddenly felt light headed and became unresponsive, the nurse called a Rapid response and patient was brought back to the bed. Vitals were assessed at this time, FS was normal 118, pt was satting 95 on room air, BP was in the systolic 90's, 1 L normal saline was given. Pt returned to her neurologic baseline of AOx1 (self). EKG obtained, BMP/lactate/cardiac enzymes drawn. Pt remains in bed with 1 L bolus over a pressure bag. Dr. Garnica was notified and the hospitalist Dr. Crzu was at bedside.

## 2022-11-07 NOTE — PROVIDER CONTACT NOTE (CRITICAL VALUE NOTIFICATION) - ASSESSMENT
Pt had a rapid response called for extreme dizziness, lethargy and increased confusion. Pt's labs were drawn during RR and lactate level is elevated to 3.5. Pt remained A&O1-2 (baseline) and afebrile. VS= 97.6, 75, 100/56, 98%. Pt now back in bed and stable.

## 2022-11-07 NOTE — PROGRESS NOTE ADULT - SUBJECTIVE AND OBJECTIVE BOX
HPI:  77 year old female with pmh HTN who lives in [place] presents with left frontal mass, likely meningioma. Per son, meningioma was diagnosed upon CT head performed for memory loss and confusion. Son reports that confusion has been ongoing for several months. Patient admits to mild headache but denies diplopia, blurry vision, nausea, vomiting, extremity numbness or weakness and slurred speech. Patient is preop for bifrontal craniotomy for meningioma resection 10/27.   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 Waxing/waning neuro exam. CTH stable, but with significant edema. Na 143, given 250cc 3% bolus.   10/28: POD1. CTH overnight stable. Oxycodone dc'd due to intermittent lethargy. He removed pending TOV. Failed TOV, straight cath prn.   10/29: POD2. Neuro stable. JPx2. Agitated overnight, precedex started transiently. SBP drop, precedex dc'd and 1L bolus given. Started on seroquel 12.5 at bedtime prn.  10/30: POD3, given additional 12.5 mg seroquel and placed on wrist restraints overnight for agitation. Wrist restraints removed. JUN # 2 removed. Metoprolol increased to 50 BID. Iron studies ordered for microcytic anemia.    10/31: POD4 HEATHER overnight   11/1: POD5. patient agitated overnight attemtping to leave hospital, security at bedside. ordered 1:1 supervision. psych consulted, recc 10mg melatonin qd, seroquel 100mg BID and zyprexa up to 30mg/day. patient on restraints: b/l wrist/mittens, and soft vest./98 this morning hydral given, pending nutrition consult, upgraded to ICU for decreased level of arousal   11/2: POD 6 mening resection, ICU overnight, mental status improved but not fully back to post op baseline per son. CTH in AM stable, moved to SDU. Seroquel dose decreased per  reccomendations.   11/3: POD 7 meningioma resection. mental status improving.   11/4: POD 8 meningioma resection   11/5: POD 9 meningioma resection. Staples in place. HEATHER overnight.   11/6: POD 10 resection. HEATHER overnight.     OVERNIGHT EVENTS: heather overnight   Vital Signs Last 24 Hrs  T(C): 36.8 (07 Nov 2022 00:30), Max: 36.8 (07 Nov 2022 00:30)  T(F): 98.2 (07 Nov 2022 00:30), Max: 98.2 (07 Nov 2022 00:30)  HR: 86 (07 Nov 2022 00:30) (74 - 104)  BP: 114/70 (07 Nov 2022 00:30) (105/69 - 126/64)  BP(mean): --  RR: 18 (07 Nov 2022 00:30) (12 - 18)  SpO2: 94% (07 Nov 2022 00:30) (93% - 96%)    Parameters below as of 07 Nov 2022 00:30  Patient On (Oxygen Delivery Method): room air        I&O's Summary    05 Nov 2022 08:01  -  06 Nov 2022 07:00  --------------------------------------------------------  IN: 0 mL / OUT: 2700 mL / NET: -2700 mL    06 Nov 2022 07:01  -  07 Nov 2022 01:39  --------------------------------------------------------  IN: 720 mL / OUT: 0 mL / NET: 720 mL      PHYSICAL EXAM:  General: patient seen laying supine in bed in NAD  Neuro: AAOx2 (self, hospital), FC, OE spontaneously, speech confused, CNII-XI grossly intact, face symmetric, no pronator drift, strength 5/5 b/l UE and LE, sensation grossly intact to light touch throughout  HEENT: PERRL, EOMI  Neck: supple  Cardiac: RRR, S1S2  Pulmonary: chest rise symmetric  Abdomen: soft, nontender, nondistended  Ext: perfusing well  Skin: warm, dry  Wound: bicoronal incision c/d/i + stapled  LABS:                        9.8    10.49 )-----------( 218      ( 05 Nov 2022 06:26 )             30.2     11-05    141  |  107  |  22  ----------------------------<  107<H>  4.4   |  26  |  0.75    Ca    8.8      05 Nov 2022 06:26  Phos  4.2     11-05  Mg     2.1     11-05              CAPILLARY BLOOD GLUCOSE          Drug Levels: [] N/A    CSF Analysis: [] N/A      Allergies    No Known Drug Allergies  strawberry (Hives)    Intolerances      MEDICATIONS:  Antibiotics:  amoxicillin  500 milliGRAM(s)/clavulanate 1 Tablet(s) Oral every 12 hours    Neuro:  acetaminophen     Tablet .. 650 milliGRAM(s) Oral every 6 hours PRN  brivaracetam 50 milliGRAM(s) Oral every 12 hours  melatonin 10 milliGRAM(s) Oral at bedtime  QUEtiapine 12.5 milliGRAM(s) Oral at bedtime  QUEtiapine 12.5 milliGRAM(s) Oral daily PRN    Anticoagulation:  enoxaparin Injectable 40 milliGRAM(s) SubCutaneous <User Schedule>    OTHER:  benzocaine 15 mG/menthol 3.6 mG Lozenge 1 Lozenge Oral daily  bisacodyl 5 milliGRAM(s) Oral daily PRN  hydrALAZINE Injectable 10 milliGRAM(s) IV Push every 2 hours PRN  lisinopril 10 milliGRAM(s) Oral daily  metoprolol tartrate 50 milliGRAM(s) Oral two times a day  pantoprazole    Tablet 40 milliGRAM(s) Oral before breakfast  polyethylene glycol 3350 17 Gram(s) Oral two times a day  senna 2 Tablet(s) Oral at bedtime    77 year old female with pmh HTN found to have left frontal brain mass, likely meningioma, on workup for confusion and memory loss. Patient is now s/p bifrontal craniotomy for meningioma resection (frozen: meningioma) 10/27/22    Plan:  Neuro:  - neuro checks/vital signs q4  - pain control PRN  - Bivirat 50 BID   - decadron 4q6 x3d then taper over 1 week  - postop MRI brain done 10/30  - CTH 10/27 and 11/1, 11/2 - stable, significant edema  - agitation: zyprexa PRN, seroquel 12.5mg AM PRN, 12.5mg at bedtime, melatonin 10mg qhs     Cardiac:  - -140  - cont home lisinopril 10mg qd   - Increased home metoprolol to 50mg BID 10/30    Pulmonary:  - on RA, IS    GI:  - Regular diet  - bowel regimen  - PPI while on steroids    Renal:  - voiding  - straight cath PRN     Heme:  - SCDs, SQL   - LE dopplers 10/26: neg for DVT  - Microcytic anemia: f/u iron studies     Endo:  - A1C 5.1, no issues    ID:  - afebrile, trend leukocytosis  - augmentin x 10 days (10/31-11/10) per Roseline     Disposition: SDU status, full code, PT/OT recs AR, Family updated with plan    Assessment and Plan d/w Dr. Garnica

## 2022-11-07 NOTE — PROGRESS NOTE ADULT - PROBLEM SELECTOR PLAN 2
Rapid response, likely vagal episode given rapid return to baseline.  Soft BP improved with IV bolus  - trend Trops  - repeat lactate s/p fluids  - no signs of infection for source of sepsis

## 2022-11-08 DIAGNOSIS — G92.9 UNSPECIFIED TOXIC ENCEPHALOPATHY: ICD-10-CM

## 2022-11-08 LAB
ALBUMIN SERPL ELPH-MCNC: 2.9 G/DL — LOW (ref 3.3–5)
ALP SERPL-CCNC: 66 U/L — SIGNIFICANT CHANGE UP (ref 40–120)
ALT FLD-CCNC: 26 U/L — SIGNIFICANT CHANGE UP (ref 10–45)
ANION GAP SERPL CALC-SCNC: 13 MMOL/L — SIGNIFICANT CHANGE UP (ref 5–17)
ANION GAP SERPL CALC-SCNC: 7 MMOL/L — SIGNIFICANT CHANGE UP (ref 5–17)
ANION GAP SERPL CALC-SCNC: 9 MMOL/L — SIGNIFICANT CHANGE UP (ref 5–17)
APTT BLD: 27.5 SEC — SIGNIFICANT CHANGE UP (ref 27.5–35.5)
AST SERPL-CCNC: 22 U/L — SIGNIFICANT CHANGE UP (ref 10–40)
BASE EXCESS BLDA CALC-SCNC: -0.3 MMOL/L — SIGNIFICANT CHANGE UP (ref -2–3)
BILIRUB SERPL-MCNC: <0.2 MG/DL — SIGNIFICANT CHANGE UP (ref 0.2–1.2)
BLD GP AB SCN SERPL QL: NEGATIVE — SIGNIFICANT CHANGE UP
BUN SERPL-MCNC: 20 MG/DL — SIGNIFICANT CHANGE UP (ref 7–23)
BUN SERPL-MCNC: 20 MG/DL — SIGNIFICANT CHANGE UP (ref 7–23)
BUN SERPL-MCNC: 24 MG/DL — HIGH (ref 7–23)
CALCIUM SERPL-MCNC: 8 MG/DL — LOW (ref 8.4–10.5)
CALCIUM SERPL-MCNC: 8.3 MG/DL — LOW (ref 8.4–10.5)
CALCIUM SERPL-MCNC: 8.8 MG/DL — SIGNIFICANT CHANGE UP (ref 8.4–10.5)
CHLORIDE SERPL-SCNC: 104 MMOL/L — SIGNIFICANT CHANGE UP (ref 96–108)
CHLORIDE SERPL-SCNC: 105 MMOL/L — SIGNIFICANT CHANGE UP (ref 96–108)
CHLORIDE SERPL-SCNC: 106 MMOL/L — SIGNIFICANT CHANGE UP (ref 96–108)
CK MB CFR SERPL CALC: 2.3 NG/ML — SIGNIFICANT CHANGE UP (ref 0–6.7)
CK SERPL-CCNC: 83 U/L — SIGNIFICANT CHANGE UP (ref 25–170)
CO2 BLDA-SCNC: 25 MMOL/L — HIGH (ref 19–24)
CO2 SERPL-SCNC: 21 MMOL/L — LOW (ref 22–31)
CO2 SERPL-SCNC: 22 MMOL/L — SIGNIFICANT CHANGE UP (ref 22–31)
CO2 SERPL-SCNC: 25 MMOL/L — SIGNIFICANT CHANGE UP (ref 22–31)
CREAT SERPL-MCNC: 0.74 MG/DL — SIGNIFICANT CHANGE UP (ref 0.5–1.3)
CREAT SERPL-MCNC: 0.76 MG/DL — SIGNIFICANT CHANGE UP (ref 0.5–1.3)
CREAT SERPL-MCNC: 0.76 MG/DL — SIGNIFICANT CHANGE UP (ref 0.5–1.3)
EGFR: 82 ML/MIN/1.73M2 — SIGNIFICANT CHANGE UP
EGFR: 82 ML/MIN/1.73M2 — SIGNIFICANT CHANGE UP
EGFR: 84 ML/MIN/1.73M2 — SIGNIFICANT CHANGE UP
GLUCOSE BLDC GLUCOMTR-MCNC: 122 MG/DL — HIGH (ref 70–99)
GLUCOSE SERPL-MCNC: 109 MG/DL — HIGH (ref 70–99)
GLUCOSE SERPL-MCNC: 86 MG/DL — SIGNIFICANT CHANGE UP (ref 70–99)
GLUCOSE SERPL-MCNC: 97 MG/DL — SIGNIFICANT CHANGE UP (ref 70–99)
HCO3 BLDA-SCNC: 24 MMOL/L — SIGNIFICANT CHANGE UP (ref 21–28)
HCT VFR BLD CALC: 28.2 % — LOW (ref 34.5–45)
HCT VFR BLD CALC: 32.4 % — LOW (ref 34.5–45)
HGB BLD-MCNC: 10.2 G/DL — LOW (ref 11.5–15.5)
HGB BLD-MCNC: 9.1 G/DL — LOW (ref 11.5–15.5)
INR BLD: 1.2 — HIGH (ref 0.88–1.16)
LACTATE SERPL-SCNC: 0.9 MMOL/L — SIGNIFICANT CHANGE UP (ref 0.5–2)
MAGNESIUM SERPL-MCNC: 2 MG/DL — SIGNIFICANT CHANGE UP (ref 1.6–2.6)
MAGNESIUM SERPL-MCNC: 2.1 MG/DL — SIGNIFICANT CHANGE UP (ref 1.6–2.6)
MCHC RBC-ENTMCNC: 22.6 PG — LOW (ref 27–34)
MCHC RBC-ENTMCNC: 22.7 PG — LOW (ref 27–34)
MCHC RBC-ENTMCNC: 31.5 GM/DL — LOW (ref 32–36)
MCHC RBC-ENTMCNC: 32.3 GM/DL — SIGNIFICANT CHANGE UP (ref 32–36)
MCV RBC AUTO: 70.3 FL — LOW (ref 80–100)
MCV RBC AUTO: 71.8 FL — LOW (ref 80–100)
NRBC # BLD: 0 /100 WBCS — SIGNIFICANT CHANGE UP (ref 0–0)
NRBC # BLD: 0 /100 WBCS — SIGNIFICANT CHANGE UP (ref 0–0)
PCO2 BLDA: 38 MMHG — SIGNIFICANT CHANGE UP (ref 32–45)
PH BLDA: 7.41 — SIGNIFICANT CHANGE UP (ref 7.35–7.45)
PHOSPHATE SERPL-MCNC: 3.2 MG/DL — SIGNIFICANT CHANGE UP (ref 2.5–4.5)
PHOSPHATE SERPL-MCNC: 3.3 MG/DL — SIGNIFICANT CHANGE UP (ref 2.5–4.5)
PLATELET # BLD AUTO: 220 K/UL — SIGNIFICANT CHANGE UP (ref 150–400)
PLATELET # BLD AUTO: 229 K/UL — SIGNIFICANT CHANGE UP (ref 150–400)
PO2 BLDA: 69 MMHG — LOW (ref 83–108)
POTASSIUM SERPL-MCNC: 4.1 MMOL/L — SIGNIFICANT CHANGE UP (ref 3.5–5.3)
POTASSIUM SERPL-MCNC: 4.2 MMOL/L — SIGNIFICANT CHANGE UP (ref 3.5–5.3)
POTASSIUM SERPL-MCNC: 4.5 MMOL/L — SIGNIFICANT CHANGE UP (ref 3.5–5.3)
POTASSIUM SERPL-SCNC: 4.1 MMOL/L — SIGNIFICANT CHANGE UP (ref 3.5–5.3)
POTASSIUM SERPL-SCNC: 4.2 MMOL/L — SIGNIFICANT CHANGE UP (ref 3.5–5.3)
POTASSIUM SERPL-SCNC: 4.5 MMOL/L — SIGNIFICANT CHANGE UP (ref 3.5–5.3)
PROT SERPL-MCNC: 5.5 G/DL — LOW (ref 6–8.3)
PROTHROM AB SERPL-ACNC: 14.3 SEC — HIGH (ref 10.5–13.4)
RBC # BLD: 4.01 M/UL — SIGNIFICANT CHANGE UP (ref 3.8–5.2)
RBC # BLD: 4.51 M/UL — SIGNIFICANT CHANGE UP (ref 3.8–5.2)
RBC # FLD: 16.9 % — HIGH (ref 10.3–14.5)
RBC # FLD: 17 % — HIGH (ref 10.3–14.5)
RH IG SCN BLD-IMP: POSITIVE — SIGNIFICANT CHANGE UP
SAO2 % BLDA: 95.9 % — SIGNIFICANT CHANGE UP (ref 94–98)
SARS-COV-2 RNA SPEC QL NAA+PROBE: NEGATIVE — SIGNIFICANT CHANGE UP
SODIUM SERPL-SCNC: 136 MMOL/L — SIGNIFICANT CHANGE UP (ref 135–145)
SODIUM SERPL-SCNC: 138 MMOL/L — SIGNIFICANT CHANGE UP (ref 135–145)
SODIUM SERPL-SCNC: 138 MMOL/L — SIGNIFICANT CHANGE UP (ref 135–145)
TROPONIN T SERPL-MCNC: 0.01 NG/ML — SIGNIFICANT CHANGE UP (ref 0–0.01)
WBC # BLD: 10.15 K/UL — SIGNIFICANT CHANGE UP (ref 3.8–10.5)
WBC # BLD: 9.64 K/UL — SIGNIFICANT CHANGE UP (ref 3.8–10.5)
WBC # FLD AUTO: 10.15 K/UL — SIGNIFICANT CHANGE UP (ref 3.8–10.5)
WBC # FLD AUTO: 9.64 K/UL — SIGNIFICANT CHANGE UP (ref 3.8–10.5)

## 2022-11-08 PROCEDURE — 70498 CT ANGIOGRAPHY NECK: CPT | Mod: 26

## 2022-11-08 PROCEDURE — 99221 1ST HOSP IP/OBS SF/LOW 40: CPT

## 2022-11-08 PROCEDURE — 0042T: CPT

## 2022-11-08 PROCEDURE — 99024 POSTOP FOLLOW-UP VISIT: CPT

## 2022-11-08 PROCEDURE — 99233 SBSQ HOSP IP/OBS HIGH 50: CPT

## 2022-11-08 PROCEDURE — 70496 CT ANGIOGRAPHY HEAD: CPT | Mod: 26

## 2022-11-08 PROCEDURE — 93010 ELECTROCARDIOGRAM REPORT: CPT

## 2022-11-08 PROCEDURE — 71045 X-RAY EXAM CHEST 1 VIEW: CPT | Mod: 26

## 2022-11-08 RX ORDER — SODIUM CHLORIDE 5 G/100ML
1000 INJECTION, SOLUTION INTRAVENOUS
Refills: 0 | Status: DISCONTINUED | OUTPATIENT
Start: 2022-11-08 | End: 2022-11-09

## 2022-11-08 RX ORDER — LEVETIRACETAM 250 MG/1
500 TABLET, FILM COATED ORAL
Refills: 0 | Status: DISCONTINUED | OUTPATIENT
Start: 2022-11-08 | End: 2022-11-15

## 2022-11-08 RX ORDER — ASPIRIN/CALCIUM CARB/MAGNESIUM 324 MG
81 TABLET ORAL DAILY
Refills: 0 | Status: DISCONTINUED | OUTPATIENT
Start: 2022-11-08 | End: 2022-11-12

## 2022-11-08 RX ORDER — PANTOPRAZOLE SODIUM 20 MG/1
40 TABLET, DELAYED RELEASE ORAL
Refills: 0 | Status: DISCONTINUED | OUTPATIENT
Start: 2022-11-08 | End: 2022-11-08

## 2022-11-08 RX ORDER — DEXAMETHASONE 0.5 MG/5ML
2 ELIXIR ORAL EVERY 12 HOURS
Refills: 0 | Status: DISCONTINUED | OUTPATIENT
Start: 2022-11-08 | End: 2022-11-15

## 2022-11-08 RX ADMIN — Medication 2 MILLIGRAM(S): at 19:42

## 2022-11-08 RX ADMIN — Medication 81 MILLIGRAM(S): at 19:42

## 2022-11-08 RX ADMIN — Medication 1 TABLET(S): at 19:42

## 2022-11-08 RX ADMIN — LEVETIRACETAM 500 MILLIGRAM(S): 250 TABLET, FILM COATED ORAL at 19:42

## 2022-11-08 RX ADMIN — Medication 1 TABLET(S): at 05:50

## 2022-11-08 RX ADMIN — Medication 50 MILLIGRAM(S): at 22:20

## 2022-11-08 RX ADMIN — PANTOPRAZOLE SODIUM 40 MILLIGRAM(S): 20 TABLET, DELAYED RELEASE ORAL at 05:50

## 2022-11-08 RX ADMIN — BRIVARACETAM 50 MILLIGRAM(S): 25 TABLET, FILM COATED ORAL at 05:50

## 2022-11-08 RX ADMIN — POLYETHYLENE GLYCOL 3350 17 GRAM(S): 17 POWDER, FOR SOLUTION ORAL at 19:42

## 2022-11-08 RX ADMIN — SODIUM CHLORIDE 25 MILLILITER(S): 5 INJECTION, SOLUTION INTRAVENOUS at 18:07

## 2022-11-08 RX ADMIN — SENNA PLUS 2 TABLET(S): 8.6 TABLET ORAL at 22:20

## 2022-11-08 RX ADMIN — POLYETHYLENE GLYCOL 3350 17 GRAM(S): 17 POWDER, FOR SOLUTION ORAL at 05:50

## 2022-11-08 NOTE — PROGRESS NOTE ADULT - PROBLEM SELECTOR PLAN 2
Resolved, was likely vasovagal event on 11/7  - Trops and repeat lactate normalized  - no signs of infection for source of sepsis

## 2022-11-08 NOTE — PROVIDER CONTACT NOTE (CHANGE IN STATUS NOTIFICATION) - ASSESSMENT
Pt unresponsive to sternal rub, left sided facial droop noted. Vital signs stable: /68, , SpO2 96% on RA.

## 2022-11-08 NOTE — CHART NOTE - NSCHARTNOTEFT_GEN_A_CORE
Admitting Diagnosis:   Patient is a 77y old  Female who presents with a chief complaint of meningioma (2022 14:14)      PAST MEDICAL & SURGICAL HISTORY:  Meningioma      Hypertension      H/O hyperthyroidism  s/p thyroid surgery, no longer on medication      Status post thyroid surgery          Current Nutrition Order:   Regular diet, Ensure Plus High Protein TID (1050 kcal, 60g protein, 540mL free H2O)     PO Intake: Good (%) [ x  ]  Fair (50-75%) [   ] Poor (<25%) [   ]    GI Issues: No reported n/v/d/c. Last BM   No noted abd distention or discomfort    Pain: No pain reported    Skin Integrity: Wenceslao 20, surgical incision noted  No PU  Edema 1+ L periorbital    Labs:       136  |  105  |  20  ----------------------------<  97  4.1   |  22  |  0.74    Ca    8.0<L>      2022 13:46  Phos  3.3       Mg     2.1         TPro  5.5<L>  /  Alb  2.9<L>  /  TBili  <0.2  /  DBili  x   /  AST  22  /  ALT  26  /  AlkPhos  66      CAPILLARY BLOOD GLUCOSE      POCT Blood Glucose.: 122 mg/dL (2022 12:40)      Medications:  MEDICATIONS  (STANDING):  amoxicillin  500 milliGRAM(s)/clavulanate 1 Tablet(s) Oral every 12 hours  benzocaine 15 mG/menthol 3.6 mG Lozenge 1 Lozenge Oral daily  brivaracetam 50 milliGRAM(s) Oral every 12 hours  melatonin 10 milliGRAM(s) Oral at bedtime  metoprolol tartrate 50 milliGRAM(s) Oral two times a day  pantoprazole    Tablet 40 milliGRAM(s) Oral before breakfast  polyethylene glycol 3350 17 Gram(s) Oral two times a day  senna 2 Tablet(s) Oral at bedtime    MEDICATIONS  (PRN):  acetaminophen     Tablet .. 650 milliGRAM(s) Oral every 6 hours PRN Mild Pain (1 - 3)  bisacodyl 5 milliGRAM(s) Oral daily PRN Constipation  hydrALAZINE Injectable 10 milliGRAM(s) IV Push every 2 hours PRN SBP>160    Adm Anthropometrics:  Height for BMI (FEET)	5 Feet  Height for BMI (INCHES)	8 Inch(s)  Height for BMI (CENTIMETERS)	172.72 Centimeter(s)  Weight for BMI (lbs)	170 lb  Weight for BMI (kg)	77.1 kg  Body Mass Index	25.8    Weight Change: No new wts in EMR. Please obtain new wts to EMR to assess for changes    Estimated energy needs:   IBW used for calculations as pt >120% of IBW (121%), adjusted for wound healing, age  20-25kcal/k-1587kcal  1.2-1.4g/k-89gprotein  30-35ml/k-2222ml fluid    Subjective:   77 year old female with pmh HTN found to have left frontal brain mass, likely meningioma, on workup for confusion and memory loss. Patient is now s/p bifrontal craniotomy for meningioma resection (frozen: meningioma) 10/27/22.    Pt seen resting in bed, sleeping. PCA sitting in room for assistance. Per PCA, pt had good PO intake this morning, eating 100% of meals. States good appetite. Denies n/v/d/c. Last BM . No reported signs of discomfort or pain noted. Pt to continue to meet adequate EER via PO. RD to follow.    Previous Nutrition Diagnosis:  Increased nutrient needs RT post-op nutrition optimization AEB s/p bifrontal craniotomy for meningioma resection   Active [  x ]  Resolved [   ]    If resolved, new PES:     Goal: Pt to meet >75% estimated nutrition needs consistently.     Recommendations:  1. Continue regular diet + Ensure Enlive TID (1050kcal/60gpro)   2. Optimize meal time when pt awake and hungry  3. Provide assistance with meals prn  4. Monitor lytes and replete  5. RD to remain available prn     Education: N/A    Risk Level: High [   ] Moderate [  x ] Low [   ]

## 2022-11-08 NOTE — CONSULT NOTE ADULT - NS ATTEND AMEND GEN_ALL_CORE FT
The patient is a 77-year-old female with a history of left frontal meningioma status post bifrontal craniotomy with meningioma resection on 10/27.  Postoperatively, the patient has been intermittently confused.  She had 1 episode of unresponsiveness yesterday afternoon which was thought secondary to syncope.  Today, the patient was found while eating lunch unresponsive, with a left facial droop, dysarthria, left hemiparesis, and right gaze preference.  Repeat head CT was overall stable compared to prior scans.  Notably, the patient still has persistent significant edema; steroids were stopped yesterday.  She also has bifrontal subdural hematomas which are stable in size.  CTA/P were unremarkable.  Following scans, the patient had improved significantly.  No clear metabolic sources. She remains confused but is overall improved with no other focal deficits.  The patient is on Briviact 50 mg twice daily for seizure PPx.    Etiology of the patient's spell today is not entirely clear.  She presented like a right MCA syndrome.  However, there was no LVO or other findings to support a cerebrovascular cause.  The patient could have been postictal with the focus of epileptic activity being secondary to the right frontal subdural hematoma.  Recommend EEG. Continue AED therapy (ok to switch to Keppra). Ok to start aspirin 81 mg. Agree with restarting steroids. If eval is unremarkable, can consider MRI.

## 2022-11-08 NOTE — PROGRESS NOTE ADULT - SUBJECTIVE AND OBJECTIVE BOX
HPI:  77 year old female with pmh HTN who lives in [place] presents with left frontal mass, likely meningioma. Per son, meningioma was diagnosed upon CT head performed for memory loss and confusion. Son reports that confusion has been ongoing for several months. Patient admits to mild headache but denies diplopia, blurry vision, nausea, vomiting, extremity numbness or weakness and slurred speech. Patient is preop for bifrontal craniotomy for meningioma resection 10/27.   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 Waxing/waning neuro exam. CTH stable, but with significant edema. Na 143, given 250cc 3% bolus.   10/28: POD1. CTH overnight stable. Oxycodone dc'd due to intermittent lethargy. He removed pending TOV. Failed TOV, straight cath prn.   10/29: POD2. Neuro stable. JPx2. Agitated overnight, precedex started transiently. SBP drop, precedex dc'd and 1L bolus given. Started on seroquel 12.5 at bedtime prn.  10/30: POD3, given additional 12.5 mg seroquel and placed on wrist restraints overnight for agitation. Wrist restraints removed. JUN # 2 removed. Metoprolol increased to 50 BID. Iron studies ordered for microcytic anemia.    10/31: POD4 BALJINDER overnight   11/1: POD5. patient agitated overnight attemtping to leave hospital, security at bedside. ordered 1:1 supervision. psych consulted, recc 10mg melatonin qd, seroquel 100mg BID and zyprexa up to 30mg/day. patient on restraints: b/l wrist/mittens, and soft vest./98 this morning hydral given, pending nutrition consult, upgraded to ICU for decreased level of arousal   11/2: POD 6 mening resection, ICU overnight, mental status improved but not fully back to post op baseline per son. CTH in AM stable, moved to SDU. Seroquel dose decreased per  reccomendations.   11/3: POD 7 meningioma resection. mental status improving.   11/4: POD 8 meningioma resection   11/5: POD 9 meningioma resection. Staples in place. BALJINDER overnight.   11/6: POD 10 resection. BALJINDER overnight.   11/7: POD 11 rsxn, BALJINDER overnight. Pt was in the bathroom brushing her teeth with her daughter with her when she suddenly felt light headed and became unresponsive, the nurse called a Rapid response and patient was brought back to the bed. Vitals were assessed at this time, FS was normal 118, pt was satting 95 on room air, BP was in the systolic 90's, 1 L normal saline was given. Pt returned to her neurologic baseline of AOx1 (self). EKG obtained, BMP/lactate/cardiac enzymes drawn. Pt remains in bed with 1 L bolus over a pressure bag. Dr. Garnica was notified and the hospitalist Dr. Cruz was at bedside. Lactate 3.5, given additional 1 L bolus. Lactate normalized to 1.2. Troponin normalized. Pt back to neurologic baseline.       OVERNIGHT EVENTS: no acute events   Vital Signs Last 24 Hrs  T(C): 36.8 (07 Nov 2022 23:22), Max: 36.8 (07 Nov 2022 18:20)  T(F): 98.3 (07 Nov 2022 23:22), Max: 98.3 (07 Nov 2022 18:20)  HR: 91 (07 Nov 2022 23:22) (72 - 91)  BP: 120/73 (07 Nov 2022 23:22) (100/56 - 120/75)  BP(mean): --  RR: 18 (07 Nov 2022 23:22) (17 - 18)  SpO2: 94% (07 Nov 2022 23:22) (94% - 98%)    Parameters below as of 07 Nov 2022 23:22  Patient On (Oxygen Delivery Method): room air        I&O's Summary    06 Nov 2022 07:01  -  07 Nov 2022 07:00  --------------------------------------------------------  IN: 720 mL / OUT: 0 mL / NET: 720 mL    07 Nov 2022 07:01  -  08 Nov 2022 01:27  --------------------------------------------------------  IN: 1000 mL / OUT: 800 mL / NET: 200 mL        PHYSICAL EXAM:  General: patient seen laying supine in bed in NAD  Neuro: AAOx2 (self, hospital), FC, OE spontaneously, speech confused, CNII-XI grossly intact, face symmetric, no pronator drift, strength 5/5 b/l UE and LE, sensation grossly intact to light touch throughout  HEENT: PERRL, EOMI  Neck: supple  Cardiac: RRR, S1S2  Pulmonary: chest rise symmetric  Abdomen: soft, nontender, nondistended  Ext: perfusing well  Skin: warm, dry  Wound: bicoronal incision c/d/i + stapleds    LABS:                        10.3   11.55 )-----------( 245      ( 07 Nov 2022 07:48 )             32.1     11-07    142  |  106  |  23  ----------------------------<  119<H>  4.0   |  23  |  0.96    Ca    9.1      07 Nov 2022 11:07  Phos  3.7     11-07  Mg     2.0     11-07          CARDIAC MARKERS ( 07 Nov 2022 15:19 )  x     / 0.01 ng/mL / x     / x     / x      CARDIAC MARKERS ( 07 Nov 2022 11:07 )  x     / 0.03 ng/mL / 120 U/L / x     / 4.3 ng/mL      CAPILLARY BLOOD GLUCOSE      POCT Blood Glucose.: 118 mg/dL (07 Nov 2022 10:23)      Drug Levels: [] N/A    CSF Analysis: [] N/A      Allergies    No Known Drug Allergies  strawberry (Hives)    Intolerances      MEDICATIONS:  Antibiotics:  amoxicillin  500 milliGRAM(s)/clavulanate 1 Tablet(s) Oral every 12 hours    Neuro:  acetaminophen     Tablet .. 650 milliGRAM(s) Oral every 6 hours PRN  brivaracetam 50 milliGRAM(s) Oral every 12 hours  melatonin 10 milliGRAM(s) Oral at bedtime  QUEtiapine 12.5 milliGRAM(s) Oral at bedtime  QUEtiapine 12.5 milliGRAM(s) Oral daily PRN    Anticoagulation:  enoxaparin Injectable 40 milliGRAM(s) SubCutaneous <User Schedule>    OTHER:  benzocaine 15 mG/menthol 3.6 mG Lozenge 1 Lozenge Oral daily  bisacodyl 5 milliGRAM(s) Oral daily PRN  hydrALAZINE Injectable 10 milliGRAM(s) IV Push every 2 hours PRN  metoprolol tartrate 50 milliGRAM(s) Oral two times a day  pantoprazole    Tablet 40 milliGRAM(s) Oral before breakfast  polyethylene glycol 3350 17 Gram(s) Oral two times a day  senna 2 Tablet(s) Oral at bedtime    IVF:    CULTURES:    77 year old female with pmh HTN found to have left frontal brain mass, likely meningioma, on workup for confusion and memory loss. Patient is now s/p bifrontal craniotomy for meningioma resection (frozen: meningioma) 10/27/22    Plan:  Neuro:  - neuro checks/vital signs q4  - pain control PRN  - Bivirat 50 BID   - decadron 4q6 x3d then taper over 1 week  - postop MRI brain done 10/30  - CTH 10/27 and 11/1, 11/2 - stable, significant edema  - agitation: zyprexa PRN, seroquel 12.5mg AM PRN, 12.5mg at bedtime, melatonin 10mg qhs     Cardiac:  - -140  - holding home lisinopril d/t hypotension   - Increased home metoprolol to 50mg BID 10/30    Pulmonary:  - on RA, IS    GI:  - Regular diet  - bowel regimen  - PPI while on steroids    Renal:  - voiding  - straight cath PRN     Heme:  - SCDs, SQL   - LE dopplers 10/26: neg for DVT  - Microcytic anemia: f/u iron studies     Endo:  - A1C 5.1, no issues    ID:  - afebrile, trend leukocytosis  - augmentin x 10 days (10/31-11/10) per Roseline     Disposition: SDU status, full code, PT/OT recs AR, Family updated with plan    Assessment and Plan d/w Dr. Garnica        HPI:  77 year old female with pmh HTN who lives in [place] presents with left frontal mass, likely meningioma. Per son, meningioma was diagnosed upon CT head performed for memory loss and confusion. Son reports that confusion has been ongoing for several months. Patient admits to mild headache but denies diplopia, blurry vision, nausea, vomiting, extremity numbness or weakness and slurred speech. Patient is preop for bifrontal craniotomy for meningioma resection 10/27.   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 Waxing/waning neuro exam. CTH stable, but with significant edema. Na 143, given 250cc 3% bolus.   10/28: POD1. CTH overnight stable. Oxycodone dc'd due to intermittent lethargy. He removed pending TOV. Failed TOV, straight cath prn.   10/29: POD2. Neuro stable. JPx2. Agitated overnight, precedex started transiently. SBP drop, precedex dc'd and 1L bolus given. Started on seroquel 12.5 at bedtime prn.  10/30: POD3, given additional 12.5 mg seroquel and placed on wrist restraints overnight for agitation. Wrist restraints removed. JUN # 2 removed. Metoprolol increased to 50 BID. Iron studies ordered for microcytic anemia.    10/31: POD4 BALJINDER overnight   11/1: POD5. patient agitated overnight attemtping to leave hospital, security at bedside. ordered 1:1 supervision. psych consulted, recc 10mg melatonin qd, seroquel 100mg BID and zyprexa up to 30mg/day. patient on restraints: b/l wrist/mittens, and soft vest./98 this morning hydral given, pending nutrition consult, upgraded to ICU for decreased level of arousal   11/2: POD 6 mening resection, ICU overnight, mental status improved but not fully back to post op baseline per son. CTH in AM stable, moved to SDU. Seroquel dose decreased per  reccomendations.   11/3: POD 7 meningioma resection. mental status improving.   11/4: POD 8 meningioma resection   11/5: POD 9 meningioma resection. Staples in place. BALJINDER overnight.   11/6: POD 10 resection. BALJINDER overnight.   11/7: POD 11 rsxn, BALJINDER overnight. Pt was in the bathroom brushing her teeth with her daughter with her when she suddenly felt light headed and became unresponsive, the nurse called a Rapid response and patient was brought back to the bed. Vitals were assessed at this time, FS was normal 118, pt was satting 95 on room air, BP was in the systolic 90's, 1 L normal saline was given. Pt returned to her neurologic baseline of AOx1 (self). EKG obtained, BMP/lactate/cardiac enzymes drawn. Pt remains in bed with 1 L bolus over a pressure bag. Dr. Garnica was notified and the hospitalist Dr. Cruz was at bedside. Lactate 3.5, given additional 1 L bolus. Lactate normalized to 1.2. Troponin normalized. Pt back to neurologic baseline.   11/8: POD12. BALJINDER overnight. No issues with vitals overnight, patient feels well this am. Staples removed from bicoronal crani incision, healing well. Pend auth for [facility].       OVERNIGHT EVENTS: no acute events   Vital Signs Last 24 Hrs  T(C): 36.8 (07 Nov 2022 23:22), Max: 36.8 (07 Nov 2022 18:20)  T(F): 98.3 (07 Nov 2022 23:22), Max: 98.3 (07 Nov 2022 18:20)  HR: 91 (07 Nov 2022 23:22) (72 - 91)  BP: 120/73 (07 Nov 2022 23:22) (100/56 - 120/75)  BP(mean): --  RR: 18 (07 Nov 2022 23:22) (17 - 18)  SpO2: 94% (07 Nov 2022 23:22) (94% - 98%)    Parameters below as of 07 Nov 2022 23:22  Patient On (Oxygen Delivery Method): room air        I&O's Summary    06 Nov 2022 07:01  -  07 Nov 2022 07:00  --------------------------------------------------------  IN: 720 mL / OUT: 0 mL / NET: 720 mL    07 Nov 2022 07:01  -  08 Nov 2022 01:27  --------------------------------------------------------  IN: 1000 mL / OUT: 800 mL / NET: 200 mL        PHYSICAL EXAM:  General: patient seen laying supine in bed in NAD  Neuro: AAOx2 (self, hospital), FC, OE spontaneously, speech confused, CNII-XI grossly intact, face symmetric, no pronator drift, strength 5/5 b/l UE and LE, sensation grossly intact to light touch throughout  HEENT: PERRL, EOMI  Neck: supple  Cardiac: RRR, S1S2  Pulmonary: chest rise symmetric  Abdomen: soft, nontender, nondistended  Ext: perfusing well  Skin: warm, dry  Wound: bicoronal incision c/d/i + stapleds    LABS:                        10.3   11.55 )-----------( 245      ( 07 Nov 2022 07:48 )             32.1     11-07    142  |  106  |  23  ----------------------------<  119<H>  4.0   |  23  |  0.96    Ca    9.1      07 Nov 2022 11:07  Phos  3.7     11-07  Mg     2.0     11-07          CARDIAC MARKERS ( 07 Nov 2022 15:19 )  x     / 0.01 ng/mL / x     / x     / x      CARDIAC MARKERS ( 07 Nov 2022 11:07 )  x     / 0.03 ng/mL / 120 U/L / x     / 4.3 ng/mL      CAPILLARY BLOOD GLUCOSE      POCT Blood Glucose.: 118 mg/dL (07 Nov 2022 10:23)      Drug Levels: [] N/A    CSF Analysis: [] N/A      Allergies    No Known Drug Allergies  strawberry (Hives)    Intolerances      MEDICATIONS:  Antibiotics:  amoxicillin  500 milliGRAM(s)/clavulanate 1 Tablet(s) Oral every 12 hours    Neuro:  acetaminophen     Tablet .. 650 milliGRAM(s) Oral every 6 hours PRN  brivaracetam 50 milliGRAM(s) Oral every 12 hours  melatonin 10 milliGRAM(s) Oral at bedtime  QUEtiapine 12.5 milliGRAM(s) Oral at bedtime  QUEtiapine 12.5 milliGRAM(s) Oral daily PRN    Anticoagulation:  enoxaparin Injectable 40 milliGRAM(s) SubCutaneous <User Schedule>    OTHER:  benzocaine 15 mG/menthol 3.6 mG Lozenge 1 Lozenge Oral daily  bisacodyl 5 milliGRAM(s) Oral daily PRN  hydrALAZINE Injectable 10 milliGRAM(s) IV Push every 2 hours PRN  metoprolol tartrate 50 milliGRAM(s) Oral two times a day  pantoprazole    Tablet 40 milliGRAM(s) Oral before breakfast  polyethylene glycol 3350 17 Gram(s) Oral two times a day  senna 2 Tablet(s) Oral at bedtime    IVF:    CULTURES:    77 year old female with pmh HTN found to have left frontal brain mass, likely meningioma, on workup for confusion and memory loss. Patient is now s/p bifrontal craniotomy for meningioma resection (frozen: meningioma) 10/27/22    Plan:  Neuro:  - neuro checks/vital signs q4  - pain control PRN  - Bivirat 50 BID   - decadron 4q6 x3d then taper over 1 week  - postop MRI brain done 10/30  - CTH 10/27 and 11/1, 11/2 - stable, significant edema  - agitation: zyprexa PRN, seroquel 12.5mg AM PRN, 12.5mg at bedtime, melatonin 10mg qhs     Cardiac:  - -140  - holding home lisinopril d/t hypotension   - Increased home metoprolol to 50mg BID 10/30    Pulmonary:  - on RA, IS    GI:  - Regular diet  - bowel regimen  - PPI while on steroids    Renal:  - voiding  - straight cath PRN     Heme:  - SCDs, SQL   - LE dopplers 10/26: neg for DVT  - Microcytic anemia: f/u iron studies     Endo:  - A1C 5.1, no issues    ID:  - afebrile, trend leukocytosis  - augmentin x 10 days (10/31-11/10) per Roseline     Disposition: SDU status, full code, PT/OT recs AR, Family updated with plan    Assessment and Plan d/w Dr. Garnica

## 2022-11-08 NOTE — CONSULT NOTE ADULT - SUBJECTIVE AND OBJECTIVE BOX
**STROKE CODE CONSULT NOTE**    Last known well time/Time of onset of symptoms: 11/8 12:50pm    HPI: 77y Female with PMHx of HTN and left frontal meningioma (presented as memory loss and confusion) s/p bifrontal craniotomy for meningeal resection 10/27/22 on this admission. Stroke code called for unresponsiveness when eating lunch and left sided weakness. Yesterday, patient also had an episode of unresponsives when getting up to use the restroom, attributed to vasovagal episode. /68. NIHSS 23. Exam significant for left facial droop, dysarthria, right gaze that does not cross midline, left arm and leg weakness. Patient slowly improved during course of code. CTH stable compared to prior,     T(C): 36.6 (11-08-22 @ 09:08), Max: 36.8 (11-07-22 @ 18:20)  HR: 126 (11-08-22 @ 12:37) (82 - 126)  BP: 117/68 (11-08-22 @ 12:37) (100/66 - 120/75)  RR: 18 (11-08-22 @ 12:37) (17 - 18)  SpO2: 96% (11-08-22 @ 12:37) (94% - 97%)    PAST MEDICAL & SURGICAL HISTORY:  Meningioma      Hypertension      H/O hyperthyroidism  s/p thyroid surgery, no longer on medication      Status post thyroid surgery          FAMILY HISTORY:      SOCIAL HISTORY:   Patient lives with *** at ***.   Smoking status:  Drinking:  Drug Use:     ROS: ***  Constitutional: No fever, weight loss or fatigue  Eyes: No eye pain, visual disturbances, or discharge  ENMT:  No difficulty hearing, tinnitus; No sinus or throat pain  Neck: No pain or stiffness  Respiratory: No cough, wheezing, chills or hemoptysis  Cardiovascular: No chest pain, palpitations, shortness of breath, or leg swelling  Gastrointestinal: No abdominal pain. No nausea, vomiting or hematemesis; No diarrhea or constipation. Nohematochezia.  Genitourinary: No dysuria, frequency, hematuria or incontinence  Neurological: As per HPI  Skin: No itching, burning, rashes or lesions   Endocrine: No heat or cold intolerance; No hair loss  Musculoskeletal: No joint pain or swelling; No muscle, back or extremity pain  Heme/Lymph: No easy bruising or bleeding gums    MEDICATIONS  (STANDING):  amoxicillin  500 milliGRAM(s)/clavulanate 1 Tablet(s) Oral every 12 hours  benzocaine 15 mG/menthol 3.6 mG Lozenge 1 Lozenge Oral daily  brivaracetam 50 milliGRAM(s) Oral every 12 hours  melatonin 10 milliGRAM(s) Oral at bedtime  metoprolol tartrate 50 milliGRAM(s) Oral two times a day  pantoprazole    Tablet 40 milliGRAM(s) Oral before breakfast  polyethylene glycol 3350 17 Gram(s) Oral two times a day  senna 2 Tablet(s) Oral at bedtime    MEDICATIONS  (PRN):  acetaminophen     Tablet .. 650 milliGRAM(s) Oral every 6 hours PRN Mild Pain (1 - 3)  bisacodyl 5 milliGRAM(s) Oral daily PRN Constipation  hydrALAZINE Injectable 10 milliGRAM(s) IV Push every 2 hours PRN SBP>160    Allergies    No Known Drug Allergies  strawberry (Hives)    Intolerances      Vital Signs Last 24 Hrs  T(C): 36.6 (08 Nov 2022 09:08), Max: 36.8 (07 Nov 2022 18:20)  T(F): 97.9 (08 Nov 2022 09:08), Max: 98.3 (07 Nov 2022 18:20)  HR: 126 (08 Nov 2022 12:37) (82 - 126)  BP: 117/68 (08 Nov 2022 12:37) (100/66 - 120/75)  BP(mean): --  RR: 18 (08 Nov 2022 12:37) (17 - 18)  SpO2: 96% (08 Nov 2022 12:37) (94% - 97%)    Parameters below as of 08 Nov 2022 12:37  Patient On (Oxygen Delivery Method): room air        Physical exam:  Constitutional: No acute distress, conversant  Eyes: Anicteric sclerae, moist conjunctivae, see below for CNs  Neck: trachea midline, FROM, supple, no thyromegaly or lymphadenopathy  Cardiovascular: Regular rate and rhythm, no murmurs, rubs, or gallops. No carotid bruits.   Pulmonary: Anterior breath sounds clear bilaterally, no crackles or wheezing. No use of accessory muscles  GI: Abdomen soft, non-distended, non-tender  Extremities: Radial and DP pulses +2, no edema    Neurologic:  -Mental status: Awake, alert, oriented to person, place, and time. Speech is fluent with intact naming, repetition, and comprehension, no dysarthria. Recent and remote memory intact. Follows commands. Attention/concentration intact. Fund of knowledge appropriate.  -Cranial nerves:   II: Visual fields are full to confrontation.  III, IV, VI: Extraocular movements are intact without nystagmus. Pupils equally round and reactive to light  V:  Facial sensation V1-V3 equal and intact   VII: Face is symmetric with normal eye closure and smile  VIII: Hearing is bilaterally intact to finger rub  IX, X: Uvula is midline and soft palate rises symmetrically  XI: Head turning and shoulder shrug are intact.  XII: Tongue protrudes midline  Motor: Normal bulk and tone. No pronator drift. Strength bilateral upper extremity 5/5, bilateral lower extremities 5/5.  Rapid alternating movements intact and symmetric  Sensation: Intact to light touch bilaterally. No neglect or extinction on double simultaneous testing.  Coordination: No dysmetria on finger-to-nose and heel-to-shin bilaterally  Reflexes: Downgoing toes bilaterally   Gait: Narrow gait and steady    NIHSS: **** ASPECT Score: ***** ICH Score: ****** (GCS)    Fingerstick Blood Glucose: CAPILLARY BLOOD GLUCOSE      POCT Blood Glucose.: 122 mg/dL (08 Nov 2022 12:40)    LABS:                        9.1    10.15 )-----------( 220      ( 08 Nov 2022 13:46 )             28.2     11-08    136  |  105  |  20  ----------------------------<  97  4.1   |  22  |  0.74    Ca    8.0<L>      08 Nov 2022 13:46  Phos  3.3     11-08  Mg     2.1     11-08    TPro  5.5<L>  /  Alb  2.9<L>  /  TBili  <0.2  /  DBili  x   /  AST  22  /  ALT  26  /  AlkPhos  66  11-08    PT/INR - ( 08 Nov 2022 13:46 )   PT: 14.3 sec;   INR: 1.20          PTT - ( 08 Nov 2022 13:46 )  PTT:27.5 sec  CARDIAC MARKERS ( 08 Nov 2022 13:46 )  x     / 0.01 ng/mL / 83 U/L / x     / 2.3 ng/mL  CARDIAC MARKERS ( 07 Nov 2022 15:19 )  x     / 0.01 ng/mL / x     / x     / x      CARDIAC MARKERS ( 07 Nov 2022 11:07 )  x     / 0.03 ng/mL / 120 U/L / x     / 4.3 ng/mL          RADIOLOGY & ADDITIONAL STUDIES:      -----------------------------------------------------------------------------------------------------------------  IV-tPA (Y/N):    ***                              Bolus time:    Alteplase Dose Verification w/ RN:  Reason IV-tPA not given: ***    **STROKE CODE CONSULT NOTE**    Last known well time/Time of onset of symptoms: 11/8 12:50pm    HPI: 77y Female with PMHx of HTN and left frontal meningioma (presented as memory loss and confusion) s/p bifrontal craniotomy for meningeal resection 10/27/22 on this admission. Stroke code called for unresponsiveness when eating lunch and left sided weakness. Yesterday, patient also had an episode of unresponsives when getting up to use the restroom, attributed to vasovagal episode. /68. NIHSS 23. Exam significant for left facial droop, dysarthria, right gaze that does not cross midline, left arm and leg weakness. Patient slowly improved during course of code. CTH stable compared to prior with left SDH, edema present, which both have improved. CT perfusion with artifact due to motion. CTA with no steno-occlusive disease. After CT scans, patient responding to questions, able to converse, moving all extremities antigravity, no gaze preference noted.     T(C): 36.6 (11-08-22 @ 09:08), Max: 36.8 (11-07-22 @ 18:20)  HR: 126 (11-08-22 @ 12:37) (82 - 126)  BP: 117/68 (11-08-22 @ 12:37) (100/66 - 120/75)  RR: 18 (11-08-22 @ 12:37) (17 - 18)  SpO2: 96% (11-08-22 @ 12:37) (94% - 97%)    PAST MEDICAL & SURGICAL HISTORY:  Meningioma      Hypertension      H/O hyperthyroidism  s/p thyroid surgery, no longer on medication      Status post thyroid surgery          FAMILY HISTORY:      SOCIAL HISTORY:       ROS: unable to due medical condition    MEDICATIONS  (STANDING):  amoxicillin  500 milliGRAM(s)/clavulanate 1 Tablet(s) Oral every 12 hours  benzocaine 15 mG/menthol 3.6 mG Lozenge 1 Lozenge Oral daily  brivaracetam 50 milliGRAM(s) Oral every 12 hours  melatonin 10 milliGRAM(s) Oral at bedtime  metoprolol tartrate 50 milliGRAM(s) Oral two times a day  pantoprazole    Tablet 40 milliGRAM(s) Oral before breakfast  polyethylene glycol 3350 17 Gram(s) Oral two times a day  senna 2 Tablet(s) Oral at bedtime    MEDICATIONS  (PRN):  acetaminophen     Tablet .. 650 milliGRAM(s) Oral every 6 hours PRN Mild Pain (1 - 3)  bisacodyl 5 milliGRAM(s) Oral daily PRN Constipation  hydrALAZINE Injectable 10 milliGRAM(s) IV Push every 2 hours PRN SBP>160    Allergies    No Known Drug Allergies  strawberry (Hives)    Intolerances      Vital Signs Last 24 Hrs  T(C): 36.6 (08 Nov 2022 09:08), Max: 36.8 (07 Nov 2022 18:20)  T(F): 97.9 (08 Nov 2022 09:08), Max: 98.3 (07 Nov 2022 18:20)  HR: 126 (08 Nov 2022 12:37) (82 - 126)  BP: 117/68 (08 Nov 2022 12:37) (100/66 - 120/75)  BP(mean): --  RR: 18 (08 Nov 2022 12:37) (17 - 18)  SpO2: 96% (08 Nov 2022 12:37) (94% - 97%)    Parameters below as of 08 Nov 2022 12:37  Patient On (Oxygen Delivery Method): room air        Physical exam:  Constitutional: Eye closed, no acute distress  Eyes: Anicteric sclerae, moist conjunctivae, see below for CNs  Neck: trachea midline, FROM, supple, no thyromegaly or lymphadenopathy  Extremities: no edema    Neurologic (first assessment, beginning of stroke code)  -Mental status: Eyes closed, does not open eyes to voice nor sternal rub. Groans to noxious, otherwise no verbal output. Rarely follows one step commands (does not open eyes to command, but attempts to keep right arm lifted)  -Cranial nerves:   II: BTT absent   III, IV, VI: Will track target to the right, but does not cross midline. Pupils equally round and reactive to light  VII: Left facial droop  Motor/Sensation: Normal bulk and tone. Left arm with no movement. Right arm antigravity with drift and hits bed,  strength 3/5. B/l LE with no movement to commands, does withdraw to noxious.   NIHSS: 23          Fingerstick Blood Glucose: CAPILLARY BLOOD GLUCOSE      POCT Blood Glucose.: 122 mg/dL (08 Nov 2022 12:40)    LABS:                        9.1    10.15 )-----------( 220      ( 08 Nov 2022 13:46 )             28.2     11-08    136  |  105  |  20  ----------------------------<  97  4.1   |  22  |  0.74    Ca    8.0<L>      08 Nov 2022 13:46  Phos  3.3     11-08  Mg     2.1     11-08    TPro  5.5<L>  /  Alb  2.9<L>  /  TBili  <0.2  /  DBili  x   /  AST  22  /  ALT  26  /  AlkPhos  66  11-08    PT/INR - ( 08 Nov 2022 13:46 )   PT: 14.3 sec;   INR: 1.20          PTT - ( 08 Nov 2022 13:46 )  PTT:27.5 sec  CARDIAC MARKERS ( 08 Nov 2022 13:46 )  x     / 0.01 ng/mL / 83 U/L / x     / 2.3 ng/mL  CARDIAC MARKERS ( 07 Nov 2022 15:19 )  x     / 0.01 ng/mL / x     / x     / x      CARDIAC MARKERS ( 07 Nov 2022 11:07 )  x     / 0.03 ng/mL / 120 U/L / x     / 4.3 ng/mL          RADIOLOGY & ADDITIONAL STUDIES:  < from: CT Brain Stroke Protocol (11.08.22 @ 13:27) >  No new/interval intracranial hemorrhage. Small sites of left inferior   postop hemorrhage are resolved and left tentorial SDH is smaller.    Left frontal lobe edema persists but mass effect shows mild improvement   comparing to 11/1/22.    No demarcated infarct, specifically no right infarct perceived to explain   new left deficit.    < end of copied text >  < from: CT Perfusion w/ Maps w/ IV Cont (11.08.22 @ 13:28) >  IMPRESSION: Examination is degraded by motion. CT perfusion metrics as   above.    < end of copied text >  < from: CT Angio Head w/ IV Cont (11.08.22 @ 13:29) >  Impression: Normal CTA of the intracranial circulation.     < end of copied text >  < from: CT Angio Head w/ IV Cont (11.08.22 @ 13:29) >    Impression: Normal CTA of the extracranial circulation. No evidence of   carotid stenosis.    < end of copied text >      -----------------------------------------------------------------------------------------------------------------  IV-tPA (Y/N):   N                              Bolus time:    Alteplase Dose Verification w/ RN:  Reason IV-tPA not given: post surgery    **STROKE CODE CONSULT NOTE**    Last known well time/Time of onset of symptoms: 11/8 12:50pm    HPI: 77y Female with PMHx of HTN and left frontal meningioma (presented as memory loss and confusion) s/p bifrontal craniotomy for meningeal resection 10/27/22 on this admission. Stroke code called for unresponsiveness when eating lunch and left sided weakness. Yesterday, patient also had an episode of unresponsives when getting up to use the restroom, attributed to vasovagal episode. /68. NIHSS 23. Exam significant for left facial droop, dysarthria, right gaze that does not cross midline, left arm and leg weakness. Patient slowly improved during course of code. CTH stable compared to prior with left SDH, edema present, which both have improved. CT perfusion with artifact due to motion. CTA with no steno-occlusive disease. After CT scans, patient responding to questions, able to converse, moving all extremities antigravity, no gaze preference noted.     T(C): 36.6 (11-08-22 @ 09:08), Max: 36.8 (11-07-22 @ 18:20)  HR: 126 (11-08-22 @ 12:37) (82 - 126)  BP: 117/68 (11-08-22 @ 12:37) (100/66 - 120/75)  RR: 18 (11-08-22 @ 12:37) (17 - 18)  SpO2: 96% (11-08-22 @ 12:37) (94% - 97%)    PAST MEDICAL & SURGICAL HISTORY:  Meningioma      Hypertension      H/O hyperthyroidism  s/p thyroid surgery, no longer on medication      Status post thyroid surgery          FAMILY HISTORY:      SOCIAL HISTORY:       ROS: unable to due medical condition    MEDICATIONS  (STANDING):  amoxicillin  500 milliGRAM(s)/clavulanate 1 Tablet(s) Oral every 12 hours  benzocaine 15 mG/menthol 3.6 mG Lozenge 1 Lozenge Oral daily  brivaracetam 50 milliGRAM(s) Oral every 12 hours  melatonin 10 milliGRAM(s) Oral at bedtime  metoprolol tartrate 50 milliGRAM(s) Oral two times a day  pantoprazole    Tablet 40 milliGRAM(s) Oral before breakfast  polyethylene glycol 3350 17 Gram(s) Oral two times a day  senna 2 Tablet(s) Oral at bedtime    MEDICATIONS  (PRN):  acetaminophen     Tablet .. 650 milliGRAM(s) Oral every 6 hours PRN Mild Pain (1 - 3)  bisacodyl 5 milliGRAM(s) Oral daily PRN Constipation  hydrALAZINE Injectable 10 milliGRAM(s) IV Push every 2 hours PRN SBP>160    Allergies    No Known Drug Allergies  strawberry (Hives)    Intolerances      Vital Signs Last 24 Hrs  T(C): 36.6 (08 Nov 2022 09:08), Max: 36.8 (07 Nov 2022 18:20)  T(F): 97.9 (08 Nov 2022 09:08), Max: 98.3 (07 Nov 2022 18:20)  HR: 126 (08 Nov 2022 12:37) (82 - 126)  BP: 117/68 (08 Nov 2022 12:37) (100/66 - 120/75)  BP(mean): --  RR: 18 (08 Nov 2022 12:37) (17 - 18)  SpO2: 96% (08 Nov 2022 12:37) (94% - 97%)    Parameters below as of 08 Nov 2022 12:37  Patient On (Oxygen Delivery Method): room air        Physical exam:  Constitutional: Eye closed, no acute distress  Eyes: Anicteric sclerae, moist conjunctivae, see below for CNs  Neck: trachea midline, FROM, supple, no thyromegaly or lymphadenopathy  Extremities: no edema    Neurologic (first assessment, beginning of stroke code)  -Mental status: Eyes closed, does not open eyes to voice nor sternal rub. Groans to noxious, otherwise minimal verbal output; oriented to self and place only, dysarthric. Rarely follows one step commands (does not open eyes to command, but attempts to keep right arm lifted)  -Cranial nerves:   II: BTT absent   III, IV, VI: Will track target to the right, but does not cross midline. Pupils equally round and reactive to light  VII: Left facial droop  Motor/Sensation: Normal bulk and tone. Left arm with no movement. Right arm antigravity with drift and hits bed,  strength 3/5. B/l LE with no movement to commands, does withdraw to noxious.   NIHSS: 23      Neurologic (towards end of code/return to room):  -Mental status: Awake, alert, oriented to person, place, not time. Speech fluent minimal dysarthria/clear speech (spoke about [place]man that she ate for lunch). Follows commands.   -Cranial nerves:   II: Visual fields are full to confrontation.  III, IV, VI: Extraocular movements are intact without nystagmus. Pupils equally round and reactive to light  VII: Face is symmetric with normal eye closure and smile  Motor: Normal bulk and tone. No pronator drift. Able to lift at extremities antigravity without drift.   Sensation: Intact to sensation (correctly points to side of sensation), but with left/right confusion.         Fingerstick Blood Glucose: CAPILLARY BLOOD GLUCOSE      POCT Blood Glucose.: 122 mg/dL (08 Nov 2022 12:40)    LABS:                        9.1    10.15 )-----------( 220      ( 08 Nov 2022 13:46 )             28.2     11-08    136  |  105  |  20  ----------------------------<  97  4.1   |  22  |  0.74    Ca    8.0<L>      08 Nov 2022 13:46  Phos  3.3     11-08  Mg     2.1     11-08    TPro  5.5<L>  /  Alb  2.9<L>  /  TBili  <0.2  /  DBili  x   /  AST  22  /  ALT  26  /  AlkPhos  66  11-08    PT/INR - ( 08 Nov 2022 13:46 )   PT: 14.3 sec;   INR: 1.20          PTT - ( 08 Nov 2022 13:46 )  PTT:27.5 sec  CARDIAC MARKERS ( 08 Nov 2022 13:46 )  x     / 0.01 ng/mL / 83 U/L / x     / 2.3 ng/mL  CARDIAC MARKERS ( 07 Nov 2022 15:19 )  x     / 0.01 ng/mL / x     / x     / x      CARDIAC MARKERS ( 07 Nov 2022 11:07 )  x     / 0.03 ng/mL / 120 U/L / x     / 4.3 ng/mL          RADIOLOGY & ADDITIONAL STUDIES:  < from: CT Brain Stroke Protocol (11.08.22 @ 13:27) >  No new/interval intracranial hemorrhage. Small sites of left inferior   postop hemorrhage are resolved and left tentorial SDH is smaller.    Left frontal lobe edema persists but mass effect shows mild improvement   comparing to 11/1/22.    No demarcated infarct, specifically no right infarct perceived to explain   new left deficit.    < end of copied text >  < from: CT Perfusion w/ Maps w/ IV Cont (11.08.22 @ 13:28) >  IMPRESSION: Examination is degraded by motion. CT perfusion metrics as   above.    < end of copied text >  < from: CT Angio Head w/ IV Cont (11.08.22 @ 13:29) >  Impression: Normal CTA of the intracranial circulation.     < end of copied text >  < from: CT Angio Head w/ IV Cont (11.08.22 @ 13:29) >    Impression: Normal CTA of the extracranial circulation. No evidence of   carotid stenosis.    < end of copied text >      -----------------------------------------------------------------------------------------------------------------  IV-tPA (Y/N):   N                              Bolus time:    Alteplase Dose Verification w/ RN:  Reason IV-tPA not given: post surgery

## 2022-11-08 NOTE — PROGRESS NOTE ADULT - PROBLEM SELECTOR PLAN 1
- s/p resection 10/27  - Course complicated by agitation, now improved  - Steroids and seizure prophylaxis per primary team  - Management per primary team    #Toxic encephalopathy  - episode of decreased arousal today and L sided weakness  - no acute findings on imaging, labs stable  - possibly in setting of medications, would hold PM seroquel as sedating agent that patient has received - s/p resection 10/27  - Course complicated by agitation, now improved  - Steroids and seizure prophylaxis per primary team  - Management per primary team    #Toxic vs metabolic encephalopathy  - episode of decreased arousal today and L sided weakness  - no acute findings on imaging, labs stable  - possibly in setting of medications, would hold PM seroquel as sedating agent that patient has received  - metabolic etiology ddx includes seizure given acute onset (per PCA at bedside was eating and acutely became lethargic, L facial droop and L weakness).  Patient's neuro exam improved post CT but remains lethargic, possibly post ictal.  Recommend vEEG for monitoring

## 2022-11-08 NOTE — CHART NOTE - NSCHARTNOTEFT_GEN_A_CORE
Called to patient's bedside around 12:45pm due to change in patient's mental status. Per patient's nurse, patient became unresponsive while eating lunch, was not responding to noxious or verbal stimuli, and found to have new left sided weakness. Patient found to be less responsive and noted to have new L facial droop. At bedside patient was lethargic and not moving her left upper extremity. Noted to have L facial droop, pupils 3-2mm reactive, R gaze preference, not tracking, slurred speech, A&Ox1 (self)< FC RU/RL (squeezes hand, shows thumbs up, wiggles toes), LUE 0/5 to noxious, LLE w/d to pain. Stroke code called. Vitals stable at this time and patient protecting her airway, O2 sat >94%. Taken for CTH/CTA/CTP stroke protocol. Findings negative for stroke, after CT patient more arousable with resolution of L facial and LUE/LLE weakness. Labs sent, will f/u Na and lactate. Case discussed with Dr. Garnica, consider EEG monitoring.

## 2022-11-08 NOTE — CONSULT NOTE ADULT - ASSESSMENT
77y Female with PMHx of HTN and left frontal meningioma (presented as memory loss and confusion) s/p bifrontal craniotomy for meningeal resection 10/27/22 on this admission. Stroke code  called for unresponsiveness when eating lunch and left sided weakness, NIHSS 23. The day prior, patient also had an episode of unresponsives when getting up to use the restroom, attributed to  vasovagal episode. CTH with smaller left tentorial SDH, improved left frontal edema.   Unclear etiology of patient's transient symptoms.   Ramesh's paralysis would expect right sided weakness  - recommend EEG monitoring     77y Female with PMHx of HTN and left frontal meningioma (presented as memory loss and confusion) s/p bifrontal craniotomy for meningeal resection 10/27/22 on this admission. Stroke code  called for unresponsiveness when eating lunch and left sided weakness, NIHSS 23. The day prior, patient also had an episode of unresponsives when getting up to use the restroom, attributed to  vasovagal episode. CTH with smaller left tentorial SDH, improved left frontal edema, R SDH. Unclear etiology of symptoms. Transient symptoms seizure, TIA (although less likely as patient with patent vessels without stenosis/occlusion).   - recommend EEG monitoring  - can start aspirin 81mg daily if cleared by nsgy  - obtain orthostatics  - rest of care per primary team  Case discussed with Dr. Mcdaniel

## 2022-11-08 NOTE — PROGRESS NOTE ADULT - SUBJECTIVE AND OBJECTIVE BOX
SUBJECTIVE:  Patient seen and examined at bedside.  More tangential today, alert to herself.  Neuro exam unchanged.  Comfortable appearing    ROS:  Denies fevers, chills, headache, vision changes, neck pain, cough, SOB, chest pain, Abdominal pain, N/V, dysuria or new rash.  All other ROS negative except as above    Patient later had acute change in mental status and L sided weakness.  Stroke code called.  CT perfusion wnl, CTA wnl, repeat CT with improving edema post op, no new bleed.  ABG wnl, lactate wnl.  Pending cardiac enzymes and CMP.  Mental status gradually returning to normal    Vital Signs Last 12 Hrs  T(F): 97.9 (11-08-22 @ 09:08), Max: 97.9 (11-08-22 @ 09:08)  HR: 126 (11-08-22 @ 12:37) (89 - 126)  BP: 117/68 (11-08-22 @ 12:37) (108/68 - 117/68)  BP(mean): --  RR: 18 (11-08-22 @ 12:37) (18 - 18)  SpO2: 96% (11-08-22 @ 12:37) (94% - 96%)  I&O's Summary    07 Nov 2022 07:01  -  08 Nov 2022 07:00  --------------------------------------------------------  IN: 1000 mL / OUT: 800 mL / NET: 200 mL    08 Nov 2022 07:01  -  08 Nov 2022 14:16  --------------------------------------------------------  IN: 150 mL / OUT: 900 mL / NET: -750 mL        PHYSICAL EXAM:  Constitutional: NAD, comfortable in bed.  HEENT: PERRLA, EOMI, MMM, crani incision c/d/i  Neck: Supple  Respiratory: CTA B/L. No w/r/r.   Cardiovascular: RRR, normal S1 and S2, no m/r/g.   Gastrointestinal: +BS, soft NTND, no guarding or rebound tenderness, no palpable masses   Extremities: wwp; no cyanosis, clubbing or edema.   Vascular: Pulses equal and strong throughout.   Neurological: AAOx1 to self, tangential, strength and sensation intact throughout.         LABS:                        9.1    10.15 )-----------( 220      ( 08 Nov 2022 13:46 )             28.2     11-08    138  |  104  |  20  ----------------------------<  86  4.2   |  21<L>  |  0.76    Ca    8.8      08 Nov 2022 08:40  Phos  3.2     11-08  Mg     2.0     11-08      PT/INR - ( 08 Nov 2022 13:46 )   PT: 14.3 sec;   INR: 1.20          PTT - ( 08 Nov 2022 13:46 )  PTT:27.5 sec        RADIOLOGY & ADDITIONAL TESTS:    MEDICATIONS  (STANDING):  amoxicillin  500 milliGRAM(s)/clavulanate 1 Tablet(s) Oral every 12 hours  benzocaine 15 mG/menthol 3.6 mG Lozenge 1 Lozenge Oral daily  brivaracetam 50 milliGRAM(s) Oral every 12 hours  melatonin 10 milliGRAM(s) Oral at bedtime  metoprolol tartrate 50 milliGRAM(s) Oral two times a day  pantoprazole    Tablet 40 milliGRAM(s) Oral before breakfast  polyethylene glycol 3350 17 Gram(s) Oral two times a day  QUEtiapine 12.5 milliGRAM(s) Oral at bedtime  senna 2 Tablet(s) Oral at bedtime    MEDICATIONS  (PRN):  acetaminophen     Tablet .. 650 milliGRAM(s) Oral every 6 hours PRN Mild Pain (1 - 3)  bisacodyl 5 milliGRAM(s) Oral daily PRN Constipation  hydrALAZINE Injectable 10 milliGRAM(s) IV Push every 2 hours PRN SBP>160  QUEtiapine 12.5 milliGRAM(s) Oral daily PRN agitation

## 2022-11-08 NOTE — PROVIDER CONTACT NOTE (CHANGE IN STATUS NOTIFICATION) - SITUATION
PCA alerted nursing staff pt is unresponsive. Unresponsive to sternal rub. New left sided droop. Team notified, stroke code called.

## 2022-11-09 DIAGNOSIS — D72.829 ELEVATED WHITE BLOOD CELL COUNT, UNSPECIFIED: ICD-10-CM

## 2022-11-09 LAB
ANION GAP SERPL CALC-SCNC: 8 MMOL/L — SIGNIFICANT CHANGE UP (ref 5–17)
ANION GAP SERPL CALC-SCNC: 8 MMOL/L — SIGNIFICANT CHANGE UP (ref 5–17)
BUN SERPL-MCNC: 17 MG/DL — SIGNIFICANT CHANGE UP (ref 7–23)
BUN SERPL-MCNC: 19 MG/DL — SIGNIFICANT CHANGE UP (ref 7–23)
CALCIUM SERPL-MCNC: 8.6 MG/DL — SIGNIFICANT CHANGE UP (ref 8.4–10.5)
CALCIUM SERPL-MCNC: 9 MG/DL — SIGNIFICANT CHANGE UP (ref 8.4–10.5)
CHLORIDE SERPL-SCNC: 108 MMOL/L — SIGNIFICANT CHANGE UP (ref 96–108)
CHLORIDE SERPL-SCNC: 109 MMOL/L — HIGH (ref 96–108)
CO2 SERPL-SCNC: 23 MMOL/L — SIGNIFICANT CHANGE UP (ref 22–31)
CO2 SERPL-SCNC: 24 MMOL/L — SIGNIFICANT CHANGE UP (ref 22–31)
CREAT SERPL-MCNC: 0.67 MG/DL — SIGNIFICANT CHANGE UP (ref 0.5–1.3)
CREAT SERPL-MCNC: 0.71 MG/DL — SIGNIFICANT CHANGE UP (ref 0.5–1.3)
EGFR: 89 ML/MIN/1.73M2 — SIGNIFICANT CHANGE UP
EGFR: 91 ML/MIN/1.73M2 — SIGNIFICANT CHANGE UP
GLUCOSE SERPL-MCNC: 112 MG/DL — HIGH (ref 70–99)
GLUCOSE SERPL-MCNC: 99 MG/DL — SIGNIFICANT CHANGE UP (ref 70–99)
HCT VFR BLD CALC: 34.7 % — SIGNIFICANT CHANGE UP (ref 34.5–45)
HGB BLD-MCNC: 11 G/DL — LOW (ref 11.5–15.5)
MAGNESIUM SERPL-MCNC: 2.2 MG/DL — SIGNIFICANT CHANGE UP (ref 1.6–2.6)
MCHC RBC-ENTMCNC: 22.8 PG — LOW (ref 27–34)
MCHC RBC-ENTMCNC: 31.7 GM/DL — LOW (ref 32–36)
MCV RBC AUTO: 71.8 FL — LOW (ref 80–100)
NRBC # BLD: 0 /100 WBCS — SIGNIFICANT CHANGE UP (ref 0–0)
PHOSPHATE SERPL-MCNC: 3.5 MG/DL — SIGNIFICANT CHANGE UP (ref 2.5–4.5)
PLATELET # BLD AUTO: 295 K/UL — SIGNIFICANT CHANGE UP (ref 150–400)
POTASSIUM SERPL-MCNC: 4.3 MMOL/L — SIGNIFICANT CHANGE UP (ref 3.5–5.3)
POTASSIUM SERPL-MCNC: 4.4 MMOL/L — SIGNIFICANT CHANGE UP (ref 3.5–5.3)
POTASSIUM SERPL-SCNC: 4.3 MMOL/L — SIGNIFICANT CHANGE UP (ref 3.5–5.3)
POTASSIUM SERPL-SCNC: 4.4 MMOL/L — SIGNIFICANT CHANGE UP (ref 3.5–5.3)
RBC # BLD: 4.83 M/UL — SIGNIFICANT CHANGE UP (ref 3.8–5.2)
RBC # FLD: 17.2 % — HIGH (ref 10.3–14.5)
SODIUM SERPL-SCNC: 140 MMOL/L — SIGNIFICANT CHANGE UP (ref 135–145)
SODIUM SERPL-SCNC: 140 MMOL/L — SIGNIFICANT CHANGE UP (ref 135–145)
WBC # BLD: 13.72 K/UL — HIGH (ref 3.8–10.5)
WBC # FLD AUTO: 13.72 K/UL — HIGH (ref 3.8–10.5)

## 2022-11-09 PROCEDURE — 99233 SBSQ HOSP IP/OBS HIGH 50: CPT

## 2022-11-09 PROCEDURE — 99024 POSTOP FOLLOW-UP VISIT: CPT

## 2022-11-09 RX ORDER — SODIUM CHLORIDE 9 MG/ML
3 INJECTION INTRAMUSCULAR; INTRAVENOUS; SUBCUTANEOUS EVERY 6 HOURS
Refills: 0 | Status: DISCONTINUED | OUTPATIENT
Start: 2022-11-09 | End: 2022-11-10

## 2022-11-09 RX ADMIN — Medication 81 MILLIGRAM(S): at 13:10

## 2022-11-09 RX ADMIN — Medication 1 ENEMA: at 19:08

## 2022-11-09 RX ADMIN — SODIUM CHLORIDE 3 GRAM(S): 9 INJECTION INTRAMUSCULAR; INTRAVENOUS; SUBCUTANEOUS at 11:57

## 2022-11-09 RX ADMIN — Medication 1 TABLET(S): at 06:24

## 2022-11-09 RX ADMIN — SENNA PLUS 2 TABLET(S): 8.6 TABLET ORAL at 22:22

## 2022-11-09 RX ADMIN — Medication 50 MILLIGRAM(S): at 22:22

## 2022-11-09 RX ADMIN — SODIUM CHLORIDE 3 GRAM(S): 9 INJECTION INTRAMUSCULAR; INTRAVENOUS; SUBCUTANEOUS at 18:04

## 2022-11-09 RX ADMIN — Medication 2 MILLIGRAM(S): at 06:24

## 2022-11-09 RX ADMIN — SODIUM CHLORIDE 3 GRAM(S): 9 INJECTION INTRAMUSCULAR; INTRAVENOUS; SUBCUTANEOUS at 23:44

## 2022-11-09 RX ADMIN — POLYETHYLENE GLYCOL 3350 17 GRAM(S): 17 POWDER, FOR SOLUTION ORAL at 06:25

## 2022-11-09 RX ADMIN — POLYETHYLENE GLYCOL 3350 17 GRAM(S): 17 POWDER, FOR SOLUTION ORAL at 18:04

## 2022-11-09 RX ADMIN — Medication 2 MILLIGRAM(S): at 18:04

## 2022-11-09 RX ADMIN — LEVETIRACETAM 500 MILLIGRAM(S): 250 TABLET, FILM COATED ORAL at 18:04

## 2022-11-09 RX ADMIN — PANTOPRAZOLE SODIUM 40 MILLIGRAM(S): 20 TABLET, DELAYED RELEASE ORAL at 06:23

## 2022-11-09 RX ADMIN — Medication 1 TABLET(S): at 18:04

## 2022-11-09 RX ADMIN — LEVETIRACETAM 500 MILLIGRAM(S): 250 TABLET, FILM COATED ORAL at 06:23

## 2022-11-09 NOTE — CHART NOTE - NSCHARTNOTEFT_GEN_A_CORE
POD13, BALJINDER, on EEG overnight negative for seizures, 2% stopped and started on salt tabs. EEG negative for seizures and d/c'ed.

## 2022-11-09 NOTE — EEG REPORT - NS EEG TEXT BOX
[facility] Department of Neurology  Inpatient Continuous video-Electroencephalogram    Patient Name:	VICKIE JARRETT    :	1945  MRN:	[id number]    Study Start Date/Time:  2022, 7:08:10 PM  Study End Date/Time:    Referred by: select    Brief Clinical History:  VICKIE JARRETT is a 77 year old Female; study performed to investigate for seizures or markers of epilepsy.    Diagnosis Code:   R56.9 convulsions/seizure  The live video was: unmonitored.    Pertinent Medications:  n/a    Acquisition Details:  Electroencephalography was acquired using a minimum of 21 channels on an [employer] system v 8.5.1 with electrode placement according to the standard International 10-20 system following ACNS (American Clinical Neurophysiology Society) guidelines for Long-Term Video EEG monitoring.  Anterior temporal T1 and T2 electrodes were utilized whenever possible.   The [employer] automated spike & seizure detections were all reviewed in detail, in addition to extensive portions of raw EEG.      Day 1: 2022 @ 7:08:10 PM to next morning @ 07:00 am  Background:  continuous, with predominantly alpha and beta frequencies.  Symmetry:  near continuous irregular delta>theta over bifrontal regions R>L  Posterior Dominant Rhythm:  9 Hz symmetric, well-organized, and well-modulated.  Organization: Absent.  Voltage:  Normal (20+ uV)  Variability: Yes. 		Reactivity: Yes.  N2 sleep: Symmetric, synchronous spindles and K complexes.  Spontaneous Activity:  No epileptiform discharges.  Periodic/rhythmic activity:  None.  Events:  No electrographic seizures or significant clinical events.  Provocations:  Hyperventilation and Photic stimulation: was not performed.    Daily Summary:    Moderate bifrontal slowing (R>L) suggestive of underlying  dysfunction.  No epileptiform activity and no significant clinical events occurred.      Kailyn Flores MD  Attending Neurologist, [facility]ilepsy [facility]     [facility] Department of Neurology  Inpatient Continuous video-Electroencephalogram    Patient Name:	VICKIE JARRETT    :	1945  MRN:	[id number]    Study Start Date/Time:  2022, 7:08:10 PM  Study End Date/Time: ongoing      Brief Clinical History:  VICKIE JARRETT is a 77 year old Female; study performed to investigate for seizures or markers of epilepsy.    Diagnosis Code:   R56.9 convulsions/seizure      Pertinent Medications:  n/a    Acquisition Details:  Electroencephalography was acquired using a minimum of 21 channels on an [employer] system v 8.5.1 with electrode placement according to the standard International 10-20 system following ACNS (American Clinical Neurophysiology Society) guidelines for Long-Term Video EEG monitoring.  Anterior temporal T1 and T2 electrodes were utilized whenever possible.   The [employer] automated spike & seizure detections were all reviewed in detail, in addition to extensive portions of raw EEG.      Day 1: 2022 @ 7:08:10 PM to next morning @ 07:00 am  Background:  continuous, with predominantly alpha and beta frequencies.  Symmetry:  near continuous irregular delta>theta over bifrontal regions R>L  Posterior Dominant Rhythm:  9 Hz symmetric, well-organized, and well-modulated.  Organization: Absent.  Voltage:  Normal (20+ uV)  Variability: Yes. 		Reactivity: Yes.  N2 sleep: Symmetric, synchronous spindles and K complexes.  Spontaneous Activity:  No epileptiform discharges.  Periodic/rhythmic activity:  None.  Events:  No electrographic seizures or significant clinical events.  Provocations:  Hyperventilation and Photic stimulation: was not performed.    Daily Summary:    Moderate bifrontal slowing (R>L) suggestive of underlying  dysfunction.  No epileptiform activity and no significant clinical events occurred.      Kailyn Flores MD  Attending Neurologist, [facility] Epilepsy Program

## 2022-11-09 NOTE — PROGRESS NOTE ADULT - SUBJECTIVE AND OBJECTIVE BOX
Neurology Stroke Progress Note    INTERVAL HPI/OVERNIGHT EVENTS:  Patient seen and examined sitting up in bed with 1:1 at bedside. Patient appears emotional and saying that her heart hurts because she feels she's not doing well. Wakens easily to light stimulation, tangential thoughts.     MEDICATIONS  (STANDING):  amoxicillin  500 milliGRAM(s)/clavulanate 1 Tablet(s) Oral every 12 hours  aspirin  chewable 81 milliGRAM(s) Oral daily  benzocaine 15 mG/menthol 3.6 mG Lozenge 1 Lozenge Oral daily  dexAMETHasone     Tablet 2 milliGRAM(s) Oral every 12 hours  levETIRAcetam 500 milliGRAM(s) Oral two times a day  metoprolol tartrate 50 milliGRAM(s) Oral two times a day  pantoprazole    Tablet 40 milliGRAM(s) Oral before breakfast  polyethylene glycol 3350 17 Gram(s) Oral two times a day  senna 2 Tablet(s) Oral at bedtime  sodium chloride 3 Gram(s) Oral every 6 hours    MEDICATIONS  (PRN):  acetaminophen     Tablet .. 650 milliGRAM(s) Oral every 6 hours PRN Mild Pain (1 - 3)  bisacodyl 5 milliGRAM(s) Oral daily PRN Constipation  hydrALAZINE Injectable 10 milliGRAM(s) IV Push every 2 hours PRN SBP>160      Allergies    No Known Drug Allergies  strawberry (Hives)    Intolerances        Vital Signs Last 24 Hrs  T(C): 36.6 (09 Nov 2022 11:54), Max: 36.8 (09 Nov 2022 09:52)  T(F): 97.9 (09 Nov 2022 11:54), Max: 98.2 (09 Nov 2022 09:52)  HR: 82 (09 Nov 2022 11:54) (76 - 100)  BP: 115/71 (09 Nov 2022 11:54) (102/59 - 115/71)  BP(mean): --  RR: 18 (09 Nov 2022 11:54) (15 - 19)  SpO2: 98% (09 Nov 2022 11:54) (93% - 98%)    Parameters below as of 09 Nov 2022 11:54  Patient On (Oxygen Delivery Method): room air        Physical exam:  General: No acute distress, awake and alert  Eyes: Anicteric sclerae, moist conjunctivae, see below for CNs  Neck: trachea midline, FROM, supple, no thyromegaly or lymphadenopathy  Cardiovascular: Regular rate and rhythm, no murmurs, rubs, or gallops. No carotid bruits.   Pulmonary: Anterior breath sounds clear bilaterally, no crackles or wheezing. No use of accessory muscles  GI: Abdomen soft, non-distended, non-tender  Extremities: Radial and DP pulses +2, no edema    Neurologic:  -Mental status: Awake, alert, oriented to person. Speech is tangential with inappropriate naming, no dysarthria.  Follows commands appropriately. Attention/concentration diminished.   -Cranial nerves:   II: Visual fields are full to confrontation.  III, IV, VI: Extraocular movements are intact without nystagmus. Pupils equally round and reactive to light  V:  Facial sensation V1-V3 equal and intact   VII: Face is symmetric with normal smile  Motor: Normal bulk and tone. No pronator drift. Strength bilateral upper extremity 5/5, bilateral lower extremities 5/5.  Rapid alternating movements intact and symmetric  Sensation: Sensation decreased on the right. No neglect or extinction on double simultaneous testing.  Coordination: No dysmetria on finger-to-nose       LABS:                        11.0   13.72 )-----------( 295      ( 09 Nov 2022 07:07 )             34.7     11-09    140  |  108  |  17  ----------------------------<  99  4.4   |  24  |  0.71    Ca    9.0      09 Nov 2022 07:07  Phos  3.5     11-09  Mg     2.2     11-09    TPro  5.5<L>  /  Alb  2.9<L>  /  TBili  <0.2  /  DBili  x   /  AST  22  /  ALT  26  /  AlkPhos  66  11-08    PT/INR - ( 08 Nov 2022 13:46 )   PT: 14.3 sec;   INR: 1.20          PTT - ( 08 Nov 2022 13:46 )  PTT:27.5 sec      RADIOLOGY & ADDITIONAL TESTS:    < from: CT Brain Stroke Protocol (11.08.22 @ 13:27) >  IMPRESSION:    No new/interval intracranial hemorrhage. Small sites of left inferior   postop hemorrhage are resolved and left tentorial SDH is smaller.    Left frontal lobe edema persists but mass effect shows mild improvement   comparing to 11/1/22.    No demarcated infarct, specifically no right infarct perceived to explain   new left deficit.    < end of copied text >

## 2022-11-09 NOTE — PROGRESS NOTE ADULT - PROBLEM SELECTOR PLAN 1
- s/p resection 10/27  - Course complicated by agitation, now improved  - Steroids and seizure prophylaxis per primary team  - Management per primary team    #Toxic vs metabolic encephalopathy  - episode of decreased arousal 11/8 and L sided weakness  - no acute findings on imaging, labs stable  - possibly in setting of medications, would continue to hold seroquel  - metabolic etiology ddx includes seizure given acute onset (per PCA at bedside was eating and acutely became lethargic, L facial droop and L weakness).  Patient's neuro exam improved post CT but remains lethargic, possibly post ictal  - s/p Keppra, no events on EEG at this time, generalized slowing  - consider psych for depressed mood

## 2022-11-09 NOTE — PROGRESS NOTE ADULT - SUBJECTIVE AND OBJECTIVE BOX
SUBJECTIVE:  Patient seen and examined at bedside.  Tearful this AM, feels she is not progressing.  Strength exam back to baseline.  Still more lethargic, often tangential.  Oriented to herself and part of hospitalization    ROS:  Denies fevers, chills, headache, vision changes, neck pain, cough, SOB, chest pain, Abdominal pain, N/V, dysuria or new rash.  All other ROS negative except as above    Vital Signs Last 12 Hrs  T(F): 97.9 (11-09-22 @ 11:54), Max: 98.2 (11-09-22 @ 09:52)  HR: 82 (11-09-22 @ 11:54) (76 - 86)  BP: 115/71 (11-09-22 @ 11:54) (102/59 - 115/71)  BP(mean): --  RR: 18 (11-09-22 @ 11:54) (15 - 19)  SpO2: 98% (11-09-22 @ 11:54) (93% - 98%)  I&O's Summary    08 Nov 2022 07:01  -  09 Nov 2022 07:00  --------------------------------------------------------  IN: 375 mL / OUT: 1300 mL / NET: -925 mL        PHYSICAL EXAM:  Constitutional: NAD, comfortable in bed.  HEENT: PERRLA, EOMI, MMM, crani incision c/d/i, EEG in place  Neck: Supple  Respiratory: CTA B/L. No w/r/r.   Cardiovascular: RRR, normal S1 and S2, no m/r/g.   Gastrointestinal: +BS, soft NTND, no guarding or rebound tenderness, no palpable masses   Extremities: wwp; no cyanosis, clubbing or edema.   Vascular: Pulses equal and strong throughout.   Neurological: AAOx1 to self, tangential, strength and sensation intact throughout.        LABS:                        11.0   13.72 )-----------( 295      ( 09 Nov 2022 07:07 )             34.7     11-09    140  |  108  |  17  ----------------------------<  99  4.4   |  24  |  0.71    Ca    9.0      09 Nov 2022 07:07  Phos  3.5     11-09  Mg     2.2     11-09    TPro  5.5<L>  /  Alb  2.9<L>  /  TBili  <0.2  /  DBili  x   /  AST  22  /  ALT  26  /  AlkPhos  66  11-08    PT/INR - ( 08 Nov 2022 13:46 )   PT: 14.3 sec;   INR: 1.20          PTT - ( 08 Nov 2022 13:46 )  PTT:27.5 sec        RADIOLOGY & ADDITIONAL TESTS:    MEDICATIONS  (STANDING):  amoxicillin  500 milliGRAM(s)/clavulanate 1 Tablet(s) Oral every 12 hours  aspirin  chewable 81 milliGRAM(s) Oral daily  benzocaine 15 mG/menthol 3.6 mG Lozenge 1 Lozenge Oral daily  dexAMETHasone     Tablet 2 milliGRAM(s) Oral every 12 hours  levETIRAcetam 500 milliGRAM(s) Oral two times a day  metoprolol tartrate 50 milliGRAM(s) Oral two times a day  pantoprazole    Tablet 40 milliGRAM(s) Oral before breakfast  polyethylene glycol 3350 17 Gram(s) Oral two times a day  senna 2 Tablet(s) Oral at bedtime  sodium chloride 3 Gram(s) Oral every 6 hours    MEDICATIONS  (PRN):  acetaminophen     Tablet .. 650 milliGRAM(s) Oral every 6 hours PRN Mild Pain (1 - 3)  bisacodyl 5 milliGRAM(s) Oral daily PRN Constipation  hydrALAZINE Injectable 10 milliGRAM(s) IV Push every 2 hours PRN SBP>160

## 2022-11-09 NOTE — PROGRESS NOTE ADULT - NS ATTEND AMEND GEN_ALL_CORE FT
The patient is a 77-year-old female with a history of left frontal meningioma status post bifrontal craniotomy with meningioma resection on 10/27.  Postoperatively, the patient has been intermittently confused. Course complicated by episode of presumed syncope and subsequent episode of decreased responsiveness, R gaze preference, and L Hemiparesis, now resolved. Etiology unclear. TIA vs post-ictal seems most likely. EEG negative for epileptiform discharges. Continue sz ppx.  If no good explanation for symptoms, patient would benefit from TIA workup- TTE; continue aspirin.

## 2022-11-09 NOTE — PROGRESS NOTE ADULT - ASSESSMENT
77y Female with PMHx of HTN and left frontal meningioma (presented as memory loss and confusion) s/p bifrontal craniotomy for meningeal resection 10/27/22 on this admission. Stroke code  called for unresponsiveness when eating lunch and left sided weakness, NIHSS 23. The day prior, patient also had an episode of unresponsives when getting up to use the restroom, attributed to  vasovagal episode. CTH with smaller left tentorial SDH, improved left frontal edema, R SDH. Unclear etiology of symptoms. Patient on EEG overnight, it was read as negative and d/c'd today.   With unremarkable EEG it is reasonable to pursue a TIA workup.  -Consider obtaining MRI  -Obtain TTE  -If patient continues to be agitated/emotionally labile, consider switching off Keppra  -c/w ASA 81mg daily

## 2022-11-09 NOTE — PROGRESS NOTE ADULT - PROBLEM SELECTOR PLAN 3
Possibly reactive vs hemoconcentration as all cell lines up  - no signs of new infection  - will continue to trend

## 2022-11-09 NOTE — PROGRESS NOTE ADULT - SUBJECTIVE AND OBJECTIVE BOX
HPI:  77 year old female with pmh HTN who lives in [place] presents with left frontal mass, likely meningioma. Per son, meningioma was diagnosed upon CT head performed for memory loss and confusion. Son reports that confusion has been ongoing for several months. Patient admits to mild headache but denies diplopia, blurry vision, nausea, vomiting, extremity numbness or weakness and slurred speech. Patient is preop for bifrontal craniotomy for meningioma resection 10/27.   (26 Oct 2022 10:58)    10/27: POD# 0 S/P bifrontal craniotomy for removal of skull base mass (frozen: meningioma.) Postop, Pt reports mild headache otherwise neurologically stable. Waxing/waning neuro exam. CTH stable, but with significant edema. Na 143, given 250cc 3% bolus.   10/28: POD1. CTH overnight stable. Oxycodone dc'd due to intermittent lethargy. He removed pending TOV. Failed TOV, straight cath prn.   10/29: POD2. Neuro stable. JPx2. Agitated overnight, precedex started transiently. SBP drop, precedex dc'd and 1L bolus given. Started on seroquel 12.5 at bedtime prn.  10/30: POD3, given additional 12.5 mg seroquel and placed on wrist restraints overnight for agitation. Wrist restraints removed. JUN # 2 removed. Metoprolol increased to 50 BID. Iron studies ordered for microcytic anemia.    10/31: POD4 BALJINDER overnight   11/1: POD5. patient agitated overnight attemtping to leave hospital, security at bedside. ordered 1:1 supervision. psych consulted, recc 10mg melatonin qd, seroquel 100mg BID and zyprexa up to 30mg/day. patient on restraints: b/l wrist/mittens, and soft vest./98 this morning hydral given, pending nutrition consult, upgraded to ICU for decreased level of arousal   11/2: POD 6 mening resection, ICU overnight, mental status improved but not fully back to post op baseline per son. CTH in AM stable, moved to SDU. Seroquel dose decreased per  reccomendations.   11/3: POD 7 meningioma resection. mental status improving.   11/4: POD 8 meningioma resection   11/5: POD 9 meningioma resection. Staples in place. BALJINDER overnight.   11/6: POD 10 resection. BALJINDER overnight.   11/7: POD 11 rsxn, BALJINDER overnight. Pt was in the bathroom brushing her teeth with her daughter with her when she suddenly felt light headed and became unresponsive, the nurse called a Rapid response and patient was brought back to the bed. Vitals were assessed at this time, FS was normal 118, pt was satting 95 on room air, BP was in the systolic 90's, 1 L normal saline was given. Pt returned to her neurologic baseline of AOx1 (self). EKG obtained, BMP/lactate/cardiac enzymes drawn. Pt remains in bed with 1 L bolus over a pressure bag. Dr. Garnica was notified and the hospitalist Dr. Cruz was at bedside. Lactate 3.5, given additional 1 L bolus. Lactate normalized to 1.2. Troponin normalized. Pt back to neurologic baseline.   11/8: POD12. BALJINDER overnight. No issues with vitals overnight, patient feels well this am. Staples removed from bicoronal crani incision, healing well.  Stroke code for change in mental status and new L facial, L weakness, workup wnl, also with new gaze preference, stroke following started on Asa 81, Decadron restarted, Briviat changed to Keppra and EEG monitoring   11/9: POD13, BALJINDER, on EEG overnight, neuro improved yet sleepier than usual, on 2% @25/hr NACL with increasing sodium, maintained current rate       OVERNIGHT EVENTS: no acute events, remains on EEG an 2% @25/hr   Vital Signs Last 24 Hrs  T(C): 36.7 (08 Nov 2022 22:18), Max: 36.7 (08 Nov 2022 22:18)  T(F): 98.1 (08 Nov 2022 22:18), Max: 98.1 (08 Nov 2022 22:18)  HR: 100 (08 Nov 2022 22:18) (89 - 126)  BP: 115/64 (08 Nov 2022 22:18) (108/68 - 119/69)  BP(mean): --  RR: 16 (08 Nov 2022 22:18) (16 - 18)  SpO2: 96% (08 Nov 2022 22:18) (94% - 96%)    Parameters below as of 08 Nov 2022 22:18  Patient On (Oxygen Delivery Method): room air        I&O's Summary    07 Nov 2022 07:01  -  08 Nov 2022 07:00  --------------------------------------------------------  IN: 1000 mL / OUT: 800 mL / NET: 200 mL    08 Nov 2022 07:01  -  09 Nov 2022 00:49  --------------------------------------------------------  IN: 150 mL / OUT: 900 mL / NET: -750 mL      PHYSICAL EXAM:  General: patient seen laying supine in bed in NAD  Neuro: AAOx2 (self, hospital), FC, OE spontaneously, speech confused, CNII-XI grossly intact, face symmetric, no pronator drift, strength 5/5 b/l UE and LE, sensation grossly intact to light touch throughout  HEENT: PERRL, EOMI  Neck: supple  Cardiac: RRR, S1S2  Pulmonary: chest rise symmetric  Abdomen: soft, nontender, nondistended  Ext: perfusing well  Skin: warm, dry  Wound: bicoronal incision c/d/i (staples out)     LABS:                        9.1    10.15 )-----------( 220      ( 08 Nov 2022 13:46 )             28.2     11-08    138  |  106  |  24<H>  ----------------------------<  109<H>  4.5   |  25  |  0.76    Ca    8.3<L>      08 Nov 2022 22:58  Phos  3.3     11-08  Mg     2.1     11-08    TPro  5.5<L>  /  Alb  2.9<L>  /  TBili  <0.2  /  DBili  x   /  AST  22  /  ALT  26  /  AlkPhos  66  11-08    PT/INR - ( 08 Nov 2022 13:46 )   PT: 14.3 sec;   INR: 1.20          PTT - ( 08 Nov 2022 13:46 )  PTT:27.5 sec    CARDIAC MARKERS ( 08 Nov 2022 13:46 )  x     / 0.01 ng/mL / 83 U/L / x     / 2.3 ng/mL  CARDIAC MARKERS ( 07 Nov 2022 15:19 )  x     / 0.01 ng/mL / x     / x     / x      CARDIAC MARKERS ( 07 Nov 2022 11:07 )  x     / 0.03 ng/mL / 120 U/L / x     / 4.3 ng/mL      CAPILLARY BLOOD GLUCOSE      POCT Blood Glucose.: 122 mg/dL (08 Nov 2022 12:40)      Drug Levels: [] N/A    CSF Analysis: [] N/A      Allergies    No Known Drug Allergies  strawberry (Hives)    Intolerances      MEDICATIONS:  Antibiotics:  amoxicillin  500 milliGRAM(s)/clavulanate 1 Tablet(s) Oral every 12 hours    Neuro:  acetaminophen     Tablet .. 650 milliGRAM(s) Oral every 6 hours PRN  levETIRAcetam 500 milliGRAM(s) Oral two times a day    Anticoagulation:  aspirin  chewable 81 milliGRAM(s) Oral daily    OTHER:  benzocaine 15 mG/menthol 3.6 mG Lozenge 1 Lozenge Oral daily  bisacodyl 5 milliGRAM(s) Oral daily PRN  dexAMETHasone     Tablet 2 milliGRAM(s) Oral every 12 hours  hydrALAZINE Injectable 10 milliGRAM(s) IV Push every 2 hours PRN  metoprolol tartrate 50 milliGRAM(s) Oral two times a day  pantoprazole    Tablet 40 milliGRAM(s) Oral before breakfast  polyethylene glycol 3350 17 Gram(s) Oral two times a day  senna 2 Tablet(s) Oral at bedtime    IVF:  sodium chloride 2% . 1000 milliLiter(s) IV Continuous <Continuous>  77 year old female with pmh HTN found to have left frontal brain mass, likely meningioma, on workup for confusion and memory loss. Patient is now s/p bifrontal craniotomy for meningioma resection (frozen: meningioma) 10/27/22    Plan:  Neuro:  - neuro checks/vital signs q4  - pain control PRN  - Bivirat 50 BID changed to keppra 500 BID 11/8  - dex 2 BID  - decadron 2q12  - postop MRI brain done 10/30  - CTH 11/8 ( stroke code) stable but persistent edema, no new acute findings   - EEG 11/8, f/u read   - agitation: no meds prn currently d/t recent changes in mental status     Cardiac:  - -140  - holding home lisinopril d/t hypotension   - Increased home metoprolol to 50mg BID 10/30    Pulmonary:  - on RA, IS    GI:  - Regular diet  - bowel regimen  - PPI while on steroids    Renal:  - voiding  - straight cath PRN   - 2% @ 25    Heme:  - SCDs, SQL   - LE dopplers 10/26: neg for DVT  - Microcytic anemia: f/u iron studies     Endo:  - A1C 5.1, no issues    ID:  - afebrile, trend leukocytosis  - augmentin x 10 days (10/31-11/10) per Roseline     Disposition: SDU status, full code, PT/OT recs AR, Family updated with plan    Assessment and Plan d/w Dr. Garnica

## 2022-11-09 NOTE — PROGRESS NOTE ADULT - PROBLEM SELECTOR PLAN 6
- Anemia with downtrending Hgb since admission of 11.2  - Stable  - No evidence of acute blood loss at this time, likely in the post operative state    DVT prophy: lovenox  Dispo: AR vs home with family assistance pending clinical improvement

## 2022-11-10 DIAGNOSIS — I63.9 CEREBRAL INFARCTION, UNSPECIFIED: ICD-10-CM

## 2022-11-10 LAB
ANION GAP SERPL CALC-SCNC: 9 MMOL/L — SIGNIFICANT CHANGE UP (ref 5–17)
BUN SERPL-MCNC: 23 MG/DL — SIGNIFICANT CHANGE UP (ref 7–23)
CALCIUM SERPL-MCNC: 8.9 MG/DL — SIGNIFICANT CHANGE UP (ref 8.4–10.5)
CHLORIDE SERPL-SCNC: 112 MMOL/L — HIGH (ref 96–108)
CO2 SERPL-SCNC: 23 MMOL/L — SIGNIFICANT CHANGE UP (ref 22–31)
CREAT SERPL-MCNC: 0.74 MG/DL — SIGNIFICANT CHANGE UP (ref 0.5–1.3)
EGFR: 84 ML/MIN/1.73M2 — SIGNIFICANT CHANGE UP
GLUCOSE SERPL-MCNC: 96 MG/DL — SIGNIFICANT CHANGE UP (ref 70–99)
HCT VFR BLD CALC: 30 % — LOW (ref 34.5–45)
HGB BLD-MCNC: 9.6 G/DL — LOW (ref 11.5–15.5)
MAGNESIUM SERPL-MCNC: 2 MG/DL — SIGNIFICANT CHANGE UP (ref 1.6–2.6)
MCHC RBC-ENTMCNC: 22.6 PG — LOW (ref 27–34)
MCHC RBC-ENTMCNC: 32 GM/DL — SIGNIFICANT CHANGE UP (ref 32–36)
MCV RBC AUTO: 70.6 FL — LOW (ref 80–100)
NRBC # BLD: 0 /100 WBCS — SIGNIFICANT CHANGE UP (ref 0–0)
PHOSPHATE SERPL-MCNC: 3.5 MG/DL — SIGNIFICANT CHANGE UP (ref 2.5–4.5)
PLATELET # BLD AUTO: 267 K/UL — SIGNIFICANT CHANGE UP (ref 150–400)
POTASSIUM SERPL-MCNC: 4 MMOL/L — SIGNIFICANT CHANGE UP (ref 3.5–5.3)
POTASSIUM SERPL-SCNC: 4 MMOL/L — SIGNIFICANT CHANGE UP (ref 3.5–5.3)
RBC # BLD: 4.25 M/UL — SIGNIFICANT CHANGE UP (ref 3.8–5.2)
RBC # FLD: 17.1 % — HIGH (ref 10.3–14.5)
SODIUM SERPL-SCNC: 144 MMOL/L — SIGNIFICANT CHANGE UP (ref 135–145)
WBC # BLD: 11.92 K/UL — HIGH (ref 3.8–10.5)
WBC # FLD AUTO: 11.92 K/UL — HIGH (ref 3.8–10.5)

## 2022-11-10 PROCEDURE — 99233 SBSQ HOSP IP/OBS HIGH 50: CPT

## 2022-11-10 PROCEDURE — 93306 TTE W/DOPPLER COMPLETE: CPT | Mod: 26

## 2022-11-10 PROCEDURE — 70551 MRI BRAIN STEM W/O DYE: CPT | Mod: 26

## 2022-11-10 PROCEDURE — 99024 POSTOP FOLLOW-UP VISIT: CPT

## 2022-11-10 RX ORDER — ENOXAPARIN SODIUM 100 MG/ML
40 INJECTION SUBCUTANEOUS EVERY 24 HOURS
Refills: 0 | Status: DISCONTINUED | OUTPATIENT
Start: 2022-11-10 | End: 2022-11-12

## 2022-11-10 RX ORDER — SODIUM CHLORIDE 9 MG/ML
2 INJECTION INTRAMUSCULAR; INTRAVENOUS; SUBCUTANEOUS EVERY 8 HOURS
Refills: 0 | Status: DISCONTINUED | OUTPATIENT
Start: 2022-11-10 | End: 2022-11-13

## 2022-11-10 RX ORDER — ATORVASTATIN CALCIUM 80 MG/1
40 TABLET, FILM COATED ORAL AT BEDTIME
Refills: 0 | Status: DISCONTINUED | OUTPATIENT
Start: 2022-11-10 | End: 2022-11-18

## 2022-11-10 RX ADMIN — LEVETIRACETAM 500 MILLIGRAM(S): 250 TABLET, FILM COATED ORAL at 06:51

## 2022-11-10 RX ADMIN — LEVETIRACETAM 500 MILLIGRAM(S): 250 TABLET, FILM COATED ORAL at 18:45

## 2022-11-10 RX ADMIN — SODIUM CHLORIDE 2 GRAM(S): 9 INJECTION INTRAMUSCULAR; INTRAVENOUS; SUBCUTANEOUS at 22:38

## 2022-11-10 RX ADMIN — Medication 50 MILLIGRAM(S): at 22:37

## 2022-11-10 RX ADMIN — PANTOPRAZOLE SODIUM 40 MILLIGRAM(S): 20 TABLET, DELAYED RELEASE ORAL at 06:51

## 2022-11-10 RX ADMIN — SODIUM CHLORIDE 2 GRAM(S): 9 INJECTION INTRAMUSCULAR; INTRAVENOUS; SUBCUTANEOUS at 13:59

## 2022-11-10 RX ADMIN — POLYETHYLENE GLYCOL 3350 17 GRAM(S): 17 POWDER, FOR SOLUTION ORAL at 18:45

## 2022-11-10 RX ADMIN — Medication 1 TABLET(S): at 06:51

## 2022-11-10 RX ADMIN — Medication 2 MILLIGRAM(S): at 06:51

## 2022-11-10 RX ADMIN — ATORVASTATIN CALCIUM 40 MILLIGRAM(S): 80 TABLET, FILM COATED ORAL at 22:43

## 2022-11-10 RX ADMIN — Medication 81 MILLIGRAM(S): at 13:59

## 2022-11-10 RX ADMIN — SENNA PLUS 2 TABLET(S): 8.6 TABLET ORAL at 22:37

## 2022-11-10 RX ADMIN — ENOXAPARIN SODIUM 40 MILLIGRAM(S): 100 INJECTION SUBCUTANEOUS at 22:38

## 2022-11-10 RX ADMIN — Medication 2 MILLIGRAM(S): at 18:45

## 2022-11-10 RX ADMIN — POLYETHYLENE GLYCOL 3350 17 GRAM(S): 17 POWDER, FOR SOLUTION ORAL at 06:51

## 2022-11-10 NOTE — PROGRESS NOTE ADULT - SUBJECTIVE AND OBJECTIVE BOX
HPI:  77 year old female with pmh HTN who lives in [place] presents with left frontal mass, likely meningioma. Per son, meningioma was diagnosed upon CT head performed for memory loss and confusion. Son reports that confusion has been ongoing for several months. Patient admits to mild headache but denies diplopia, blurry vision, nausea, vomiting, extremity numbness or weakness and slurred speech. Patient is preop for bifrontal craniotomy for meningioma resection 10/27.   (26 Oct 2022 10:58)    HOSPITAL COURSE:   10/27: POD# 0 S/P bifrontal craniotomy for removal of skull base mass (frozen: meningioma.) Postop, Pt reports mild headache otherwise neurologically stable. Waxing/waning neuro exam. CTH stable, but with significant edema. Na 143, given 250cc 3% bolus.   10/28: POD1. CTH overnight stable. Oxycodone dc'd due to intermittent lethargy. He removed pending TOV. Failed TOV, straight cath prn.   10/29: POD2. Neuro stable. JPx2. Agitated overnight, precedex started transiently. SBP drop, precedex dc'd and 1L bolus given. Started on seroquel 12.5 at bedtime prn.  10/30: POD3, given additional 12.5 mg seroquel and placed on wrist restraints overnight for agitation. Wrist restraints removed. JUN # 2 removed. Metoprolol increased to 50 BID. Iron studies ordered for microcytic anemia.    10/31: POD4 BALJINDER overnight   11/1: POD5. patient agitated overnight attemtping to leave hospital, security at bedside. ordered 1:1 supervision. psych consulted, recc 10mg melatonin qd, seroquel 100mg BID and zyprexa up to 30mg/day. patient on restraints: b/l wrist/mittens, and soft vest./98 this morning hydral given, pending nutrition consult, upgraded to ICU for decreased level of arousal   11/2: POD 6 mening resection, ICU overnight, mental status improved but not fully back to post op baseline per son. CTH in AM stable, moved to SDU. Seroquel dose decreased per  reccomendations.   11/3: POD 7 meningioma resection. mental status improving.   11/4: POD 8 meningioma resection   11/5: POD 9 meningioma resection. Staples in place. BALJINDER overnight.   11/6: POD 10 resection. BALJINDER overnight.   11/7: POD 11 rsxn, BALJINDER overnight. Pt was in the bathroom brushing her teeth with her daughter with her when she suddenly felt light headed and became unresponsive, the nurse called a Rapid response and patient was brought back to the bed. Vitals were assessed at this time, FS was normal 118, pt was satting 95 on room air, BP was in the systolic 90's, 1 L normal saline was given. Pt returned to her neurologic baseline of AOx1 (self). EKG obtained, BMP/lactate/cardiac enzymes drawn. Pt remains in bed with 1 L bolus over a pressure bag. Dr. Garnica was notified and the hospitalist Dr. Cruz was at bedside. Lactate 3.5, given additional 1 L bolus. Lactate normalized to 1.2. Troponin normalized. Pt back to neurologic baseline.   11/8: POD12. BALJINDER overnight. No issues with vitals overnight, patient feels well this am. Staples removed from bicoronal crani incision, healing well.  Stroke code for change in mental status and new L facial, L weakness, workup wnl, also with new gaze preference, stroke following started on Asa 81, Decadron restarted, Briviat changed to Keppra and EEG monitoring   11/9: POD13, BALJINDER, on EEG overnight negative for seizures, 2% stopped and started on salt tabs, repeat sodium stable 140. EEG negative for seizures and d/c'ed.   11/10: POD14, BALJINDER overnight, off EEG (neg for seizure, showed some mod bifrotnal slowing R.L). ASA 81mg QD. Neuro stable. Consider MRI brain. Echo pend. Pend AR.       OVERNIGHT EVENTS:  Vital Signs Last 24 Hrs  T(C): 36.7 (09 Nov 2022 20:27), Max: 36.8 (09 Nov 2022 09:52)  T(F): 98 (09 Nov 2022 20:27), Max: 98.2 (09 Nov 2022 09:52)  HR: 102 (09 Nov 2022 20:27) (76 - 102)  BP: 128/71 (09 Nov 2022 20:27) (102/59 - 128/71)  BP(mean): --  RR: 18 (09 Nov 2022 20:27) (15 - 19)  SpO2: 99% (09 Nov 2022 20:27) (93% - 99%)    Parameters below as of 09 Nov 2022 20:27  Patient On (Oxygen Delivery Method): room air        I&O's Summary    08 Nov 2022 07:01  -  09 Nov 2022 07:00  --------------------------------------------------------  IN: 375 mL / OUT: 1300 mL / NET: -925 mL    09 Nov 2022 07:01  -  10 Nov 2022 01:48  --------------------------------------------------------  IN: 0 mL / OUT: 800 mL / NET: -800 mL        PHYSICAL EXAM:  General: NAD, pt is comfortably laying in bed, A&O x 2-3 (self and place), on RA  HEENT: CN II-XII grossly intact, PERRL 3mm, EOMI b/l, face symmetric, tongue midline, neck FROM  Cardiovascular: RRR, normal S1 and S2   Respiratory: lungs CTAB, no wheezing, rhonchi, or crackles   GI: normoactive BS to auscultation, abd soft, NTND   Neuro: no aphasia, speech clear, no dysmetria, no pronator drift  strength 5/5 throughout all 4 extremities  sensation intact to light touch throughout   Extremities: distal pulses 2+ x4   Wound/incision: bicoronal crani site C/D/I     TUBES/LINES:  [] He  [] Lumbar Drain  [] Wound Drains  [] Others    DIET:  [] NPO  [X] Mechanical  [] Tube feeds    LABS:                        11.0   13.72 )-----------( 295      ( 09 Nov 2022 07:07 )             34.7     11-09    140  |  109<H>  |  19  ----------------------------<  112<H>  4.3   |  23  |  0.67    Ca    8.6      09 Nov 2022 16:58  Phos  3.5     11-09  Mg     2.2     11-09    TPro  5.5<L>  /  Alb  2.9<L>  /  TBili  <0.2  /  DBili  x   /  AST  22  /  ALT  26  /  AlkPhos  66  11-08    PT/INR - ( 08 Nov 2022 13:46 )   PT: 14.3 sec;   INR: 1.20          PTT - ( 08 Nov 2022 13:46 )  PTT:27.5 sec    CARDIAC MARKERS ( 08 Nov 2022 13:46 )  x     / 0.01 ng/mL / 83 U/L / x     / 2.3 ng/mL      CAPILLARY BLOOD GLUCOSE          Drug Levels: [] N/A    CSF Analysis: [] N/A      Allergies    No Known Drug Allergies  strawberry (Hives)    Intolerances      MEDICATIONS:  Antibiotics:  amoxicillin  500 milliGRAM(s)/clavulanate 1 Tablet(s) Oral every 12 hours    Neuro:  acetaminophen     Tablet .. 650 milliGRAM(s) Oral every 6 hours PRN  levETIRAcetam 500 milliGRAM(s) Oral two times a day    Anticoagulation:  aspirin  chewable 81 milliGRAM(s) Oral daily    OTHER:  benzocaine 15 mG/menthol 3.6 mG Lozenge 1 Lozenge Oral daily  bisacodyl 5 milliGRAM(s) Oral daily PRN  dexAMETHasone     Tablet 2 milliGRAM(s) Oral every 12 hours  hydrALAZINE Injectable 10 milliGRAM(s) IV Push every 2 hours PRN  metoprolol tartrate 50 milliGRAM(s) Oral two times a day  pantoprazole    Tablet 40 milliGRAM(s) Oral before breakfast  polyethylene glycol 3350 17 Gram(s) Oral two times a day  senna 2 Tablet(s) Oral at bedtime    IVF:  sodium chloride 3 Gram(s) Oral every 6 hours    CULTURES:    RADIOLOGY & ADDITIONAL TESTS:      ASSESSMENT:  77 year old female with pmh HTN found to have left frontal brain mass, likely meningioma, on workup for confusion and memory loss. Patient is now s/p bifrontal craniotomy for meningioma resection (frozen: meningioma) 10/27/22.       HEADACHE    Handoff    MEWS Score    Meningioma    Hypertension    H/O hyperthyroidism    Brain tumor    Brain tumor    Metabolic encephalopathy    Tension type headache    Meningioma    Hypertension    Preoperative testing    Preoperative examination    Prophylactic measure    Agitation    Anemia due to acute blood loss    Hypotension    Toxic encephalopathy    Leukocytosis    Craniotomy for meningioma    Status post thyroid surgery    HEADACHE    Hypertension    [email or web address]        PLAN:  Neuro:  - neuro checks/vital signs q4  - pain control PRN  - Bivirat 50 BID changed to keppra 500 BID 11/8  - dex 2 BID  - decadron 2q12  - postop MRI brain done 10/30  - CTH 11/8 ( stroke code) stable but persistent edema, no new acute findings   - EEG 11/8, mod bifrontal slowing R>L, no seizures recorded   - agitation: no meds prn currently d/t recent changes in mental status     Cardiac:  - -140  - holding home lisinopril d/t hypotension   - Increased home metoprolol to 50mg BID 10/30  - pend echo for syncopal w/u     Pulmonary:  - on RA, IS    GI:  - Regular diet  - bowel regimen  - PPI while on steroids    Renal:  - voiding  - straight cath PRN   - 2% @ 25 off, cont NaCl tabs     Heme:  - SCDs, SQL   - LE dopplers 10/26: neg for DVT  - Microcytic anemia: f/u iron studies     Endo:  - A1C 5.1, no issues    ID:  - afebrile, trend leukocytosis  - augmentin x 10 days (10/31-11/10) per Roseline     Disposition: tele, full code, PT/OT recs AR, Family updated with plan    Assessment and Plan d/w Dr. Garnica

## 2022-11-10 NOTE — CHART NOTE - NSCHARTNOTEFT_GEN_A_CORE
MRI demonstrated right thalamic recent stroke. Discussed MRI results with patient. Will discuss with Junie on 11/11    Plan:  - TTE w bubble  - ESTEFANÍA  - may require ILR pending echo results  - continue ASA 81  - recommend adding atorvastatin 40mg po qhs    Discussed plan with Dr. Mcdaniel

## 2022-11-10 NOTE — PROGRESS NOTE ADULT - PROBLEM SELECTOR PLAN 2
New thalamic infarct on MRI  - c/w ASA  - started atorvastatin  - TTE wnl, f/u TTE with bubble  - stroke team following

## 2022-11-10 NOTE — PROGRESS NOTE ADULT - SUBJECTIVE AND OBJECTIVE BOX
SUBJECTIVE:  Patient seen and examined at bedside.  Calm, remains tangential.    ROS:  Denies fevers, headache, neck pain, cough, SOB, chest pain, abdominal pain, N/V, dysuria or new rash.  All other ROS negative except as above    Vital Signs Last 12 Hrs  T(F): 98.3 (11-10-22 @ 14:09), Max: 98.3 (11-10-22 @ 14:09)  HR: 88 (11-10-22 @ 14:09) (79 - 92)  BP: 133/98 (11-10-22 @ 14:09) (113/70 - 144/80)  BP(mean): --  RR: 16 (11-10-22 @ 14:09) (15 - 16)  SpO2: 96% (11-10-22 @ 14:09) (94% - 96%)  I&O's Summary    09 Nov 2022 07:01  -  10 Nov 2022 07:00  --------------------------------------------------------  IN: 0 mL / OUT: 800 mL / NET: -800 mL    10 Nov 2022 07:01  -  10 Nov 2022 15:34  --------------------------------------------------------  IN: 360 mL / OUT: 600 mL / NET: -240 mL        PHYSICAL EXAM:  Constitutional: NAD, comfortable in bed.  HEENT: PERRLA, EOMI, mild R gaze deviation. MMM, crani incision c/d/i, EEG in place  Neck: Supple  Respiratory: CTA B/L. No w/r/r.   Cardiovascular: RRR, normal S1 and S2, no m/r/g.   Gastrointestinal: +BS, soft NTND, no guarding or rebound tenderness, no palpable masses   Extremities: wwp; no cyanosis, clubbing or edema.   Vascular: Pulses equal and strong throughout.   Neurological: AAOx1 to self, tangential, strength and sensation intact throughout      LABS:                        9.6    11.92 )-----------( 267      ( 10 Nov 2022 06:43 )             30.0     11-10    144  |  112<H>  |  23  ----------------------------<  96  4.0   |  23  |  0.74    Ca    8.9      10 Nov 2022 06:43  Phos  3.5     11-10  Mg     2.0     11-10              RADIOLOGY & ADDITIONAL TESTS:  < from: MR Head No Cont (11.10.22 @ 12:01) >  IMPRESSION:    Right thalamic recent infarct, new from 10/30/22 as are smaller cerebral   white matter infarcts.    Persistent edema at the left frontal lobe nearby complex postop change   with frontal craniectomy and flap, some of which appears convex to the   left frontal lobe but without diffusion restriction to suggest empyema.    < end of copied text >        MEDICATIONS  (STANDING):  aspirin  chewable 81 milliGRAM(s) Oral daily  atorvastatin 40 milliGRAM(s) Oral at bedtime  benzocaine 15 mG/menthol 3.6 mG Lozenge 1 Lozenge Oral daily  dexAMETHasone     Tablet 2 milliGRAM(s) Oral every 12 hours  enoxaparin Injectable 40 milliGRAM(s) SubCutaneous every 24 hours  levETIRAcetam 500 milliGRAM(s) Oral two times a day  metoprolol tartrate 50 milliGRAM(s) Oral two times a day  pantoprazole    Tablet 40 milliGRAM(s) Oral before breakfast  polyethylene glycol 3350 17 Gram(s) Oral two times a day  senna 2 Tablet(s) Oral at bedtime  sodium chloride 2 Gram(s) Oral every 8 hours    MEDICATIONS  (PRN):  acetaminophen     Tablet .. 650 milliGRAM(s) Oral every 6 hours PRN Mild Pain (1 - 3)  bisacodyl 5 milliGRAM(s) Oral daily PRN Constipation  hydrALAZINE Injectable 10 milliGRAM(s) IV Push every 2 hours PRN SBP>160

## 2022-11-10 NOTE — CHART NOTE - NSCHARTNOTEFT_GEN_A_CORE
POD14, BALJINDER overnight, off EEG (neg for seizure, showed some mod bifrontal slowing R>L). ASA 81mg QD. Neuro stable. MRI brain demonstrating new ischemic strokes, started on atorvastatin, echo with bubble ordered.

## 2022-11-11 LAB
ANION GAP SERPL CALC-SCNC: 8 MMOL/L — SIGNIFICANT CHANGE UP (ref 5–17)
BUN SERPL-MCNC: 13 MG/DL — SIGNIFICANT CHANGE UP (ref 7–23)
CALCIUM SERPL-MCNC: 8.9 MG/DL — SIGNIFICANT CHANGE UP (ref 8.4–10.5)
CHLORIDE SERPL-SCNC: 108 MMOL/L — SIGNIFICANT CHANGE UP (ref 96–108)
CO2 SERPL-SCNC: 26 MMOL/L — SIGNIFICANT CHANGE UP (ref 22–31)
CREAT SERPL-MCNC: 0.69 MG/DL — SIGNIFICANT CHANGE UP (ref 0.5–1.3)
EGFR: 90 ML/MIN/1.73M2 — SIGNIFICANT CHANGE UP
GLUCOSE SERPL-MCNC: 89 MG/DL — SIGNIFICANT CHANGE UP (ref 70–99)
HCT VFR BLD CALC: 30.7 % — LOW (ref 34.5–45)
HGB BLD-MCNC: 9.9 G/DL — LOW (ref 11.5–15.5)
MAGNESIUM SERPL-MCNC: 1.9 MG/DL — SIGNIFICANT CHANGE UP (ref 1.6–2.6)
MCHC RBC-ENTMCNC: 22.7 PG — LOW (ref 27–34)
MCHC RBC-ENTMCNC: 32.2 GM/DL — SIGNIFICANT CHANGE UP (ref 32–36)
MCV RBC AUTO: 70.3 FL — LOW (ref 80–100)
NRBC # BLD: 0 /100 WBCS — SIGNIFICANT CHANGE UP (ref 0–0)
PHOSPHATE SERPL-MCNC: 3.8 MG/DL — SIGNIFICANT CHANGE UP (ref 2.5–4.5)
PLATELET # BLD AUTO: 232 K/UL — SIGNIFICANT CHANGE UP (ref 150–400)
POTASSIUM SERPL-MCNC: 4 MMOL/L — SIGNIFICANT CHANGE UP (ref 3.5–5.3)
POTASSIUM SERPL-SCNC: 4 MMOL/L — SIGNIFICANT CHANGE UP (ref 3.5–5.3)
RBC # BLD: 4.37 M/UL — SIGNIFICANT CHANGE UP (ref 3.8–5.2)
RBC # FLD: 17 % — HIGH (ref 10.3–14.5)
SODIUM SERPL-SCNC: 142 MMOL/L — SIGNIFICANT CHANGE UP (ref 135–145)
WBC # BLD: 8.56 K/UL — SIGNIFICANT CHANGE UP (ref 3.8–10.5)
WBC # FLD AUTO: 8.56 K/UL — SIGNIFICANT CHANGE UP (ref 3.8–10.5)

## 2022-11-11 PROCEDURE — 99233 SBSQ HOSP IP/OBS HIGH 50: CPT | Mod: 25

## 2022-11-11 RX ORDER — MAGNESIUM OXIDE 400 MG ORAL TABLET 241.3 MG
400 TABLET ORAL ONCE
Refills: 0 | Status: COMPLETED | OUTPATIENT
Start: 2022-11-11 | End: 2022-11-11

## 2022-11-11 RX ADMIN — SENNA PLUS 2 TABLET(S): 8.6 TABLET ORAL at 21:49

## 2022-11-11 RX ADMIN — POLYETHYLENE GLYCOL 3350 17 GRAM(S): 17 POWDER, FOR SOLUTION ORAL at 17:53

## 2022-11-11 RX ADMIN — Medication 50 MILLIGRAM(S): at 21:48

## 2022-11-11 RX ADMIN — LEVETIRACETAM 500 MILLIGRAM(S): 250 TABLET, FILM COATED ORAL at 06:10

## 2022-11-11 RX ADMIN — Medication 50 MILLIGRAM(S): at 09:44

## 2022-11-11 RX ADMIN — ENOXAPARIN SODIUM 40 MILLIGRAM(S): 100 INJECTION SUBCUTANEOUS at 21:49

## 2022-11-11 RX ADMIN — LEVETIRACETAM 500 MILLIGRAM(S): 250 TABLET, FILM COATED ORAL at 17:52

## 2022-11-11 RX ADMIN — ATORVASTATIN CALCIUM 40 MILLIGRAM(S): 80 TABLET, FILM COATED ORAL at 21:49

## 2022-11-11 RX ADMIN — BENZOCAINE AND MENTHOL 1 LOZENGE: 5; 1 LIQUID ORAL at 13:00

## 2022-11-11 RX ADMIN — PANTOPRAZOLE SODIUM 40 MILLIGRAM(S): 20 TABLET, DELAYED RELEASE ORAL at 06:11

## 2022-11-11 RX ADMIN — Medication 650 MILLIGRAM(S): at 15:11

## 2022-11-11 RX ADMIN — SODIUM CHLORIDE 2 GRAM(S): 9 INJECTION INTRAMUSCULAR; INTRAVENOUS; SUBCUTANEOUS at 06:10

## 2022-11-11 RX ADMIN — Medication 81 MILLIGRAM(S): at 13:00

## 2022-11-11 RX ADMIN — SODIUM CHLORIDE 2 GRAM(S): 9 INJECTION INTRAMUSCULAR; INTRAVENOUS; SUBCUTANEOUS at 21:49

## 2022-11-11 RX ADMIN — SODIUM CHLORIDE 2 GRAM(S): 9 INJECTION INTRAMUSCULAR; INTRAVENOUS; SUBCUTANEOUS at 15:11

## 2022-11-11 RX ADMIN — Medication 2 MILLIGRAM(S): at 17:53

## 2022-11-11 RX ADMIN — MAGNESIUM OXIDE 400 MG ORAL TABLET 400 MILLIGRAM(S): 241.3 TABLET ORAL at 09:53

## 2022-11-11 RX ADMIN — Medication 2 MILLIGRAM(S): at 06:10

## 2022-11-11 RX ADMIN — POLYETHYLENE GLYCOL 3350 17 GRAM(S): 17 POWDER, FOR SOLUTION ORAL at 06:11

## 2022-11-11 RX ADMIN — Medication 650 MILLIGRAM(S): at 15:45

## 2022-11-11 NOTE — PROGRESS NOTE ADULT - SUBJECTIVE AND OBJECTIVE BOX
Patient was seen and examined at bedside. Pt w/ aide  at bedside   OBJECTIVE:  Vital Signs Last 24 Hrs  T(C): 37.1 (11 Nov 2022 08:49), Max: 37.1 (11 Nov 2022 08:49)  T(F): 98.7 (11 Nov 2022 08:49), Max: 98.7 (11 Nov 2022 08:49)  HR: 73 (11 Nov 2022 08:49) (64 - 91)  BP: 146/72 (11 Nov 2022 08:49) (122/78 - 150/79)  BP(mean): --  RR: 20 (11 Nov 2022 08:49) (10 - 22)  SpO2: 95% (11 Nov 2022 08:49) (92% - 100%)    PHYSICAL EXAM:  Gen: NAD laying in bed; appear comfortable ; AAOx 1 only name not place or time   CV: RRR, +S1/S2, no mumur  Pulm: CTA b/l no wheezing or crackles   Abd: soft, NTND + BS no rebound or guarding       LABS:                        9.9    8.56  )-----------( 232      ( 11 Nov 2022 05:30 )             30.7     11-11    142  |  108  |  13  ----------------------------<  89  4.0   |  26  |  0.69    Ca    8.9      11 Nov 2022 05:30  Phos  3.8     11-11  Mg     1.9     11-11    MEDICATIONS  (STANDING):  aspirin  chewable 81 milliGRAM(s) Oral daily  atorvastatin 40 milliGRAM(s) Oral at bedtime  benzocaine 15 mG/menthol 3.6 mG Lozenge 1 Lozenge Oral daily  dexAMETHasone     Tablet 2 milliGRAM(s) Oral every 12 hours  enoxaparin Injectable 40 milliGRAM(s) SubCutaneous every 24 hours  levETIRAcetam 500 milliGRAM(s) Oral two times a day  metoprolol tartrate 50 milliGRAM(s) Oral two times a day  pantoprazole    Tablet 40 milliGRAM(s) Oral before breakfast  polyethylene glycol 3350 17 Gram(s) Oral two times a day  senna 2 Tablet(s) Oral at bedtime  sodium chloride 2 Gram(s) Oral every 8 hours      A/P: 78 yo F hx of HTN, Hyperthyroidism who presented w/ likely L frontal meningioma and is s/p bifrontal craniotomy with meningioma resection 10/27.   # Meningioma s/p resection 10/27  - Steroids and seizure prophylaxis per primary team  - Management per primary team.    New thalamic infarct on MRI  - c/w ASA and  atorvastatin  - Will  f/u TTE with bubble  - Stroke team following    # Hypertension.   -Continue Metoprolol  -Will continue to  holding lisinopril  - encouraging good PO intake.    #Agitation.   - Will continue to hold seroquel given episodes of nonresponsiveness.    # Anemia due to acute blood loss likely in the post operative state  - No evidence of acute blood loss at this time  - Will continue to monitor H/H     #Dispo:  Likely  Acute rehab  vs home with family assistance pending clinical improvement.

## 2022-11-11 NOTE — PROGRESS NOTE ADULT - SUBJECTIVE AND OBJECTIVE BOX
SUBJECTIVE: Patient reports wanting to get rest. Endorsed to PCA that sometimes she has racing thoughts and would like something for her anxiety. Denies new symptoms at this time.     HOSPITAL COURSE:  10/27: POD# 0 S/P bifrontal craniotomy for removal of skull base mass (frozen: meningioma.) Postop, Pt reports mild headache otherwise neurologically stable. Waxing/waning neuro exam. CTH stable, but with significant edema. Na 143, given 250cc 3% bolus.   10/28: POD1. CTH overnight stable. Oxycodone dc'd due to intermittent lethargy. He removed pending TOV. Failed TOV, straight cath prn.   10/29: POD2. Neuro stable. JPx2. Agitated overnight, precedex started transiently. SBP drop, precedex dc'd and 1L bolus given. Started on seroquel 12.5 at bedtime prn.  10/30: POD3, given additional 12.5 mg seroquel and placed on wrist restraints overnight for agitation. Wrist restraints removed. JUN # 2 removed. Metoprolol increased to 50 BID. Iron studies ordered for microcytic anemia.    10/31: POD4 BALJINDER overnight   11/1: POD5. patient agitated overnight attemtping to leave hospital, security at bedside. ordered 1:1 supervision. psych consulted, recc 10mg melatonin qd, seroquel 100mg BID and zyprexa up to 30mg/day. patient on restraints: b/l wrist/mittens, and soft vest./98 this morning hydral given, pending nutrition consult, upgraded to ICU for decreased level of arousal   11/2: POD 6 mening resection, ICU overnight, mental status improved but not fully back to post op baseline per son. CTH in AM stable, moved to SDU. Seroquel dose decreased per  reccomendations.   11/3: POD 7 meningioma resection. mental status improving.   11/4: POD 8 meningioma resection   11/5: POD 9 meningioma resection. Staples in place. BALJINDER overnight.   11/6: POD 10 resection. BALJINDER overnight.   11/7: POD 11 rsxn, BALJINDER overnight. Pt was in the bathroom brushing her teeth with her daughter with her when she suddenly felt light headed and became unresponsive, the nurse called a Rapid response and patient was brought back to the bed. Vitals were assessed at this time, FS was normal 118, pt was satting 95 on room air, BP was in the systolic 90's, 1 L normal saline was given. Pt returned to her neurologic baseline of AOx1 (self). EKG obtained, BMP/lactate/cardiac enzymes drawn. Pt remains in bed with 1 L bolus over a pressure bag. Dr. Garnica was notified and the hospitalist Dr. Cruz was at bedside. Lactate 3.5, given additional 1 L bolus. Lactate normalized to 1.2. Troponin normalized. Pt back to neurologic baseline.   11/8: POD12. BALJINDER overnight. No issues with vitals overnight, patient feels well this am. Staples removed from bicoronal crani incision, healing well.  Stroke code for change in mental status and new L facial, L weakness, workup wnl, also with new gaze preference, stroke following started on Asa 81, Decadron restarted, Briviat changed to Keppra and EEG monitoring   11/9: POD13, BALJINDER, on EEG overnight negative for seizures, 2% stopped and started on salt tabs, repeat sodium stable 140. EEG negative for seizures and d/c'ed.   11/10: POD14, BALJINDER overnight, off EEG (neg for seizure, showed some mod bifrontal slowing R>L). ASA 81mg QD. Neuro stable. MRI brain demonstrating new ischemic strokes, started on atorvastatin, echo with bubble ordered.   11/11: POD 14, BALJINDER o/n. Pending Echo.     Vital Signs Last 24 Hrs  T(C): 36.4 (10 Nov 2022 22:37), Max: 36.8 (10 Nov 2022 14:09)  T(F): 97.6 (10 Nov 2022 22:37), Max: 98.3 (10 Nov 2022 14:09)  HR: 86 (10 Nov 2022 22:37) (74 - 92)  BP: 142/82 (10 Nov 2022 22:37) (113/70 - 150/79)  BP(mean): --  RR: 22 (10 Nov 2022 22:37) (12 - 22)  SpO2: 92% (10 Nov 2022 22:37) (92% - 100%)    Parameters below as of 10 Nov 2022 22:37  Patient On (Oxygen Delivery Method): room air    I&O's Summary    09 Nov 2022 07:01  -  10 Nov 2022 07:00  --------------------------------------------------------  IN: 0 mL / OUT: 800 mL / NET: -800 mL    10 Nov 2022 07:01  -  11 Nov 2022 01:49  --------------------------------------------------------  IN: 810 mL / OUT: 600 mL / NET: 210 mL    PHYSICAL EXAM:  General: NAD, pt is comfortably laying in bed, A&O x 2-3 (self and place), on RA  HEENT: CN II-XII grossly intact, PERRL 3mm, EOMI b/l, face symmetric, tongue midline, neck FROM  Cardiovascular: RRR, normal S1 and S2   Respiratory: lungs CTAB, no wheezing, rhonchi, or crackles   GI: normoactive BS to auscultation, abd soft, NTND   Neuro: no aphasia, speech clear, no dysmetria, no pronator drift  strength 5/5 throughout all 4 extremities  sensation intact to light touch throughout   Extremities: distal pulses 2+ x4   Wound/incision: bicoronal crani site C/D/I     LABS:                        9.6    11.92 )-----------( 267      ( 10 Nov 2022 06:43 )             30.0     11-10    144  |  112<H>  |  23  ----------------------------<  96  4.0   |  23  |  0.74    Ca    8.9      10 Nov 2022 06:43  Phos  3.5     11-10  Mg     2.0     11-10      CAPILLARY BLOOD GLUCOSE      Drug Levels: [] N/A    CSF Analysis: [] N/A      Allergies    No Known Drug Allergies  strawberry (Hives)    Intolerances      MEDICATIONS:  Antibiotics:    Neuro:  acetaminophen     Tablet .. 650 milliGRAM(s) Oral every 6 hours PRN  levETIRAcetam 500 milliGRAM(s) Oral two times a day    Anticoagulation:  aspirin  chewable 81 milliGRAM(s) Oral daily  enoxaparin Injectable 40 milliGRAM(s) SubCutaneous every 24 hours    OTHER:  atorvastatin 40 milliGRAM(s) Oral at bedtime  benzocaine 15 mG/menthol 3.6 mG Lozenge 1 Lozenge Oral daily  bisacodyl 5 milliGRAM(s) Oral daily PRN  dexAMETHasone     Tablet 2 milliGRAM(s) Oral every 12 hours  hydrALAZINE Injectable 10 milliGRAM(s) IV Push every 2 hours PRN  metoprolol tartrate 50 milliGRAM(s) Oral two times a day  pantoprazole    Tablet 40 milliGRAM(s) Oral before breakfast  polyethylene glycol 3350 17 Gram(s) Oral two times a day  senna 2 Tablet(s) Oral at bedtime    IVF:  sodium chloride 2 Gram(s) Oral every 8 hours    CULTURES:    RADIOLOGY & ADDITIONAL TESTS:      ASSESSMENT:  76 y/o female with h/o HTN s/p bifrontal craniotomy for meningioma resection (10/27/22).     Plan:  Neuro:  - Neuro checks/vital signs q4  - Pain control PRN  - Bivirat 50 BID changed to keppra 500 BID 11/8  - Decadron 2q12  - CTH 11/8 ( stroke code) stable but persistent edema, no new acute findings   - EEG 11/8, mod bifrontal slowing R>L, no seizures recorded, d/c'ed   - MRI brain 11/10 demonstrating thalamic ischemic strokes  - Stroke neuro following: Pend echo with bubble and possible loop recorder depending on findings     Cardiac:  - -140  - Holding home lisinopril d/t hypotension   - Increased home metoprolol to 50 mg BID 10/30  - Echo 11/10: mild LVH EF 75%   - Echo with bubble pending to r/o PFO per stroke  - Atorvastatin 40 mg daily for stroke core measures     Pulmonary:  - on RA, IS    GI:  - Regular diet  - Bowel regimen  - PPI while on steroids    Renal:  - Voiding intermittently, straight cath PRN   - Wean sodium tabs     Heme:  - SCDs, SQL   - LE dopplers 10/26: neg for DVT  - Microcytic anemia: f/u iron studies     Endo:  - A1C 5.1, no issues    ID:  - afebrile, trend leukocytosis  - augmentin x 10 days (10/31-11/10) per Roseline     Disposition:   - Tele, full code  - PT/OT recs AR, family updated with plan    Assessment and Plan d/w Dr. Garnica

## 2022-11-11 NOTE — CHART NOTE - NSCHARTNOTEFT_GEN_A_CORE
POD 14, BALJINDER o/n. Pending Echo. no issues overnight, intermittent headaches and more sleepy today however neuro status at baseline and moving all extremities non-focally, following commands briskly.

## 2022-11-12 LAB
ANION GAP SERPL CALC-SCNC: 7 MMOL/L — SIGNIFICANT CHANGE UP (ref 5–17)
APPEARANCE UR: CLEAR — SIGNIFICANT CHANGE UP
BILIRUB UR-MCNC: NEGATIVE — SIGNIFICANT CHANGE UP
BUN SERPL-MCNC: 20 MG/DL — SIGNIFICANT CHANGE UP (ref 7–23)
CALCIUM SERPL-MCNC: 8.9 MG/DL — SIGNIFICANT CHANGE UP (ref 8.4–10.5)
CHLORIDE SERPL-SCNC: 109 MMOL/L — HIGH (ref 96–108)
CO2 SERPL-SCNC: 26 MMOL/L — SIGNIFICANT CHANGE UP (ref 22–31)
COLOR SPEC: YELLOW — SIGNIFICANT CHANGE UP
CREAT SERPL-MCNC: 0.68 MG/DL — SIGNIFICANT CHANGE UP (ref 0.5–1.3)
DIFF PNL FLD: NEGATIVE — SIGNIFICANT CHANGE UP
EGFR: 91 ML/MIN/1.73M2 — SIGNIFICANT CHANGE UP
GLUCOSE SERPL-MCNC: 95 MG/DL — SIGNIFICANT CHANGE UP (ref 70–99)
GLUCOSE UR QL: NEGATIVE — SIGNIFICANT CHANGE UP
HCT VFR BLD CALC: 30.5 % — LOW (ref 34.5–45)
HCT VFR BLD CALC: 30.9 % — LOW (ref 34.5–45)
HGB BLD-MCNC: 9.8 G/DL — LOW (ref 11.5–15.5)
HGB BLD-MCNC: 9.8 G/DL — LOW (ref 11.5–15.5)
KETONES UR-MCNC: NEGATIVE — SIGNIFICANT CHANGE UP
LEUKOCYTE ESTERASE UR-ACNC: NEGATIVE — SIGNIFICANT CHANGE UP
MAGNESIUM SERPL-MCNC: 2 MG/DL — SIGNIFICANT CHANGE UP (ref 1.6–2.6)
MCHC RBC-ENTMCNC: 22.5 PG — LOW (ref 27–34)
MCHC RBC-ENTMCNC: 22.7 PG — LOW (ref 27–34)
MCHC RBC-ENTMCNC: 31.7 GM/DL — LOW (ref 32–36)
MCHC RBC-ENTMCNC: 32.1 GM/DL — SIGNIFICANT CHANGE UP (ref 32–36)
MCV RBC AUTO: 70 FL — LOW (ref 80–100)
MCV RBC AUTO: 71.7 FL — LOW (ref 80–100)
NITRITE UR-MCNC: NEGATIVE — SIGNIFICANT CHANGE UP
NRBC # BLD: 0 /100 WBCS — SIGNIFICANT CHANGE UP (ref 0–0)
NRBC # BLD: 0 /100 WBCS — SIGNIFICANT CHANGE UP (ref 0–0)
OB PNL STL: NEGATIVE — SIGNIFICANT CHANGE UP
PH UR: 6 — SIGNIFICANT CHANGE UP (ref 5–8)
PHOSPHATE SERPL-MCNC: 3.2 MG/DL — SIGNIFICANT CHANGE UP (ref 2.5–4.5)
PLATELET # BLD AUTO: 246 K/UL — SIGNIFICANT CHANGE UP (ref 150–400)
PLATELET # BLD AUTO: 246 K/UL — SIGNIFICANT CHANGE UP (ref 150–400)
POTASSIUM SERPL-MCNC: 4.2 MMOL/L — SIGNIFICANT CHANGE UP (ref 3.5–5.3)
POTASSIUM SERPL-SCNC: 4.2 MMOL/L — SIGNIFICANT CHANGE UP (ref 3.5–5.3)
PROT UR-MCNC: NEGATIVE MG/DL — SIGNIFICANT CHANGE UP
RBC # BLD: 4.31 M/UL — SIGNIFICANT CHANGE UP (ref 3.8–5.2)
RBC # BLD: 4.36 M/UL — SIGNIFICANT CHANGE UP (ref 3.8–5.2)
RBC # FLD: 17 % — HIGH (ref 10.3–14.5)
RBC # FLD: 17.2 % — HIGH (ref 10.3–14.5)
SODIUM SERPL-SCNC: 142 MMOL/L — SIGNIFICANT CHANGE UP (ref 135–145)
SP GR SPEC: >=1.03 — SIGNIFICANT CHANGE UP (ref 1–1.03)
UROBILINOGEN FLD QL: 0.2 E.U./DL — SIGNIFICANT CHANGE UP
WBC # BLD: 10.3 K/UL — SIGNIFICANT CHANGE UP (ref 3.8–10.5)
WBC # BLD: 11.37 K/UL — HIGH (ref 3.8–10.5)
WBC # FLD AUTO: 10.3 K/UL — SIGNIFICANT CHANGE UP (ref 3.8–10.5)
WBC # FLD AUTO: 11.37 K/UL — HIGH (ref 3.8–10.5)

## 2022-11-12 PROCEDURE — 99221 1ST HOSP IP/OBS SF/LOW 40: CPT

## 2022-11-12 PROCEDURE — 99233 SBSQ HOSP IP/OBS HIGH 50: CPT | Mod: 25

## 2022-11-12 RX ORDER — ASPIRIN/CALCIUM CARB/MAGNESIUM 324 MG
81 TABLET ORAL DAILY
Refills: 0 | Status: DISCONTINUED | OUTPATIENT
Start: 2022-11-12 | End: 2022-11-18

## 2022-11-12 RX ORDER — PANTOPRAZOLE SODIUM 20 MG/1
40 TABLET, DELAYED RELEASE ORAL
Refills: 0 | Status: DISCONTINUED | OUTPATIENT
Start: 2022-11-12 | End: 2022-11-15

## 2022-11-12 RX ORDER — PANTOPRAZOLE SODIUM 20 MG/1
40 TABLET, DELAYED RELEASE ORAL EVERY 12 HOURS
Refills: 0 | Status: DISCONTINUED | OUTPATIENT
Start: 2022-11-12 | End: 2022-11-12

## 2022-11-12 RX ORDER — SODIUM CHLORIDE 9 MG/ML
1000 INJECTION INTRAMUSCULAR; INTRAVENOUS; SUBCUTANEOUS
Refills: 0 | Status: DISCONTINUED | OUTPATIENT
Start: 2022-11-12 | End: 2022-11-12

## 2022-11-12 RX ADMIN — Medication 2 MILLIGRAM(S): at 18:35

## 2022-11-12 RX ADMIN — SENNA PLUS 2 TABLET(S): 8.6 TABLET ORAL at 22:00

## 2022-11-12 RX ADMIN — POLYETHYLENE GLYCOL 3350 17 GRAM(S): 17 POWDER, FOR SOLUTION ORAL at 05:17

## 2022-11-12 RX ADMIN — Medication 50 MILLIGRAM(S): at 22:00

## 2022-11-12 RX ADMIN — SODIUM CHLORIDE 2 GRAM(S): 9 INJECTION INTRAMUSCULAR; INTRAVENOUS; SUBCUTANEOUS at 05:18

## 2022-11-12 RX ADMIN — SODIUM CHLORIDE 2 GRAM(S): 9 INJECTION INTRAMUSCULAR; INTRAVENOUS; SUBCUTANEOUS at 22:00

## 2022-11-12 RX ADMIN — Medication 81 MILLIGRAM(S): at 12:01

## 2022-11-12 RX ADMIN — LEVETIRACETAM 500 MILLIGRAM(S): 250 TABLET, FILM COATED ORAL at 18:35

## 2022-11-12 RX ADMIN — PANTOPRAZOLE SODIUM 40 MILLIGRAM(S): 20 TABLET, DELAYED RELEASE ORAL at 05:17

## 2022-11-12 RX ADMIN — BENZOCAINE AND MENTHOL 1 LOZENGE: 5; 1 LIQUID ORAL at 12:01

## 2022-11-12 RX ADMIN — SODIUM CHLORIDE 75 MILLILITER(S): 9 INJECTION INTRAMUSCULAR; INTRAVENOUS; SUBCUTANEOUS at 15:50

## 2022-11-12 RX ADMIN — Medication 2 MILLIGRAM(S): at 05:16

## 2022-11-12 RX ADMIN — Medication 50 MILLIGRAM(S): at 10:27

## 2022-11-12 RX ADMIN — ATORVASTATIN CALCIUM 40 MILLIGRAM(S): 80 TABLET, FILM COATED ORAL at 22:00

## 2022-11-12 RX ADMIN — LEVETIRACETAM 500 MILLIGRAM(S): 250 TABLET, FILM COATED ORAL at 05:16

## 2022-11-12 RX ADMIN — SODIUM CHLORIDE 2 GRAM(S): 9 INJECTION INTRAMUSCULAR; INTRAVENOUS; SUBCUTANEOUS at 14:15

## 2022-11-12 NOTE — CONSULT NOTE ADULT - ASSESSMENT
78 yo F, HTN, prior hyperthyroid s/p thyroidectomy, presented for meningioma resection, course complicated by CVA on MRI brain  GI consulted for bloody stool    #Anemia, microcytic  #CVA on MRI  #Bloody Stool    DDx: hemorrhoidal, diverticulosis, angiectasias, colorectal neoplasm,, infectious colitis, ischemic bleeding    Recommendations:  Please place on CLD tomorrow  Please order Bowel Prep for ivette evening  Please make NPO sunday MN for colonoscopy monday  Please trend HgB  Please send Iron Studies  Please repeat COVID swab tomorrow    remainder of care per primary    Thank you for the courtesy of this consult. We will follow along with you.    Rodolfo Sierra M.D.  Gastroenterology Fellow  Weekday Pager: [phone]  Weeknights/Weekend Coverage: Please Call the  for contact info

## 2022-11-12 NOTE — PROGRESS NOTE ADULT - SUBJECTIVE AND OBJECTIVE BOX
SUBJECTIVE: Denies new concerns at this time, states she is ready for bed.     HOSPITAL COURSE:  10/27: POD# 0 S/P bifrontal craniotomy for removal of skull base mass (frozen: meningioma.) Postop, Pt reports mild headache otherwise neurologically stable. Waxing/waning neuro exam. CTH stable, but with significant edema. Na 143, given 250cc 3% bolus.   10/28: POD1. CTH overnight stable. Oxycodone dc'd due to intermittent lethargy. He removed pending TOV. Failed TOV, straight cath prn.   10/29: POD2. Neuro stable. JPx2. Agitated overnight, precedex started transiently. SBP drop, precedex dc'd and 1L bolus given. Started on seroquel 12.5 at bedtime prn.  10/30: POD3, given additional 12.5 mg seroquel and placed on wrist restraints overnight for agitation. Wrist restraints removed. JUN # 2 removed. Metoprolol increased to 50 BID. Iron studies ordered for microcytic anemia.    10/31: POD4 BALJINDER overnight   11/1: POD5. patient agitated overnight attemtping to leave hospital, security at bedside. ordered 1:1 supervision. psych consulted, recc 10mg melatonin qd, seroquel 100mg BID and zyprexa up to 30mg/day. patient on restraints: b/l wrist/mittens, and soft vest./98 this morning hydral given, pending nutrition consult, upgraded to ICU for decreased level of arousal   11/2: POD 6 mening resection, ICU overnight, mental status improved but not fully back to post op baseline per son. CTH in AM stable, moved to SDU. Seroquel dose decreased per  reccomendations.   11/3: POD 7 meningioma resection. mental status improving.   11/4: POD 8 meningioma resection   11/5: POD 9 meningioma resection. Staples in place. BALJINDER overnight.   11/6: POD 10 resection. BALJINDER overnight.   11/7: POD 11 rsxn, BALJINDER overnight. Pt was in the bathroom brushing her teeth with her daughter with her when she suddenly felt light headed and became unresponsive, the nurse called a Rapid response and patient was brought back to the bed. Vitals were assessed at this time, FS was normal 118, pt was satting 95 on room air, BP was in the systolic 90's, 1 L normal saline was given. Pt returned to her neurologic baseline of AOx1 (self). EKG obtained, BMP/lactate/cardiac enzymes drawn. Pt remains in bed with 1 L bolus over a pressure bag. Dr. Garnica was notified and the hospitalist Dr. Cruz was at bedside. Lactate 3.5, given additional 1 L bolus. Lactate normalized to 1.2. Troponin normalized. Pt back to neurologic baseline.   11/8: POD12. BALJINDER overnight. No issues with vitals overnight, patient feels well this am. Staples removed from bicoronal crani incision, healing well.  Stroke code for change in mental status and new L facial, L weakness, workup wnl, also with new gaze preference, stroke following started on Asa 81, Decadron restarted, Briviat changed to Keppra and EEG monitoring   11/9: POD13, BALJINDER, on EEG overnight negative for seizures, 2% stopped and started on salt tabs, repeat sodium stable 140. EEG negative for seizures and d/c'ed.   11/10: POD14, BALJINDER overnight, off EEG (neg for seizure, showed some mod bifrontal slowing R>L). ASA 81mg QD. Neuro stable. MRI brain demonstrating new ischemic strokes, started on atorvastatin, echo with bubble ordered.   11/11: POD 14, BALJINDER o/n. Pending Echo. no issues overnight, intermittent headaches and more sleepy today however neuro status at baseline and moving all extremities non-focally, following commands briskly.   11/12: PD 15. BALJINDER o/n.     Vital Signs Last 24 Hrs  T(C): 36.7 (12 Nov 2022 00:20), Max: 37.1 (11 Nov 2022 08:49)  T(F): 98 (12 Nov 2022 00:20), Max: 98.7 (11 Nov 2022 08:49)  HR: 74 (12 Nov 2022 00:20) (64 - 92)  BP: 115/75 (12 Nov 2022 00:20) (115/69 - 146/72)  BP(mean): --  RR: 18 (12 Nov 2022 00:20) (10 - 20)  SpO2: 95% (12 Nov 2022 00:20) (95% - 98%)    Parameters below as of 12 Nov 2022 00:20  Patient On (Oxygen Delivery Method): room air    I&O's Summary    10 Nov 2022 07:01  -  11 Nov 2022 07:00  --------------------------------------------------------  IN: 810 mL / OUT: 1000 mL / NET: -190 mL    11 Nov 2022 07:01  -  12 Nov 2022 02:02  --------------------------------------------------------  IN: 0 mL / OUT: 300 mL / NET: -300 mL    PHYSICAL EXAM:  General: NAD, pt is comfortably laying in bed, A&O x 2-3 (self and place), on RA  HEENT: CN II-XII grossly intact, PERRL 3mm, EOMI b/l, face symmetric, tongue midline, neck FROM  Cardiovascular: RRR, normal S1 and S2   Respiratory: lungs CTAB, no wheezing, rhonchi, or crackles   GI: normoactive BS to auscultation, abd soft, NTND   Neuro: no aphasia, speech clear, no dysmetria, no pronator drift  strength 5/5 throughout all 4 extremities  sensation intact to light touch throughout   Extremities: distal pulses 2+ x 4   Wound/incision: bicoronal crani site C/D/I     LABS:                        9.9    8.56  )-----------( 232      ( 11 Nov 2022 05:30 )             30.7     11-11    142  |  108  |  13  ----------------------------<  89  4.0   |  26  |  0.69    Ca    8.9      11 Nov 2022 05:30  Phos  3.8     11-11  Mg     1.9     11-11              CAPILLARY BLOOD GLUCOSE          Drug Levels: [] N/A    CSF Analysis: [] N/A      Allergies    No Known Drug Allergies  strawberry (Hives)    Intolerances      MEDICATIONS:  Antibiotics:    Neuro:  acetaminophen     Tablet .. 650 milliGRAM(s) Oral every 6 hours PRN  levETIRAcetam 500 milliGRAM(s) Oral two times a day    Anticoagulation:  aspirin  chewable 81 milliGRAM(s) Oral daily  enoxaparin Injectable 40 milliGRAM(s) SubCutaneous every 24 hours    OTHER:  atorvastatin 40 milliGRAM(s) Oral at bedtime  benzocaine 15 mG/menthol 3.6 mG Lozenge 1 Lozenge Oral daily  bisacodyl 5 milliGRAM(s) Oral daily PRN  dexAMETHasone     Tablet 2 milliGRAM(s) Oral every 12 hours  hydrALAZINE Injectable 10 milliGRAM(s) IV Push every 2 hours PRN  metoprolol tartrate 50 milliGRAM(s) Oral two times a day  pantoprazole    Tablet 40 milliGRAM(s) Oral before breakfast  polyethylene glycol 3350 17 Gram(s) Oral two times a day  senna 2 Tablet(s) Oral at bedtime    IVF:  sodium chloride 2 Gram(s) Oral every 8 hours    CULTURES:    RADIOLOGY & ADDITIONAL TESTS:      ASSESSMENT:  78 y/o female with h/o HTN s/p bifrontal craniotomy for meningioma resection (10/27/22).     Plan:  Neuro:  - Neuro checks/vital signs q4  - Pain control PRN  - Bivirat 50 BID changed to keppra 500 BID 11/8  - Decadron 2q12  - CTH 11/8 ( stroke code) stable but persistent edema, no new acute findings   - EEG 11/8, mod bifrontal slowing R>L, no seizures recorded, d/c'ed   - MRI brain 11/10 demonstrating thalamic ischemic strokes  - Stroke neuro following: Pend echo with bubble and possible loop recorder depending on findings     Cardiac:  - -140  - Holding home lisinopril d/t hypotension   - Increased home metoprolol to 50 mg BID 10/30  - Echo 11/10: mild LVH EF 75%   - Echo with bubble pending to r/o PFO per stroke  - Atorvastatin 40 mg daily for stroke core measures     Pulmonary:  - on RA, IS    GI:  - Regular diet  - Bowel regimen  - PPI while on steroids    Renal:  - Voiding intermittently, straight cath PRN   - Wean sodium tabs     Heme:  - SCDs, SQL   - LE dopplers 10/26: neg for DVT  - Microcytic anemia: f/u iron studies     Endo:  - A1C 5.1, no issues    ID:  - afebrile, trend leukocytosis  - augmentin x 10 days (10/31-11/10) per Roseline     Disposition:   - Tele, full code  - PT/OT recs AR, family updated with plan    Assessment and Plan d/w Dr. Garnica

## 2022-11-12 NOTE — CONSULT NOTE ADULT - ATTENDING COMMENTS
76yo W with PMH of hyperthyroidism s/p thyroidectomy who is admitted for stroke, GI is consulted for blood per rectum and drop in Hb.     Agree with plan as above, plan for colonoscopy.    CEDRICK Gibson MD  GI Attending

## 2022-11-12 NOTE — CONSULT NOTE ADULT - SUBJECTIVE AND OBJECTIVE BOX
GASTROENTEROLOGY CONSULT NOTE  HPI:  76 yo F, HTN, prior hyperthyroid s/p thyroidectomy, presented for meningioma resection. Diagnosed by CT in s/o memory loss and confusion.   Since then patient is s/p resection, MRI brain also revealed cva after a code stroke was called  Patient was being set up for rehab, but was noted to have red blood in BM, for which GI was consulted.  Patient's history is limited due to mild expressive aphasia, but endorses being constipated received enema 2 days ago), and denied having a colonoscopy before. Is baffled at the thought of an upper endoscopy    Allergies    No Known Drug Allergies  strawberry (Hives)    Intolerances      Home Medications:  aspirin 81 mg oral tablet, chewable: 0.5 tab(s) orally once a day, As Needed  (last taken over 1 week ago) (26 Oct 2022 14:35)  lisinopril 10 mg oral tablet: 1 tab(s) orally once a day (26 Oct 2022 14:35)  metoprolol succinate 25 mg oral tablet, extended release: 1 tab(s) orally 2 times a day (26 Oct 2022 14:35)    MEDICATIONS:  MEDICATIONS  (STANDING):  aspirin  chewable 81 milliGRAM(s) Oral daily  atorvastatin 40 milliGRAM(s) Oral at bedtime  benzocaine 15 mG/menthol 3.6 mG Lozenge 1 Lozenge Oral daily  dexAMETHasone     Tablet 2 milliGRAM(s) Oral every 12 hours  levETIRAcetam 500 milliGRAM(s) Oral two times a day  metoprolol tartrate 50 milliGRAM(s) Oral two times a day  pantoprazole    Tablet 40 milliGRAM(s) Oral before breakfast  polyethylene glycol 3350 17 Gram(s) Oral two times a day  senna 2 Tablet(s) Oral at bedtime  sodium chloride 2 Gram(s) Oral every 8 hours    MEDICATIONS  (PRN):  acetaminophen     Tablet .. 650 milliGRAM(s) Oral every 6 hours PRN Mild Pain (1 - 3)  bisacodyl 5 milliGRAM(s) Oral daily PRN Constipation  hydrALAZINE Injectable 10 milliGRAM(s) IV Push every 2 hours PRN SBP>160    PAST MEDICAL & SURGICAL HISTORY:  Meningioma      Hypertension      H/O hyperthyroidism  s/p thyroid surgery, no longer on medication      Status post thyroid surgery        FAMILY HISTORY: HTN    REVIEW OF SYSTEMS:  All other 10 review of systems is negative unless indicated above.    Vital Signs Last 24 Hrs  T(C): 36.6 (12 Nov 2022 16:44), Max: 36.7 (12 Nov 2022 00:20)  T(F): 97.9 (12 Nov 2022 16:44), Max: 98.1 (12 Nov 2022 14:23)  HR: 91 (12 Nov 2022 16:44) (66 - 93)  BP: 122/68 (12 Nov 2022 16:44) (103/68 - 122/68)  BP(mean): --  RR: 18 (12 Nov 2022 16:44) (17 - 19)  SpO2: 96% (12 Nov 2022 16:44) (95% - 99%)    Parameters below as of 12 Nov 2022 16:44  Patient On (Oxygen Delivery Method): room air        11-11 @ 07:01  -  11-12 @ 07:00  --------------------------------------------------------  IN: 0 mL / OUT: 1100 mL / NET: -1100 mL        PHYSICAL EXAM:    General: lying in bed, in no acute distress, mild expressive aphasia  HEENT: Neck supple, mmm, craniotomy scars across scalp  Lungs: Normal respiratory effort, no intercostal retractions  Cardiovascular: regular rate  Abdomen: Soft, non-tender non-distended; No rebound or guarding  Extremities: wwp, no cce  Neurological: expressive aphasia, AAOx2 - thought it was 2020  Skin: Warm and dry. No obvious rash    LABS:                        9.8    11.37 )-----------( 246      ( 12 Nov 2022 16:43 )             30.9     11-12    142  |  109<H>  |  20  ----------------------------<  95  4.2   |  26  |  0.68    Ca    8.9      12 Nov 2022 08:41  Phos  3.2     11-12  Mg     2.0     11-12              RADIOLOGY & ADDITIONAL STUDIES:     Reviewed

## 2022-11-12 NOTE — PROGRESS NOTE ADULT - SUBJECTIVE AND OBJECTIVE BOX
Patient was seen and examined at bedside. Case discuss with resident. Pt w/o any complaints this morning.     OBJECTIVE:  Vital Signs Last 24 Hrs  T(C): 36.4 (12 Nov 2022 08:37), Max: 36.7 (12 Nov 2022 00:20)  T(F): 97.6 (12 Nov 2022 08:37), Max: 98 (12 Nov 2022 00:20)  HR: 84 (12 Nov 2022 12:29) (66 - 92)  BP: 108/62 (12 Nov 2022 12:29) (103/68 - 131/87)  BP(mean): --  RR: 17 (12 Nov 2022 12:29) (17 - 20)  SpO2: 95% (12 Nov 2022 12:29) (95% - 99%)    Parameters below as of 12 Nov 2022 12:29  Patient On (Oxygen Delivery Method): room air    PHYSICAL EXAM:  Gen: NAD laying in bed; Pt with aide  at bedside   CV: RRR, +S1/S2, no mumur  Pulm: CTA b/l no wheezing or crackles   Abd: soft, NTND + BS no rebound or guarding       LABS:                        9.8    10.30 )-----------( 246      ( 12 Nov 2022 08:41 )             30.5     11-12    142  |  109<H>  |  20  ----------------------------<  95  4.2   |  26  |  0.68    Ca    8.9      12 Nov 2022 08:41  Phos  3.2     11-12  Mg     2.0     11-12    MEDICATIONS  (STANDING):  aspirin  chewable 81 milliGRAM(s) Oral daily  atorvastatin 40 milliGRAM(s) Oral at bedtime  benzocaine 15 mG/menthol 3.6 mG Lozenge 1 Lozenge Oral daily  dexAMETHasone     Tablet 2 milliGRAM(s) Oral every 12 hours  enoxaparin Injectable 40 milliGRAM(s) SubCutaneous every 24 hours  levETIRAcetam 500 milliGRAM(s) Oral two times a day  metoprolol tartrate 50 milliGRAM(s) Oral two times a day  pantoprazole    Tablet 40 milliGRAM(s) Oral before breakfast  polyethylene glycol 3350 17 Gram(s) Oral two times a day  senna 2 Tablet(s) Oral at bedtime  sodium chloride 2 Gram(s) Oral every 8 hours      A/P: 76 yo F hx of HTN, Hyperthyroidism who presented w/ likely L frontal meningioma and is s/p bifrontal craniotomy with meningioma resection 10/27.     # Meningioma s/p resection 10/27  - Steroids and seizure prophylaxis per primary team  - Management per primary team.    New thalamic infarct on MRI  - c/w ASA and  atorvastatin  - Will  f/u TTE with bubble  - Stroke team following    # Hypertension.   -Continue Metoprolol  -Will continue to  holding lisinopril    #Agitation.   - Will continue to hold seroquel given episodes of nonresponsiveness.    # Anemia due to acute blood loss likely in the post operative state  - No evidence of acute blood loss at this time  - Will continue to monitor H/H     #Dispo:  Likely acute rehab vs home with family assistance pending clinical improvement.

## 2022-11-12 NOTE — CHART NOTE - NSCHARTNOTEFT_GEN_A_CORE
no acute events, neuro stable. pending echo w/ bubble study Pt had BM with katerine bright red blood. HD stable. repeat CBC pending. ASA and SQL d/c, started on protonix bid. GI consulted, recommended keeping her NPO. on NS@75cc/hr. neuro stable.

## 2022-11-13 LAB
ANION GAP SERPL CALC-SCNC: 7 MMOL/L — SIGNIFICANT CHANGE UP (ref 5–17)
APTT BLD: 27.4 SEC — LOW (ref 27.5–35.5)
BLD GP AB SCN SERPL QL: NEGATIVE — SIGNIFICANT CHANGE UP
BUN SERPL-MCNC: 21 MG/DL — SIGNIFICANT CHANGE UP (ref 7–23)
CALCIUM SERPL-MCNC: 8.8 MG/DL — SIGNIFICANT CHANGE UP (ref 8.4–10.5)
CHLORIDE SERPL-SCNC: 110 MMOL/L — HIGH (ref 96–108)
CO2 SERPL-SCNC: 26 MMOL/L — SIGNIFICANT CHANGE UP (ref 22–31)
CREAT SERPL-MCNC: 0.66 MG/DL — SIGNIFICANT CHANGE UP (ref 0.5–1.3)
EGFR: 91 ML/MIN/1.73M2 — SIGNIFICANT CHANGE UP
GLUCOSE SERPL-MCNC: 95 MG/DL — SIGNIFICANT CHANGE UP (ref 70–99)
HCT VFR BLD CALC: 29.9 % — LOW (ref 34.5–45)
HGB BLD-MCNC: 9.4 G/DL — LOW (ref 11.5–15.5)
INR BLD: 1.23 — HIGH (ref 0.88–1.16)
MAGNESIUM SERPL-MCNC: 1.9 MG/DL — SIGNIFICANT CHANGE UP (ref 1.6–2.6)
MCHC RBC-ENTMCNC: 22.6 PG — LOW (ref 27–34)
MCHC RBC-ENTMCNC: 31.4 GM/DL — LOW (ref 32–36)
MCV RBC AUTO: 71.9 FL — LOW (ref 80–100)
NRBC # BLD: 0 /100 WBCS — SIGNIFICANT CHANGE UP (ref 0–0)
PHOSPHATE SERPL-MCNC: 3.7 MG/DL — SIGNIFICANT CHANGE UP (ref 2.5–4.5)
PLATELET # BLD AUTO: 260 K/UL — SIGNIFICANT CHANGE UP (ref 150–400)
POTASSIUM SERPL-MCNC: 3.9 MMOL/L — SIGNIFICANT CHANGE UP (ref 3.5–5.3)
POTASSIUM SERPL-SCNC: 3.9 MMOL/L — SIGNIFICANT CHANGE UP (ref 3.5–5.3)
PROTHROM AB SERPL-ACNC: 14.7 SEC — HIGH (ref 10.5–13.4)
RBC # BLD: 4.16 M/UL — SIGNIFICANT CHANGE UP (ref 3.8–5.2)
RBC # FLD: 17.2 % — HIGH (ref 10.3–14.5)
RH IG SCN BLD-IMP: POSITIVE — SIGNIFICANT CHANGE UP
SARS-COV-2 RNA SPEC QL NAA+PROBE: SIGNIFICANT CHANGE UP
SODIUM SERPL-SCNC: 143 MMOL/L — SIGNIFICANT CHANGE UP (ref 135–145)
WBC # BLD: 9.59 K/UL — SIGNIFICANT CHANGE UP (ref 3.8–10.5)
WBC # FLD AUTO: 9.59 K/UL — SIGNIFICANT CHANGE UP (ref 3.8–10.5)

## 2022-11-13 PROCEDURE — 99233 SBSQ HOSP IP/OBS HIGH 50: CPT

## 2022-11-13 PROCEDURE — 99232 SBSQ HOSP IP/OBS MODERATE 35: CPT

## 2022-11-13 RX ORDER — SODIUM CHLORIDE 9 MG/ML
1000 INJECTION INTRAMUSCULAR; INTRAVENOUS; SUBCUTANEOUS
Refills: 0 | Status: DISCONTINUED | OUTPATIENT
Start: 2022-11-13 | End: 2022-11-15

## 2022-11-13 RX ORDER — SOD SULF/SODIUM/NAHCO3/KCL/PEG
4000 SOLUTION, RECONSTITUTED, ORAL ORAL ONCE
Refills: 0 | Status: COMPLETED | OUTPATIENT
Start: 2022-11-13 | End: 2022-11-13

## 2022-11-13 RX ORDER — SODIUM CHLORIDE 9 MG/ML
1 INJECTION INTRAMUSCULAR; INTRAVENOUS; SUBCUTANEOUS EVERY 8 HOURS
Refills: 0 | Status: DISCONTINUED | OUTPATIENT
Start: 2022-11-13 | End: 2022-11-15

## 2022-11-13 RX ADMIN — BENZOCAINE AND MENTHOL 1 LOZENGE: 5; 1 LIQUID ORAL at 11:57

## 2022-11-13 RX ADMIN — Medication 50 MILLIGRAM(S): at 21:26

## 2022-11-13 RX ADMIN — SENNA PLUS 2 TABLET(S): 8.6 TABLET ORAL at 21:26

## 2022-11-13 RX ADMIN — SODIUM CHLORIDE 1 GRAM(S): 9 INJECTION INTRAMUSCULAR; INTRAVENOUS; SUBCUTANEOUS at 13:18

## 2022-11-13 RX ADMIN — ATORVASTATIN CALCIUM 40 MILLIGRAM(S): 80 TABLET, FILM COATED ORAL at 21:26

## 2022-11-13 RX ADMIN — PANTOPRAZOLE SODIUM 40 MILLIGRAM(S): 20 TABLET, DELAYED RELEASE ORAL at 06:58

## 2022-11-13 RX ADMIN — Medication 2 MILLIGRAM(S): at 18:15

## 2022-11-13 RX ADMIN — Medication 2 MILLIGRAM(S): at 05:32

## 2022-11-13 RX ADMIN — Medication 4000 MILLILITER(S): at 23:01

## 2022-11-13 RX ADMIN — LEVETIRACETAM 500 MILLIGRAM(S): 250 TABLET, FILM COATED ORAL at 05:32

## 2022-11-13 RX ADMIN — SODIUM CHLORIDE 2 GRAM(S): 9 INJECTION INTRAMUSCULAR; INTRAVENOUS; SUBCUTANEOUS at 05:32

## 2022-11-13 RX ADMIN — POLYETHYLENE GLYCOL 3350 17 GRAM(S): 17 POWDER, FOR SOLUTION ORAL at 18:15

## 2022-11-13 RX ADMIN — Medication 650 MILLIGRAM(S): at 09:36

## 2022-11-13 RX ADMIN — POLYETHYLENE GLYCOL 3350 17 GRAM(S): 17 POWDER, FOR SOLUTION ORAL at 05:32

## 2022-11-13 RX ADMIN — Medication 50 MILLIGRAM(S): at 09:35

## 2022-11-13 RX ADMIN — Medication 81 MILLIGRAM(S): at 11:53

## 2022-11-13 RX ADMIN — SODIUM CHLORIDE 1 GRAM(S): 9 INJECTION INTRAMUSCULAR; INTRAVENOUS; SUBCUTANEOUS at 21:26

## 2022-11-13 RX ADMIN — LEVETIRACETAM 500 MILLIGRAM(S): 250 TABLET, FILM COATED ORAL at 18:15

## 2022-11-13 NOTE — PROGRESS NOTE ADULT - SUBJECTIVE AND OBJECTIVE BOX
GASTROENTEROLOGY PROGRESS NOTE  Patient seen and examined at bedside. No further bloody bm's. Son at bedside and confirms she had prior colonoscopy, is agreeable to endoscopic eval    PERTINENT REVIEW OF SYSTEMS: limited due to expressive aphasia    Allergies    No Known Drug Allergies  strawberry (Hives)    Intolerances      MEDICATIONS:  MEDICATIONS  (STANDING):  aspirin  chewable 81 milliGRAM(s) Oral daily  atorvastatin 40 milliGRAM(s) Oral at bedtime  benzocaine 15 mG/menthol 3.6 mG Lozenge 1 Lozenge Oral daily  dexAMETHasone     Tablet 2 milliGRAM(s) Oral every 12 hours  levETIRAcetam 500 milliGRAM(s) Oral two times a day  metoprolol tartrate 50 milliGRAM(s) Oral two times a day  pantoprazole    Tablet 40 milliGRAM(s) Oral before breakfast  polyethylene glycol 3350 17 Gram(s) Oral two times a day  polyethylene glycol/electrolyte Solution. 4000 milliLiter(s) Oral once  senna 2 Tablet(s) Oral at bedtime  sodium chloride 1 Gram(s) Oral every 8 hours  sodium chloride 0.9%. 1000 milliLiter(s) (75 mL/Hr) IV Continuous <Continuous>    MEDICATIONS  (PRN):  acetaminophen     Tablet .. 650 milliGRAM(s) Oral every 6 hours PRN Mild Pain (1 - 3)  bisacodyl 5 milliGRAM(s) Oral daily PRN Constipation  hydrALAZINE Injectable 10 milliGRAM(s) IV Push every 2 hours PRN SBP>160    Vital Signs Last 24 Hrs  T(C): 36.4 (13 Nov 2022 09:01), Max: 37.4 (13 Nov 2022 00:37)  T(F): 97.6 (13 Nov 2022 09:01), Max: 99.3 (13 Nov 2022 00:37)  HR: 63 (13 Nov 2022 11:56) (62 - 94)  BP: 121/77 (13 Nov 2022 11:56) (102/64 - 131/81)  BP(mean): 102 (13 Nov 2022 05:30) (70 - 102)  RR: 18 (13 Nov 2022 11:56) (18 - 20)  SpO2: 96% (13 Nov 2022 11:56) (95% - 97%)    Parameters below as of 13 Nov 2022 11:56  Patient On (Oxygen Delivery Method): room air        11-12 @ 07:01  -  11-13 @ 07:00  --------------------------------------------------------  IN: 200 mL / OUT: 0 mL / NET: 200 mL      PHYSICAL EXAM:    General: lying in bed, in no acute distress, expressive aphasia  HEENT: MMM, conjunctiva and sclera clear, crani scars  Gastrointestinal: Soft non-tender non-distended; No rebound or guarding  Skin: Warm and dry. No obvious rash    LABS:                        9.4    9.59  )-----------( 260      ( 13 Nov 2022 06:24 )             29.9     11-13    143  |  110<H>  |  21  ----------------------------<  95  3.9   |  26  |  0.66    Ca    8.8      13 Nov 2022 06:24  Phos  3.7     11-13  Mg     1.9     11-13      PT/INR - ( 13 Nov 2022 06:24 )   PT: 14.7 sec;   INR: 1.23          PTT - ( 13 Nov 2022 06:24 )  PTT:27.4 sec      Urinalysis Basic - ( 12 Nov 2022 19:37 )    Color: Yellow / Appearance: Clear / SG: >=1.030 / pH: x  Gluc: x / Ketone: NEGATIVE  / Bili: Negative / Urobili: 0.2 E.U./dL   Blood: x / Protein: NEGATIVE mg/dL / Nitrite: NEGATIVE   Leuk Esterase: NEGATIVE / RBC: x / WBC x   Sq Epi: x / Non Sq Epi: x / Bacteria: x                Urinalysis with Rflx Culture (collected 12 Nov 2022 19:37)      RADIOLOGY & ADDITIONAL STUDIES:  Reviewed

## 2022-11-13 NOTE — PROGRESS NOTE ADULT - SUBJECTIVE AND OBJECTIVE BOX
SUBJECTIVE: Patient states she is feeling alright. Denies abdominal pain, n/v/d, chest pain, shortness of breath.     HOSPITAL COURSE:  10/27: POD# 0 S/P bifrontal craniotomy for removal of skull base mass (frozen: meningioma.) Postop, Pt reports mild headache otherwise neurologically stable. Waxing/waning neuro exam. CTH stable, but with significant edema. Na 143, given 250cc 3% bolus.   10/28: POD1. CTH overnight stable. Oxycodone dc'd due to intermittent lethargy. He removed pending TOV. Failed TOV, straight cath prn.   10/29: POD2. Neuro stable. JPx2. Agitated overnight, precedex started transiently. SBP drop, precedex dc'd and 1L bolus given. Started on seroquel 12.5 at bedtime prn.  10/30: POD3, given additional 12.5 mg seroquel and placed on wrist restraints overnight for agitation. Wrist restraints removed. JUN # 2 removed. Metoprolol increased to 50 BID. Iron studies ordered for microcytic anemia.    10/31: POD4 BALJINDER overnight   11/1: POD5. patient agitated overnight attemtping to leave hospital, security at bedside. ordered 1:1 supervision. psych consulted, recc 10mg melatonin qd, seroquel 100mg BID and zyprexa up to 30mg/day. patient on restraints: b/l wrist/mittens, and soft vest./98 this morning hydral given, pending nutrition consult, upgraded to ICU for decreased level of arousal   11/2: POD 6 mening resection, ICU overnight, mental status improved but not fully back to post op baseline per son. CTH in AM stable, moved to SDU. Seroquel dose decreased per  reccomendations.   11/3: POD 7 meningioma resection. mental status improving.   11/4: POD 8 meningioma resection   11/5: POD 9 meningioma resection. Staples in place. BALJINDER overnight.   11/6: POD 10 resection. BALJINDER overnight.   11/7: POD 11 rsxn, BALJINDER overnight. Pt was in the bathroom brushing her teeth with her daughter with her when she suddenly felt light headed and became unresponsive, the nurse called a Rapid response and patient was brought back to the bed. Vitals were assessed at this time, FS was normal 118, pt was satting 95 on room air, BP was in the systolic 90's, 1 L normal saline was given. Pt returned to her neurologic baseline of AOx1 (self). EKG obtained, BMP/lactate/cardiac enzymes drawn. Pt remains in bed with 1 L bolus over a pressure bag. Dr. Garnica was notified and the hospitalist Dr. rCuz was at bedside. Lactate 3.5, given additional 1 L bolus. Lactate normalized to 1.2. Troponin normalized. Pt back to neurologic baseline.   11/8: POD12. BALJINDER overnight. No issues with vitals overnight, patient feels well this am. Staples removed from bicoronal crani incision, healing well.  Stroke code for change in mental status and new L facial, L weakness, workup wnl, also with new gaze preference, stroke following started on Asa 81, Decadron restarted, Briviat changed to Keppra and EEG monitoring   11/9: POD13, BALJINDER, on EEG overnight negative for seizures, 2% stopped and started on salt tabs, repeat sodium stable 140. EEG negative for seizures and d/c'ed.   11/10: POD14, BALJINDER overnight, off EEG (neg for seizure, showed some mod bifrontal slowing R>L). ASA 81mg QD. Neuro stable. MRI brain demonstrating new ischemic strokes, started on atorvastatin, echo with bubble ordered.   11/11: POD 14, BALJINDER o/n. Pending Echo. no issues overnight, intermittent headaches and more sleepy today however neuro status at baseline and moving all extremities non-focally, following commands briskly.   11/12: PD 15. BALJINDER o/n. + bright red blood in stool today. GI consulted, recommended holding SQL (ok to cont ASA), full liquid diet, trend CBCs. Fecal occult negative. BM since occurance with no BBB. UA done also negative for heme.   11/13: POD 16. BALJINDER o/n.     Vital Signs Last 24 Hrs  T(C): 37.4 (13 Nov 2022 00:37), Max: 37.4 (13 Nov 2022 00:37)  T(F): 99.3 (13 Nov 2022 00:37), Max: 99.3 (13 Nov 2022 00:37)  HR: 72 (13 Nov 2022 00:37) (66 - 94)  BP: 102/64 (13 Nov 2022 00:37) (102/64 - 122/68)  BP(mean): 70 (13 Nov 2022 00:37) (70 - 83)  RR: 20 (13 Nov 2022 00:37) (17 - 20)  SpO2: 96% (13 Nov 2022 00:37) (95% - 99%)    Parameters below as of 13 Nov 2022 00:37  Patient On (Oxygen Delivery Method): room air    I&O's Summary    11 Nov 2022 07:01  -  12 Nov 2022 07:00  --------------------------------------------------------  IN: 0 mL / OUT: 1100 mL / NET: -1100 mL    12 Nov 2022 07:01  -  13 Nov 2022 02:47  --------------------------------------------------------  IN: 200 mL / OUT: 0 mL / NET: 200 mL    PHYSICAL EXAM:  General: NAD, pt is comfortably laying in bed, A&O x 2-3 (self and place), on RA  HEENT: CN II-XII grossly intact, PERRL 3mm, EOMI b/l, face symmetric, tongue midline, neck FROM  Cardiovascular: RRR, normal S1 and S2   Respiratory: lungs CTAB, no wheezing, rhonchi, or crackles   GI: normoactive BS to auscultation, abd soft, NTND   Neuro: no aphasia, speech clear, no dysmetria, no pronator drift  strength 5/5 throughout all 4 extremities  sensation intact to light touch throughout   Extremities: distal pulses 2+ x 4   Wound/incision: bicoronal crani site C/D/I     LABS:                        9.8    11.37 )-----------( 246      ( 12 Nov 2022 16:43 )             30.9     11-12    142  |  109<H>  |  20  ----------------------------<  95  4.2   |  26  |  0.68    Ca    8.9      12 Nov 2022 08:41  Phos  3.2     11-12  Mg     2.0     11-12    Urinalysis Basic - ( 12 Nov 2022 19:37 )    Color: Yellow / Appearance: Clear / SG: >=1.030 / pH: x  Gluc: x / Ketone: NEGATIVE  / Bili: Negative / Urobili: 0.2 E.U./dL   Blood: x / Protein: NEGATIVE mg/dL / Nitrite: NEGATIVE   Leuk Esterase: NEGATIVE / RBC: x / WBC x   Sq Epi: x / Non Sq Epi: x / Bacteria: x    CAPILLARY BLOOD GLUCOSE    Drug Levels: [] N/A    CSF Analysis: [] N/A    Allergies    No Known Drug Allergies  strawberry (Hives)    Intolerances      MEDICATIONS:  Antibiotics:    Neuro:  acetaminophen     Tablet .. 650 milliGRAM(s) Oral every 6 hours PRN  levETIRAcetam 500 milliGRAM(s) Oral two times a day    Anticoagulation:  aspirin  chewable 81 milliGRAM(s) Oral daily    OTHER:  atorvastatin 40 milliGRAM(s) Oral at bedtime  benzocaine 15 mG/menthol 3.6 mG Lozenge 1 Lozenge Oral daily  bisacodyl 5 milliGRAM(s) Oral daily PRN  dexAMETHasone     Tablet 2 milliGRAM(s) Oral every 12 hours  hydrALAZINE Injectable 10 milliGRAM(s) IV Push every 2 hours PRN  metoprolol tartrate 50 milliGRAM(s) Oral two times a day  pantoprazole    Tablet 40 milliGRAM(s) Oral before breakfast  polyethylene glycol 3350 17 Gram(s) Oral two times a day  senna 2 Tablet(s) Oral at bedtime    IVF:  sodium chloride 2 Gram(s) Oral every 8 hours    CULTURES:    RADIOLOGY & ADDITIONAL TESTS:  < from: MR Head No Cont (11.10.22 @ 12:01) >  Findings:    Diffusion-weighted imaging shows recent infarct in the right anterior   thalamus and smaller infarcts in the centrum semiovale on both sides the   T2 flair images show similar degree of residual edema in the left frontal   lobe. Midline shift to the right appears slightly improving. There is   left tentorial subdural hemorrhage as partially seen on flair images.   There is extradural fluid T2 bifrontal craniotomy that measures up to 1   cm on the left. There is complex signal at the left anterior cranial   fossa, 2.1 cm dimension that protrudes into the residual left frontal   lobe. Persistence of vasogenic edema at this site is unclear. Diffusion   shows no purulent collection, however.    IMPRESSION:    Right thalamic recent infarct, new from 10/30/22 as are smaller cerebral   white matter infarcts.    Persistent edema at the left frontal lobe nearby complex postop change   with frontal craniectomy and flap, some of which appears convex to the   left frontal lobe but without diffusion restriction to suggest empyema.    < end of copied text >    ASSESSMENT:  76 y/o female with h/o HTN s/p bifrontal craniotomy for meningioma resection (10/27/22).     Plan:  Neuro:  - Neuro checks/vital signs q4  - Pain control PRN  - Bivirat 50 BID changed to keppra 500 BID 11/8  - Decadron 2q12  - CTH 11/8 (stroke code) stable but persistent edema, no new acute findings   - EEG 11/8, mod bifrontal slowing R>L, no seizures recorded, d/c'ed   - MRI brain 11/10 demonstrating thalamic ischemic strokes  - Stroke neuro following: Pend echo with bubble and possible loop recorder depending on findings     Cardiac:  - -140  - Holding home lisinopril d/t hypotension   - Increased home metoprolol to 50 mg BID 10/30  - Echo 11/10: mild LVH EF 75%   - Echo with bubble pending to r/o PFO per stroke  - Atorvastatin 40 mg daily for stroke core measures     Pulmonary:  - on RA, IS    GI:  - full liquid diet  - Bowel regimen  - PPI while on steroids  - GI consulted for bloody stool 11/12, plan for possible colonoscopy monday    Renal:  - Voiding intermittently, straight cath PRN   - Wean sodium tabs     Heme:  - SCDs, SQL held due to possible GI bleed  - LE dopplers 10/26: neg for DVT  - Microcytic anemia: f/u iron studies     Endo:  - A1C 5.1, no issues    ID:  - afebrile, trend leukocytosis  - augmentin x 10 days (10/31-11/10) per Roseline     Disposition:   - Tele, full code  - PT/OT recs AR, family updated with plan    Assessment and Plan d/w Dr. Garnica

## 2022-11-13 NOTE — PROGRESS NOTE ADULT - ASSESSMENT
78 yo F, HTN, prior hyperthyroid s/p thyroidectomy, presented for meningioma resection, course complicated by CVA on MRI brain  GI consulted for bloody stool    #Anemia, microcytic  #CVA on MRI  #Bloody Stool    DDx: hemorrhoidal, diverticulosis, angiectasias, colorectal neoplasm,, infectious colitis, ischemic bleeding    Recommendations:  Clear liquids today  Bowel Prep tonight  NPO tonight MN for colonoscopy monday  Please trend HgB  Please send Iron Studies  Please repeat COVID swab today    remainder of care per primary    Thank you for the courtesy of this consult. We will follow along with you.    Rodolfo Sierra M.D.  Gastroenterology Fellow  Weekday Pager: [phone]  Weeknights/Weekend Coverage: Please Call the  for contact info

## 2022-11-13 NOTE — PROGRESS NOTE ADULT - SUBJECTIVE AND OBJECTIVE BOX
Patient was seen and examined at bedside. Case discuss with resident. Pt reports that she has a headache this morning. Pt had an episode of blood in her stool yesterday.     OBJECTIVE:  Vital Signs Last 24 Hrs  T(C): 36.4 (13 Nov 2022 09:01), Max: 37.4 (13 Nov 2022 00:37)  T(F): 97.6 (13 Nov 2022 09:01), Max: 99.3 (13 Nov 2022 00:37)  HR: 73 (13 Nov 2022 09:01) (62 - 94)  BP: 116/74 (13 Nov 2022 09:01) (102/64 - 131/81)  BP(mean): 102 (13 Nov 2022 05:30) (70 - 102)  RR: 18 (13 Nov 2022 09:01) (18 - 20)  SpO2: 97% (13 Nov 2022 09:01) (95% - 97%)    Parameters below as of 13 Nov 2022 09:01  Patient On (Oxygen Delivery Method): room air    PHYSICAL EXAM:  Gen: NAD laying in bed; son at bedside   CV: RRR, +S1/S2, no mumur  Pulm: CTA b/l no wheezing or crackles   Abd: soft, NTND + BS no rebound or guarding       LABS:                        9.4    9.59  )-----------( 260      ( 13 Nov 2022 06:24 )             29.9     11-13    143  |  110<H>  |  21  ----------------------------<  95  3.9   |  26  |  0.66    Ca    8.8      13 Nov 2022 06:24  Phos  3.7     11-13  Mg     1.9     11-13    PT/INR - ( 13 Nov 2022 06:24 )   PT: 14.7 sec;   INR: 1.23     PTT - ( 13 Nov 2022 06:24 )  PTT:27.4 sec    Urinalysis Basic - ( 12 Nov 2022 19:37 )  Color: Yellow / Appearance: Clear / SG: >=1.030 / pH: x  Gluc: x / Ketone: NEGATIVE  / Bili: Negative / Urobili: 0.2 E.U./dL   Blood: x / Protein: NEGATIVE mg/dL / Nitrite: NEGATIVE   Leuk Esterase: NEGATIVE / RBC: x / WBC x   Sq Epi: x / Non Sq Epi: x / Bacteria: x      MEDICATIONS  (STANDING):  aspirin  chewable 81 milliGRAM(s) Oral daily  atorvastatin 40 milliGRAM(s) Oral at bedtime  benzocaine 15 mG/menthol 3.6 mG Lozenge 1 Lozenge Oral daily  dexAMETHasone     Tablet 2 milliGRAM(s) Oral every 12 hours  levETIRAcetam 500 milliGRAM(s) Oral two times a day  metoprolol tartrate 50 milliGRAM(s) Oral two times a day  pantoprazole    Tablet 40 milliGRAM(s) Oral before breakfast  polyethylene glycol 3350 17 Gram(s) Oral two times a day  polyethylene glycol/electrolyte Solution. 4000 milliLiter(s) Oral once  senna 2 Tablet(s) Oral at bedtime  sodium chloride 1 Gram(s) Oral every 8 hours  sodium chloride 0.9%. 1000 milliLiter(s) (75 mL/Hr) IV Continuous <Continuous>        A/P: 76 yo F hx of HTN, Hyperthyroidism who presented w/ likely L frontal meningioma and is s/p bifrontal craniotomy with meningioma resection 10/27.     # Meningioma s/p resection 10/27  - Steroids and seizure prophylaxis per primary team  - Management per primary team.    New thalamic infarct on MRI  - Continue atorvastatin  -Aspirin held given episode of blood in stool on 11/12  - Will f/u TTE with bubble  - Stroke team following    # Hypertension.   -Continue Metoprolol  -Will continue to holding lisinopril    #Agitation.   - Will continue to hold seroquel given episodes of nonresponsiveness.    #Blood in stool  - Pt was seen by GI and the pt is NPO @ MN on 11/13 for colonoscopy Monday  -Pt on clear liquid diet and pt to get bowel prep today  - Will continue to monitor H/H and maintain an active T&S  -Continue to hold ASA and DVT prop given episode of GI Bleed     #Dispo:  - I spoke to the pt's son Diaz who was at bedside and updated him on his mother medical condition.   - Acute rehab vs home with family assistance pending clinical improvement.

## 2022-11-13 NOTE — CHART NOTE - NSCHARTNOTEFT_GEN_A_CORE
POD 16. BALJINDER o/n. Decreased salt tabs to 1g every 8 hours. Plan for colonoscopy tomorrow. Bowel prep (Golytely) tonight at 10PM. NPO at midnight. Iron studies pending.

## 2022-11-14 ENCOUNTER — TRANSCRIPTION ENCOUNTER (OUTPATIENT)
Age: 77
End: 2022-11-14

## 2022-11-14 ENCOUNTER — FORM ENCOUNTER (OUTPATIENT)
Age: 77
End: 2022-11-14

## 2022-11-14 DIAGNOSIS — K62.5 HEMORRHAGE OF ANUS AND RECTUM: ICD-10-CM

## 2022-11-14 LAB
ANION GAP SERPL CALC-SCNC: 8 MMOL/L — SIGNIFICANT CHANGE UP (ref 5–17)
APTT BLD: 26.7 SEC — LOW (ref 27.5–35.5)
BUN SERPL-MCNC: 11 MG/DL — SIGNIFICANT CHANGE UP (ref 7–23)
CALCIUM SERPL-MCNC: 8.6 MG/DL — SIGNIFICANT CHANGE UP (ref 8.4–10.5)
CHLORIDE SERPL-SCNC: 110 MMOL/L — HIGH (ref 96–108)
CO2 SERPL-SCNC: 26 MMOL/L — SIGNIFICANT CHANGE UP (ref 22–31)
CREAT SERPL-MCNC: 0.64 MG/DL — SIGNIFICANT CHANGE UP (ref 0.5–1.3)
EGFR: 92 ML/MIN/1.73M2 — SIGNIFICANT CHANGE UP
FERRITIN SERPL-MCNC: 48 NG/ML — SIGNIFICANT CHANGE UP (ref 15–150)
GLUCOSE SERPL-MCNC: 85 MG/DL — SIGNIFICANT CHANGE UP (ref 70–99)
HCT VFR BLD CALC: 31.4 % — LOW (ref 34.5–45)
HGB BLD-MCNC: 9.9 G/DL — LOW (ref 11.5–15.5)
INR BLD: 1.31 — HIGH (ref 0.88–1.16)
IRON SATN MFR SERPL: 18 % — SIGNIFICANT CHANGE UP (ref 14–50)
IRON SATN MFR SERPL: 42 UG/DL — SIGNIFICANT CHANGE UP (ref 30–160)
MAGNESIUM SERPL-MCNC: 1.7 MG/DL — SIGNIFICANT CHANGE UP (ref 1.6–2.6)
MCHC RBC-ENTMCNC: 22.9 PG — LOW (ref 27–34)
MCHC RBC-ENTMCNC: 31.5 GM/DL — LOW (ref 32–36)
MCV RBC AUTO: 72.7 FL — LOW (ref 80–100)
NRBC # BLD: 0 /100 WBCS — SIGNIFICANT CHANGE UP (ref 0–0)
PHOSPHATE SERPL-MCNC: 3.4 MG/DL — SIGNIFICANT CHANGE UP (ref 2.5–4.5)
PLATELET # BLD AUTO: 199 K/UL — SIGNIFICANT CHANGE UP (ref 150–400)
POTASSIUM SERPL-MCNC: 3.6 MMOL/L — SIGNIFICANT CHANGE UP (ref 3.5–5.3)
POTASSIUM SERPL-SCNC: 3.6 MMOL/L — SIGNIFICANT CHANGE UP (ref 3.5–5.3)
PROTHROM AB SERPL-ACNC: 15.6 SEC — HIGH (ref 10.5–13.4)
RBC # BLD: 4.32 M/UL — SIGNIFICANT CHANGE UP (ref 3.8–5.2)
RBC # FLD: 17.2 % — HIGH (ref 10.3–14.5)
SARS-COV-2 RNA SPEC QL NAA+PROBE: SIGNIFICANT CHANGE UP
SODIUM SERPL-SCNC: 144 MMOL/L — SIGNIFICANT CHANGE UP (ref 135–145)
TIBC SERPL-MCNC: 236 UG/DL — SIGNIFICANT CHANGE UP (ref 220–430)
UIBC SERPL-MCNC: 194 UG/DL — SIGNIFICANT CHANGE UP (ref 110–370)
WBC # BLD: 8.3 K/UL — SIGNIFICANT CHANGE UP (ref 3.8–10.5)
WBC # FLD AUTO: 8.3 K/UL — SIGNIFICANT CHANGE UP (ref 3.8–10.5)

## 2022-11-14 PROCEDURE — 99233 SBSQ HOSP IP/OBS HIGH 50: CPT | Mod: 25

## 2022-11-14 PROCEDURE — 93308 TTE F-UP OR LMTD: CPT | Mod: 26

## 2022-11-14 PROCEDURE — 99232 SBSQ HOSP IP/OBS MODERATE 35: CPT | Mod: GC

## 2022-11-14 PROCEDURE — 99024 POSTOP FOLLOW-UP VISIT: CPT

## 2022-11-14 RX ORDER — POTASSIUM CHLORIDE 20 MEQ
40 PACKET (EA) ORAL ONCE
Refills: 0 | Status: COMPLETED | OUTPATIENT
Start: 2022-11-14 | End: 2022-11-14

## 2022-11-14 RX ORDER — MAGNESIUM OXIDE 400 MG ORAL TABLET 241.3 MG
400 TABLET ORAL ONCE
Refills: 0 | Status: COMPLETED | OUTPATIENT
Start: 2022-11-14 | End: 2022-11-14

## 2022-11-14 RX ORDER — SOD SULF/SODIUM/NAHCO3/KCL/PEG
4000 SOLUTION, RECONSTITUTED, ORAL ORAL ONCE
Refills: 0 | Status: DISCONTINUED | OUTPATIENT
Start: 2022-11-14 | End: 2022-11-14

## 2022-11-14 RX ORDER — SOD SULF/SODIUM/NAHCO3/KCL/PEG
4000 SOLUTION, RECONSTITUTED, ORAL ORAL
Refills: 0 | Status: DISCONTINUED | OUTPATIENT
Start: 2022-11-14 | End: 2022-11-14

## 2022-11-14 RX ORDER — POLYETHYLENE GLYCOL 3350 17 G/17G
17 POWDER, FOR SOLUTION ORAL ONCE
Refills: 0 | Status: COMPLETED | OUTPATIENT
Start: 2022-11-14 | End: 2022-11-14

## 2022-11-14 RX ADMIN — SENNA PLUS 2 TABLET(S): 8.6 TABLET ORAL at 22:17

## 2022-11-14 RX ADMIN — PANTOPRAZOLE SODIUM 40 MILLIGRAM(S): 20 TABLET, DELAYED RELEASE ORAL at 06:36

## 2022-11-14 RX ADMIN — SODIUM CHLORIDE 1 GRAM(S): 9 INJECTION INTRAMUSCULAR; INTRAVENOUS; SUBCUTANEOUS at 06:37

## 2022-11-14 RX ADMIN — Medication 81 MILLIGRAM(S): at 13:29

## 2022-11-14 RX ADMIN — ATORVASTATIN CALCIUM 40 MILLIGRAM(S): 80 TABLET, FILM COATED ORAL at 22:16

## 2022-11-14 RX ADMIN — SODIUM CHLORIDE 1 GRAM(S): 9 INJECTION INTRAMUSCULAR; INTRAVENOUS; SUBCUTANEOUS at 13:31

## 2022-11-14 RX ADMIN — POLYETHYLENE GLYCOL 3350 17 GRAM(S): 17 POWDER, FOR SOLUTION ORAL at 19:36

## 2022-11-14 RX ADMIN — Medication 2 MILLIGRAM(S): at 19:35

## 2022-11-14 RX ADMIN — LEVETIRACETAM 500 MILLIGRAM(S): 250 TABLET, FILM COATED ORAL at 06:37

## 2022-11-14 RX ADMIN — MAGNESIUM OXIDE 400 MG ORAL TABLET 400 MILLIGRAM(S): 241.3 TABLET ORAL at 07:56

## 2022-11-14 RX ADMIN — SODIUM CHLORIDE 1 GRAM(S): 9 INJECTION INTRAMUSCULAR; INTRAVENOUS; SUBCUTANEOUS at 22:17

## 2022-11-14 RX ADMIN — Medication 40 MILLIEQUIVALENT(S): at 07:56

## 2022-11-14 RX ADMIN — LEVETIRACETAM 500 MILLIGRAM(S): 250 TABLET, FILM COATED ORAL at 19:35

## 2022-11-14 RX ADMIN — BENZOCAINE AND MENTHOL 1 LOZENGE: 5; 1 LIQUID ORAL at 13:32

## 2022-11-14 RX ADMIN — Medication 50 MILLIGRAM(S): at 11:45

## 2022-11-14 RX ADMIN — Medication 50 MILLIGRAM(S): at 22:17

## 2022-11-14 RX ADMIN — Medication 2 MILLIGRAM(S): at 06:37

## 2022-11-14 NOTE — DISCHARGE NOTE PROVIDER - NSDCMRMEDTOKEN_GEN_ALL_CORE_FT
aspirin 81 mg oral tablet, chewable: 0.5 tab(s) orally once a day, As Needed  (last taken over 1 week ago)  lisinopril 10 mg oral tablet: 1 tab(s) orally once a day  metoprolol succinate 25 mg oral tablet, extended release: 1 tab(s) orally 2 times a day   acetaminophen 325 mg oral tablet: 2 tab(s) orally every 6 hours, As needed, Mild Pain (1 - 3)  aspirin 81 mg oral tablet, chewable: 1 tab(s) orally once a day  atorvastatin 40 mg oral tablet: 1 tab(s) orally once a day (at bedtime)  enoxaparin: 40 milligram(s) subcutaneous once a day (at bedtime)  metoprolol succinate 25 mg oral tablet, extended release: 1 tab(s) orally 2 times a day  senna leaf extract oral tablet: 2 tab(s) orally once a day (at bedtime)   acetaminophen 325 mg oral tablet: 2 tab(s) orally every 6 hours, As needed, Mild Pain (1 - 3)  aspirin 81 mg oral tablet, chewable: 1 tab(s) orally once a day  atorvastatin 40 mg oral tablet: 1 tab(s) orally once a day (at bedtime)  enoxaparin: 40 milligram(s) subcutaneous once a day (at bedtime)  hydrocortisone 25 mg rectal suppository: 1 suppository(ies) rectal once a day  lidocaine 4% topical film: Apply topically to affected area once a day, As Needed for R knee pain  metoprolol succinate 100 mg oral tablet, extended release: 1 tab(s) orally once a day  senna leaf extract oral tablet: 2 tab(s) orally once a day (at bedtime)

## 2022-11-14 NOTE — DISCHARGE NOTE PROVIDER - PROVIDER TOKENS
PROVIDER:[TOKEN:[[id number]]] PROVIDER:[TOKEN:[[id number]]],PROVIDER:[TOKEN:[[id number]]],PROVIDER:[TOKEN:[[id number]]]

## 2022-11-14 NOTE — PROGRESS NOTE ADULT - SUBJECTIVE AND OBJECTIVE BOX
SUBJECTIVE:  Patient seen and examined at bedside.  Only was able to complete half of GoLytely last evening.  Reports feeling well this AM, no new complaints.  Remains tangential    ROS:  Denies fevers, chills, headache, vision changes, neck pain, cough, SOB, chest pain, Abdominal pain, N/V, dysuria or new rash.  All other ROS negative except as above    Vital Signs Last 12 Hrs  T(F): 98.9 (11-14-22 @ 14:32), Max: 98.9 (11-14-22 @ 14:32)  HR: 73 (11-14-22 @ 14:32) (68 - 73)  BP: 151/78 (11-14-22 @ 14:32) (129/82 - 151/78)  BP(mean): --  RR: 19 (11-14-22 @ 14:32) (18 - 19)  SpO2: 99% (11-14-22 @ 14:32) (97% - 99%)  I&O's Summary    13 Nov 2022 07:01  -  14 Nov 2022 07:00  --------------------------------------------------------  IN: 1620 mL / OUT: 1750 mL / NET: -130 mL        PHYSICAL EXAM:  Constitutional: NAD, comfortable in bed.  HEENT: PERRLA, EOMI, MMM, crani incision c/d/i  Neck: Supple  Respiratory: CTA B/L. No w/r/r.   Cardiovascular: RRR, normal S1 and S2, no m/r/g.   Gastrointestinal: +BS, soft NTND, no guarding or rebound tenderness, no palpable masses   Extremities: wwp; no cyanosis, clubbing or edema.   Vascular: Pulses equal and strong throughout.   Neurological: AAOx1 to self, tangential, strength and sensation intact throughout        LABS:                        9.9    8.30  )-----------( 199      ( 14 Nov 2022 06:31 )             31.4     11-14    144  |  110<H>  |  11  ----------------------------<  85  3.6   |  26  |  0.64    Ca    8.6      14 Nov 2022 06:31  Phos  3.4     11-14  Mg     1.7     11-14      PT/INR - ( 14 Nov 2022 06:31 )   PT: 15.6 sec;   INR: 1.31          PTT - ( 14 Nov 2022 06:31 )  PTT:26.7 sec  Urinalysis Basic - ( 12 Nov 2022 19:37 )    Color: Yellow / Appearance: Clear / SG: >=1.030 / pH: x  Gluc: x / Ketone: NEGATIVE  / Bili: Negative / Urobili: 0.2 E.U./dL   Blood: x / Protein: NEGATIVE mg/dL / Nitrite: NEGATIVE   Leuk Esterase: NEGATIVE / RBC: x / WBC x   Sq Epi: x / Non Sq Epi: x / Bacteria: x          RADIOLOGY & ADDITIONAL TESTS:  No new imaging    MEDICATIONS  (STANDING):  aspirin  chewable 81 milliGRAM(s) Oral daily  atorvastatin 40 milliGRAM(s) Oral at bedtime  benzocaine 15 mG/menthol 3.6 mG Lozenge 1 Lozenge Oral daily  dexAMETHasone     Tablet 2 milliGRAM(s) Oral every 12 hours  levETIRAcetam 500 milliGRAM(s) Oral two times a day  metoprolol tartrate 50 milliGRAM(s) Oral two times a day  pantoprazole    Tablet 40 milliGRAM(s) Oral before breakfast  polyethylene glycol/electrolyte Solution. 4000 milliLiter(s) Oral once  senna 2 Tablet(s) Oral at bedtime  sodium chloride 1 Gram(s) Oral every 8 hours  sodium chloride 0.9%. 1000 milliLiter(s) (75 mL/Hr) IV Continuous <Continuous>    MEDICATIONS  (PRN):  acetaminophen     Tablet .. 650 milliGRAM(s) Oral every 6 hours PRN Mild Pain (1 - 3)  bisacodyl 5 milliGRAM(s) Oral daily PRN Constipation

## 2022-11-14 NOTE — DISCHARGE NOTE PROVIDER - NSDCFUADDINST_GEN_ALL_CORE_FT
Neurosurgery follow up appointment date/time:  - Follow up with Dr. Garnica in the office for a wound check   - please call the office to confirm appointment:     Wound Care:  - you may shower and wash your hair  - leave incision uncovered, open to air     Devices/Drains/Lines:  - sebastian in place? Management/Urology follow up?    Activity:  - fatigue is common after surgery, rest if you feel tired   - no bending, lifting, twisting or heavy lifting   - walking is recommended, ambulate as tolerated  - you may shower when you get home, keep your incision dry  - no bathing   - no driving within 24 hours of anesthesia or while taking prescription pain medications   - keep hydrated, drink plenty of water     Inpatient consults:  - GI  - Neurology     Please also follow up with your primary care doctor.     Pain Expectations:  - pain after surgery is expected  - please take pain meds as prescribed     Medications:  - changes to home meds (ex. AED's)?  - new meds?  - pain meds?  - when can antiplatelets or anticoagulants be restarted?  - were adverse affects of meds discussed with patients?   - pain medications can cause constipation, you should eat a high fiber diet and may take a stool softener while on pain meds   - Avoid taking Advil (ibuprofen), Motrin (naproxen), or Aspirin for pain as they can cause bleeding     Call the office or come to ED if:  - wound has drainage or bleeding, increased redness or pain at incision site, neurological change, fever (>101), chills, night sweats, syncope, nausea/vomiting      Playback:  - are discharge instruction on playback?  - is a picture of the incision on playback?     WITHIN 24 HOURS OF DISCHARGE, PLEASE CONTACT NEURO PA  WITH ANY QUESTIONS OR CONCERNS: [phone]   OTHERWISE, PLEASE CALL THE OFFICE WITH ANY QUESTIONS OR CONCERNS: [phone] Neurosurgery follow up:   - Follow up with Dr. Garnica in the office for a wound check   - please call the office to make/confirm appointment: [phone]    Wound Care:  - you may shower and wash your hair  - leave incision uncovered, open to air     Devices:  - Loop recorder - please follow up with EP     Activity:  - fatigue is common after surgery, rest if you feel tired   - no bending, lifting, twisting or heavy lifting   - walking is recommended, ambulate as tolerated  - you may shower when you get home, keep your incision dry  - no bathing   - no driving within 24 hours of anesthesia or while taking prescription pain medications   - keep hydrated, drink plenty of water     Inpatient consults:  - GI - follow up with GI outpatient if needed for hemorrhoids   - Neurology - follow up outpatient with Dr. Mcdaniel from stroke neurology   - EP - follow up outpatient     Please also follow up with your primary care doctor.     Pain Expectations:  - pain after surgery is expected  - please take pain meds as prescribed     Medications:  - Home meds  - new meds?  - pain meds: Tylenol as needed  - pain medications can cause constipation, you should eat a high fiber diet and may take a stool softener while on pain meds   - Avoid taking Advil (ibuprofen), Motrin (naproxen), or Aspirin for pain as they can cause bleeding     Call the office or come to ED if:  - wound has drainage or bleeding, increased redness or pain at incision site, neurological change, fever (>101), chills, night sweats, syncope, nausea/vomiting      Playback:  - see [employer] health for a copy of your discharge paperwork     WITHIN 24 HOURS OF DISCHARGE, PLEASE CONTACT NEURO PA  WITH ANY QUESTIONS OR CONCERNS: [phone]   OTHERWISE, PLEASE CALL THE OFFICE WITH ANY QUESTIONS OR CONCERNS: [phone] Neurosurgery follow up:   - Follow up with Dr. Garnica in the office for a wound check   - please call the office to make/confirm appointment: [phone]    Wound Care:  - you may shower and wash your hair  - leave incision uncovered, open to air     Devices:  - Loop recorder - please follow up with EP in 4-6 weeks    Activity:  - fatigue is common after surgery, rest if you feel tired   - no bending, lifting, twisting or heavy lifting   - walking is recommended, ambulate as tolerated  - you may shower when you get home, keep your incision dry  - no bathing   - no driving within 24 hours of anesthesia or while taking prescription pain medications   - keep hydrated, drink plenty of water     Inpatient consults:  - GI - follow up with GI outpatient if needed for hemorrhoids   - Neurology - follow up outpatient with Dr. Mcdaniel from stroke neurology   - EP - follow up outpatient in 4-6 weeks    Please also follow up with your primary care doctor.     Pain Expectations:  - pain after surgery is expected  - please take pain meds as prescribed     Medications:  - Continue aspirin 81 mg daily.   - Continue lovenox 40mg SQ daily at 10pm for DVT prophylaxis.  - Continue lidocaine 4% patch to Right knee daily for knee pain.   - Continue Senna 2 tablets daily and dulcolax 5mg prn for constipation.   - Take tylenol 650mg as needed for pain. Do not consume more than 4000g of tylenol per day.  - Use hydrocortisone hemorrhoidal suppository rectally daily for internal hemorrhoids.  - Continue lipitor 40mg daily and metoprolol succinate ER 100mg daily. Metoprolol was increased from your home dose during this admission.  - pain medications can cause constipation, you should eat a high fiber diet and may take a stool softener while on pain meds   - Avoid taking Advil (ibuprofen), Motrin (naproxen), or Aspirin for pain as they can cause bleeding     Call the office or come to ED if:  - wound has drainage or bleeding, increased redness or pain at incision site, neurological change, fever (>101), chills, night sweats, syncope, nausea/vomiting      Playback:  - see [employer] [facility] for a copy of your discharge paperwork     WITHIN 24 HOURS OF DISCHARGE, PLEASE CONTACT NEURO PA  WITH ANY QUESTIONS OR CONCERNS: [phone]   OTHERWISE, PLEASE CALL THE OFFICE WITH ANY QUESTIONS OR CONCERNS: [phone] Neurosurgery follow up:   - Follow up with Dr. Garnica in the office for a wound check   - please call the office to make/confirm appointment: [phone]    Wound Care:  - you may shower and wash your hair  - leave incision uncovered, open to air     Devices:  - Loop recorder - please follow up with EP in 4-6 weeks by calling [phone]    Activity:  - fatigue is common after surgery, rest if you feel tired   - no bending, lifting, twisting or heavy lifting   - walking is recommended, ambulate as tolerated  - you may shower when you get home, keep your incision dry  - no bathing   - no driving within 24 hours of anesthesia or while taking prescription pain medications   - keep hydrated, drink plenty of water     Inpatient consults:  - GI - follow up with GI outpatient if needed for hemorrhoids   - Neurology - follow up outpatient with Dr. Mcdaniel from stroke neurology   - EP - follow up outpatient in 4-6 weeks for management of loop recorder     Please also follow up with your primary care doctor.     Pain Expectations:  - pain after surgery is expected  - please take pain meds as prescribed     Medications:  - home meds: home Metoprolol was increased to 100mg daily. HOLD home Lisinopril, may resume for sustained SBP >140 and discuss with PCP   - Continue aspirin 81 mg daily for CVA  - Continue lovenox 40mg SQ daily at 10pm for DVT prophylaxis.  - Continue lidocaine 4% patch to Right knee as needed for knee pain.   - Continue Senna 2 tablets daily and dulcolax 5mg prn for constipation.   - Take tylenol 650mg as needed for pain. Do not consume more than 4000g of tylenol per day.  - Use hydrocortisone hemorrhoidal suppository rectally daily for internal hemorrhoids.  - Continue lipitor 40mg daily and metoprolol succinate ER 100mg daily. Metoprolol was increased from your home dose during this admission.  - pain medications can cause constipation, you should eat a high fiber diet and may take a stool softener while on pain meds   - Avoid taking Advil (ibuprofen), Motrin (naproxen), or Aspirin for pain as they can cause bleeding     Call the office or come to ED if:  - wound has drainage or bleeding, increased redness or pain at incision site, neurological change, fever (>101), chills, night sweats, syncope, nausea/vomiting      Playback:  - see [employer] [facility] for a copy of your discharge paperwork     WITHIN 24 HOURS OF DISCHARGE, PLEASE CONTACT NEURO PA  WITH ANY QUESTIONS OR CONCERNS: [phone]   OTHERWISE, PLEASE CALL THE OFFICE WITH ANY QUESTIONS OR CONCERNS: [phone]

## 2022-11-14 NOTE — DISCHARGE NOTE PROVIDER - NSDCFUSCHEDAPPT_GEN_ALL_CORE_FT
[facility]  [facility] [address]  Scheduled Appointment: 12/15/2022     Monica Escobar  [facility]  NEUROSURG [address]  Scheduled Appointment: 12/02/2022    [facility]  [facility]ART[facility] [address]  Scheduled Appointment: 12/15/2022

## 2022-11-14 NOTE — DISCHARGE NOTE PROVIDER - NSDCCPCAREPLAN_GEN_ALL_CORE_FT
PRINCIPAL DISCHARGE DIAGNOSIS  Diagnosis: Meningioma  Assessment and Plan of Treatment:       SECONDARY DISCHARGE DIAGNOSES  Diagnosis: Agitation  Assessment and Plan of Treatment:     Diagnosis: Anemia due to acute blood loss  Assessment and Plan of Treatment:     Diagnosis: Leukocytosis  Assessment and Plan of Treatment:     Diagnosis: CVA (cerebrovascular accident)  Assessment and Plan of Treatment:     Diagnosis: Hypertension  Assessment and Plan of Treatment:      PRINCIPAL DISCHARGE DIAGNOSIS  Diagnosis: Meningioma  Assessment and Plan of Treatment:       SECONDARY DISCHARGE DIAGNOSES  Diagnosis: Agitation  Assessment and Plan of Treatment:     Diagnosis: Anemia due to acute blood loss  Assessment and Plan of Treatment:     Diagnosis: Leukocytosis  Assessment and Plan of Treatment:     Diagnosis: Bright red blood per rectum  Assessment and Plan of Treatment: s/p colonscopy, 2/2 hemorrhoid    Diagnosis: CVA (cerebrovascular accident)  Assessment and Plan of Treatment: likely 2/2 PFO found on echo with bubble study, s/p loop recorder    Diagnosis: Hypertension  Assessment and Plan of Treatment:

## 2022-11-14 NOTE — DISCHARGE NOTE PROVIDER - CARE PROVIDER_API CALL
Nabil Garnica)  Neurosurgery  [address], [place] [address]  Phone: [phone]  Fax: [phone]  Follow Up Time:    Nabil Garnica)  Neurosurgery  [address] [place] [address]  Phone: [phone]  Fax: [phone]  Follow Up Time:     Nery Mcdaniel)  Neurology  [address] [place] [address]  Phone: [phone]  Fax: [phone]  Follow Up Time:     Vasile Torres  GASTROENTEROLOGY  [address] [place] [address]  Phone: [phone]  Fax: [phone]  Follow Up Time:

## 2022-11-14 NOTE — PROGRESS NOTE ADULT - ASSESSMENT
76 yo F, HTN, prior hyperthyroid s/p thyroidectomy, presented for meningioma resection, course complicated by CVA on MRI brain    GI consulted for bloody stool though no further episodes since prep, though unable to complete full prep    #Anemia, Iron deficient  #CVA on MRI  #Bloody Stool    Hgb stable.  DDx: hemorrhoidal, diverticulosis, angiectasias, colorectal neoplasm,, infectious colitis, ischemic bleeding    Recommendations:  -Clear liquids today  -Continue bowel prep; please administer additional bottle of miralax this PM  -Plan for prep tomorrow 11/15  -NPO tonight MN for colonoscopy Tuesday  -Please trend HgB  -Consider IV iron while inpt  -Please repeat COVID swab today    remainder of care per primary    Catrachito Foster DO  Gastroenterology Fellow  Pager: [phone]

## 2022-11-14 NOTE — DISCHARGE NOTE PROVIDER - HOSPITAL COURSE
HPI:    Hospital Course:    Patient evaluated by PT/OT who recommended:  Patient is going home? rehab? hospice? Facility Name:     Hospital course c/b:     Exam on day of discharge:    Checklist:   - Obtained follow up appointment from NP  - Reviewed final recommendations of inpatient consults  - review discharge planning on provider handoff  - post op imaging completed  - Neurologically stable for discharge  - Vitals stable for discharge   - Afebrile for discharge  - WBC is stable  - Sodium level is normal  - Pain is adequately controlled  - Pt has PICC/walker/brace/collar   - LACE score (10 or > needs PCP apt)      HPI:  77 year old female with pmh HTN who lives in [place] presents with left frontal mass, likely meningioma. Per son, meningioma was diagnosed upon CT head performed for memory loss and confusion. Son reports that confusion has been ongoing for several months. Patient admits to mild headache but denies diplopia, blurry vision, nausea, vomiting, extremity numbness or weakness and slurred speech. Patient is preop for bifrontal craniotomy for meningioma resection 10/27.    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 Waxing/waning neuro exam. CTH stable, but with significant edema. Na 143, given 250cc 3% bolus.   10/28: POD1. CTH overnight stable. Oxycodone dc'd due to intermittent lethargy. He removed pending TOV. Failed TOV, straight cath prn.   10/29: POD2. Neuro stable. JPx2. Agitated overnight, precedex started transiently. SBP drop, precedex dc'd and 1L bolus given. Started on seroquel 12.5 at bedtime prn.  10/30: POD3, given additional 12.5 mg seroquel and placed on wrist restraints overnight for agitation. Wrist restraints removed. JUN # 2 removed. Metoprolol increased to 50 BID. Iron studies ordered for microcytic anemia.    10/31: POD4 BALJINDER overnight   11/1: POD5. patient agitated overnight attemtping to leave hospital, security at bedside. ordered 1:1 supervision. psych consulted, recc 10mg melatonin qd, seroquel 100mg BID and zyprexa up to 30mg/day. patient on restraints: b/l wrist/mittens, and soft vest./98 this morning hydral given, pending nutrition consult, upgraded to ICU for decreased level of arousal   11/2: POD 6 mening resection, ICU overnight, mental status improved but not fully back to post op baseline per son. CTH in AM stable, moved to SDU. Seroquel dose decreased per  reccomendations.   11/3: POD 7 meningioma resection. mental status improving.   11/4: POD 8 meningioma resection   11/5: POD 9 meningioma resection. Staples in place. BALJINDER overnight.   11/6: POD 10 resection. BALJINDER overnight.   11/7: POD 11 rsxn, BALJINDER overnight. Pt was in the bathroom brushing her teeth with her daughter with her when she suddenly felt light headed and became unresponsive, the nurse called a Rapid response and patient was brought back to the bed. Vitals were assessed at this time, FS was normal 118, pt was satting 95 on room air, BP was in the systolic 90's, 1 L normal saline was given. Pt returned to her neurologic baseline of AOx1 (self). EKG obtained, BMP/lactate/cardiac enzymes drawn. Pt remains in bed with 1 L bolus over a pressure bag. Dr. Garnica was notified and the hospitalist Dr. Cruz was at bedside. Lactate 3.5, given additional 1 L bolus. Lactate normalized to 1.2. Troponin normalized. Pt back to neurologic baseline.   11/8: POD12. BALJINDER overnight. No issues with vitals overnight, patient feels well this am. Staples removed from bicoronal crani incision, healing well.  Stroke code for change in mental status and new L facial, L weakness, workup wnl, also with new gaze preference, stroke following started on Asa 81, Decadron restarted, Briviat changed to Keppra and EEG monitoring   11/9: POD13, BALJINDER, on EEG overnight negative for seizures, 2% stopped and started on salt tabs, repeat sodium stable 140. EEG negative for seizures and d/c'ed.   11/10: POD14, BALJINDER overnight, off EEG (neg for seizure, showed some mod bifrontal slowing R>L). ASA 81mg QD. Neuro stable. MRI brain demonstrating new ischemic strokes, started on atorvastatin, echo with bubble ordered.   11/11: POD 14, BALJINDER o/n. Pending Echo. no issues overnight, intermittent headaches and more sleepy today however neuro status at baseline and moving all extremities non-focally, following commands briskly.   11/12: PD 15. BALJINDER o/n. + bright red blood in stool today. GI consulted, recommended holding SQL (ok to cont ASA), full liquid diet, trend CBCs. Fecal occult negative. BM since occurance with no BBB. UA done also negative for heme.   11/13: POD 16. BALJINDER o/n. Decreased salt tabs to 1q8. Plan for colonoscopy tomorrow. Iron studies pending.   11/14: POD 17. Unable to finish bowel prep overnight, will plan for colonoscopy tomorrow. Echo w/ bubble showed very small PFO.   11/15: POD 18, Pending colonoscopy. EP consult for loop recorder. Plan for discharge today after colonoscopy and loop recorder placed     Patient evaluated by PT/OT who recommended: AR  Patient is going to [facility] course c/b: CVA (new thalamic infarct on MRI, PFO found on bubble study, treating with Aspirin and discharging with loop recorder), bright red blood per rectum (resolved, s/p colonoscopy with GI showing hermorrhoids), agitation (resolved)     Exam on day of discharge:  General: NAD, pt is comfortably laying in bed, A&O x 2-3 (self and place), on RA  HEENT: CN II-XII grossly intact, PERRL 3mm, EOMI b/l, face symmetric, tongue midline, neck FROM  Cardiovascular: RRR, normal S1 and S2   Respiratory: lungs CTAB, no wheezing, rhonchi, or crackles   GI: normoactive BS to auscultation, abd soft, NTND   Neuro: no aphasia, speech clear, no dysmetria, no pronator drift  strength 5/5 throughout all 4 extremities  sensation intact to light touch throughout   Extremities: distal pulses 2+ x 4   Wound/incision: bicoronal crani site C/D/I     Patient is neuro stable, vitals stable, afebrile,     Checklist:   - Obtained follow up appointment from NP  - Reviewed final recommendations of inpatient consults     HPI:  77 year old female with pmh HTN who lives in [place] presents with left frontal mass, likely meningioma. Per son, meningioma was diagnosed upon CT head performed for memory loss and confusion. Son reports that confusion has been ongoing for several months. Patient admits to mild headache but denies diplopia, blurry vision, nausea, vomiting, extremity numbness or weakness and slurred speech. Patient is preop for bifrontal craniotomy for meningioma resection 10/27.    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 Waxing/waning neuro exam. CTH stable, but with significant edema. Na 143, given 250cc 3% bolus.   10/28: POD1. CTH overnight stable. Oxycodone dc'd due to intermittent lethargy. He removed pending TOV. Failed TOV, straight cath prn.   10/29: POD2. Neuro stable. JPx2. Agitated overnight, precedex started transiently. SBP drop, precedex dc'd and 1L bolus given. Started on seroquel 12.5 at bedtime prn.  10/30: POD3, given additional 12.5 mg seroquel and placed on wrist restraints overnight for agitation. Wrist restraints removed. JUN # 2 removed. Metoprolol increased to 50 BID. Iron studies ordered for microcytic anemia.    10/31: POD4 BALJINDER overnight   11/1: POD5. patient agitated overnight attemtping to leave hospital, security at bedside. ordered 1:1 supervision. psych consulted, recc 10mg melatonin qd, seroquel 100mg BID and zyprexa up to 30mg/day. patient on restraints: b/l wrist/mittens, and soft vest./98 this morning hydral given, pending nutrition consult, upgraded to ICU for decreased level of arousal   11/2: POD 6 mening resection, ICU overnight, mental status improved but not fully back to post op baseline per son. CTH in AM stable, moved to SDU. Seroquel dose decreased per  reccomendations.   11/3: POD 7 meningioma resection. mental status improving.   11/4: POD 8 meningioma resection   11/5: POD 9 meningioma resection. Staples in place. BALJINDER overnight.   11/6: POD 10 resection. BALJINDER overnight.   11/7: POD 11 rsxn, BALJINDER overnight. Pt was in the bathroom brushing her teeth with her daughter with her when she suddenly felt light headed and became unresponsive, the nurse called a Rapid response and patient was brought back to the bed. Vitals were assessed at this time, FS was normal 118, pt was satting 95 on room air, BP was in the systolic 90's, 1 L normal saline was given. Pt returned to her neurologic baseline of AOx1 (self). EKG obtained, BMP/lactate/cardiac enzymes drawn. Pt remains in bed with 1 L bolus over a pressure bag. Dr. Garnica was notified and the hospitalist Dr. Cruz was at bedside. Lactate 3.5, given additional 1 L bolus. Lactate normalized to 1.2. Troponin normalized. Pt back to neurologic baseline.   11/8: POD12. BALJINDER overnight. No issues with vitals overnight, patient feels well this am. Staples removed from bicoronal crani incision, healing well.  Stroke code for change in mental status and new L facial, L weakness, workup wnl, also with new gaze preference, stroke following started on Asa 81, Decadron restarted, Briviat changed to Keppra and EEG monitoring   11/9: POD13, BALJINDER, on EEG overnight negative for seizures, 2% stopped and started on salt tabs, repeat sodium stable 140. EEG negative for seizures and d/c'ed.   11/10: POD14, BALJINDER overnight, off EEG (neg for seizure, showed some mod bifrontal slowing R>L). ASA 81mg QD. Neuro stable. MRI brain demonstrating new ischemic strokes, started on atorvastatin, echo with bubble ordered.   11/11: POD 14, BALJINDER o/n. Pending Echo. no issues overnight, intermittent headaches and more sleepy today however neuro status at baseline and moving all extremities non-focally, following commands briskly.   11/12: PD 15. BALJINDER o/n. + bright red blood in stool today. GI consulted, recommended holding SQL (ok to cont ASA), full liquid diet, trend CBCs. Fecal occult negative. BM since occurance with no BBB. UA done also negative for heme.   11/13: POD 16. BALJINDER o/n. Decreased salt tabs to 1q8. Plan for colonoscopy tomorrow. Iron studies pending.   11/14: POD 17. Unable to finish bowel prep overnight, will plan for colonoscopy tomorrow. Echo w/ bubble showed very small PFO.   11/15: POD 18, Colonoscopy complete showing internal hemorrhoids. EP consult for loop recorder. Restarted SQL. discontinued decadron and keppra as per FIORDALIZA. Salt tabs d/c'ed.  11/16: POD 19, BALJINDER overnight. s/p colonoscopy and loop recorder yesterday. Had bowel movement, lisinopril d/c'ed and started on Toprol XL. Lidocaine patch ordered for right knee pain, negative dopplers. Pending AR  11/17: POD20, BALJINDER overnight. , neuro & hemodynamically stable, lidocaine patch helping for R knee pain, pending [place]      Patient evaluated by PT/OT who recommended: AR  Patient is going to [facility] course c/b: CVA (new thalamic infarct on MRI, PFO found on bubble study, treating with Aspirin and discharging with loop recorder), bright red blood per rectum (resolved, s/p colonoscopy with GI showing hermorrhoids), agitation (resolved)     Exam on day of discharge:  General: NAD, pt is comfortably laying in bed, A&O x 2 (self and place), on RA, some word finding difficulties  HEENT: CN II-XII grossly intact, PERRL 3mm, EOMI b/l, face symmetric, tongue midline, neck FROM  Cardiovascular: RRR, normal S1 and S2   Respiratory: lungs CTAB, no wheezing, rhonchi, or crackles   GI: normoactive BS to auscultation, abd soft, NTND   Neuro: no aphasia, speech clear, no dysmetria, no pronator drift  strength 5/5 throughout all 4 extremities  sensation intact to light touch throughout   Extremities: distal pulses 2+ x 4   Wound/incision: bicoronal crani site C/D/I     Patient is neuro stable, vitals stable, afebrile.     HPI:  77 year old female with pmh HTN who lives in [place] presents with left frontal mass, likely meningioma. Per son, meningioma was diagnosed upon CT head performed for memory loss and confusion. Son reports that confusion has been ongoing for several months. Patient admits to mild headache but denies diplopia, blurry vision, nausea, vomiting, extremity numbness or weakness and slurred speech. Patient is preop for bifrontal craniotomy for meningioma resection 10/27.    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 Waxing/waning neuro exam. CTH stable, but with significant edema. Na 143, given 250cc 3% bolus.   10/28: POD1. CTH overnight stable. Oxycodone dc'd due to intermittent lethargy. He removed pending TOV. Failed TOV, straight cath prn.   10/29: POD2. Neuro stable. JPx2. Agitated overnight, precedex started transiently. SBP drop, precedex dc'd and 1L bolus given. Started on seroquel 12.5 at bedtime prn.  10/30: POD3, given additional 12.5 mg seroquel and placed on wrist restraints overnight for agitation. Wrist restraints removed. JUN # 2 removed. Metoprolol increased to 50 BID. Iron studies ordered for microcytic anemia.    10/31: POD4 BALJINDER overnight   11/1: POD5. patient agitated overnight attemtping to leave hospital, security at bedside. ordered 1:1 supervision. psych consulted, recc 10mg melatonin qd, seroquel 100mg BID and zyprexa up to 30mg/day. patient on restraints: b/l wrist/mittens, and soft vest./98 this morning hydral given, pending nutrition consult, upgraded to ICU for decreased level of arousal   11/2: POD 6 mening resection, ICU overnight, mental status improved but not fully back to post op baseline per son. CTH in AM stable, moved to SDU. Seroquel dose decreased per  reccomendations.   11/3: POD 7 meningioma resection. mental status improving.   11/4: POD 8 meningioma resection   11/5: POD 9 meningioma resection. Staples in place. BALJINDER overnight.   11/6: POD 10 resection. BALJINDER overnight.   11/7: POD 11 rsxn, BALJINDER overnight. Pt was in the bathroom brushing her teeth with her daughter with her when she suddenly felt light headed and became unresponsive, the nurse called a Rapid response and patient was brought back to the bed. Vitals were assessed at this time, FS was normal 118, pt was satting 95 on room air, BP was in the systolic 90's, 1 L normal saline was given. Pt returned to her neurologic baseline of AOx1 (self). EKG obtained, BMP/lactate/cardiac enzymes drawn. Pt remains in bed with 1 L bolus over a pressure bag. Dr. Garnica was notified and the hospitalist Dr. Cruz was at bedside. Lactate 3.5, given additional 1 L bolus. Lactate normalized to 1.2. Troponin normalized. Pt back to neurologic baseline.   11/8: POD12. BALJINDER overnight. No issues with vitals overnight, patient feels well this am. Staples removed from bicoronal crani incision, healing well.  Stroke code for change in mental status and new L facial, L weakness, workup wnl, also with new gaze preference, stroke following started on Asa 81, Decadron restarted, Briviat changed to Keppra and EEG monitoring   11/9: POD13, BALJINDER, on EEG overnight negative for seizures, 2% stopped and started on salt tabs, repeat sodium stable 140. EEG negative for seizures and d/c'ed.   11/10: POD14, BALJINDER overnight, off EEG (neg for seizure, showed some mod bifrontal slowing R>L). ASA 81mg QD. Neuro stable. MRI brain demonstrating new ischemic strokes, started on atorvastatin, echo with bubble ordered.   11/11: POD 14, BALJINDER o/n. Pending Echo. no issues overnight, intermittent headaches and more sleepy today however neuro status at baseline and moving all extremities non-focally, following commands briskly.   11/12: PD 15. BALJINDER o/n. + bright red blood in stool today. GI consulted, recommended holding SQL (ok to cont ASA), full liquid diet, trend CBCs. Fecal occult negative. BM since occurance with no BBB. UA done also negative for heme.   11/13: POD 16. BALJINDER o/n. Decreased salt tabs to 1q8. Plan for colonoscopy tomorrow. Iron studies pending.   11/14: POD 17. Unable to finish bowel prep overnight, will plan for colonoscopy tomorrow. Echo w/ bubble showed very small PFO.   11/15: POD 18, Colonoscopy complete showing internal hemorrhoids. EP consult for loop recorder. Restarted SQL. discontinued decadron and keppra as per FIORDALIZA. Salt tabs d/c'ed.  11/16: POD 19, BALJINDER overnight. s/p colonoscopy and loop recorder yesterday. Had bowel movement, lisinopril d/c'ed and started on Toprol XL. Lidocaine patch ordered for right knee pain, negative dopplers. Pending AR  11/17: POD20, BALJINDER overnight. , neuro & hemodynamically stable, lidocaine patch helping for R knee pain, pending [place]  11/18: POD21 BALJINDER overnight, plan for discharge to [place] today       Patient evaluated by PT/OT who recommended: AR  Patient is going to [facility] course c/b: CVA (new thalamic infarct on MRI, PFO found on bubble study, treating with Aspirin and discharging with loop recorder), bright red blood per rectum (resolved, s/p colonoscopy with GI showing hermorrhoids), agitation (resolved)     Exam on day of discharge:  General: NAD, pt is comfortably laying in bed, A&O x 2-3 (with choices), on RA, some word finding difficulties  HEENT: CN II-XII grossly intact, PERRL 3mm, EOMI b/l, face symmetric, tongue midline, neck FROM  Cardiovascular: RRR, normal S1 and S2   Respiratory: lungs CTAB, no wheezing, rhonchi, or crackles   GI: normoactive BS to auscultation, abd soft, NTND   Neuro: no aphasia, speech clear, no dysmetria, no pronator drift  strength 5/5 throughout all 4 extremities  sensation intact to light touch throughout   Extremities: distal pulses 2+ x 4   Wound/incision: bicoronal crani site C/D/I     Patient is neuro stable, vitals stable, afebrile.     HPI:  77 year old female with pmh HTN who lives in [place] presents with left frontal mass, likely meningioma. Per son, meningioma was diagnosed upon CT head performed for memory loss and confusion. Son reports that confusion has been ongoing for several months. Patient admits to mild headache but denies diplopia, blurry vision, nausea, vomiting, extremity numbness or weakness and slurred speech. Patient is preop for bifrontal craniotomy for meningioma resection 10/27.    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 Waxing/waning neuro exam. CTH stable, but with significant edema. Na 143, given 250cc 3% bolus.   10/28: POD1. CTH overnight stable. Oxycodone dc'd due to intermittent lethargy. He removed pending TOV. Failed TOV, straight cath prn.   10/29: POD2. Neuro stable. JPx2. Agitated overnight, precedex started transiently. SBP drop, precedex dc'd and 1L bolus given. Started on seroquel 12.5 at bedtime prn.  10/30: POD3, given additional 12.5 mg seroquel and placed on wrist restraints overnight for agitation. Wrist restraints removed. JUN # 2 removed. Metoprolol increased to 50 BID. Iron studies ordered for microcytic anemia.    10/31: POD4 BALJINDER overnight   11/1: POD5. patient agitated overnight attemtping to leave hospital, security at bedside. ordered 1:1 supervision. psych consulted, recc 10mg melatonin qd, seroquel 100mg BID and zyprexa up to 30mg/day. patient on restraints: b/l wrist/mittens, and soft vest./98 this morning hydral given, pending nutrition consult, upgraded to ICU for decreased level of arousal   11/2: POD 6 mening resection, ICU overnight, mental status improved but not fully back to post op baseline per son. CTH in AM stable, moved to SDU. Seroquel dose decreased per  reccomendations.   11/3: POD 7 meningioma resection. mental status improving.   11/4: POD 8 meningioma resection   11/5: POD 9 meningioma resection. Staples in place. BALJINDER overnight.   11/6: POD 10 resection. BALJINDER overnight.   11/7: POD 11 rsxn, BALJINDER overnight. Pt was in the bathroom brushing her teeth with her daughter with her when she suddenly felt light headed and became unresponsive, the nurse called a Rapid response and patient was brought back to the bed. Vitals were assessed at this time, FS was normal 118, pt was satting 95 on room air, BP was in the systolic 90's, 1 L normal saline was given. Pt returned to her neurologic baseline of AOx1 (self). EKG obtained, BMP/lactate/cardiac enzymes drawn. Pt remains in bed with 1 L bolus over a pressure bag. Dr. Garnica was notified and the hospitalist Dr. Cruz was at bedside. Lactate 3.5, given additional 1 L bolus. Lactate normalized to 1.2. Troponin normalized. Pt back to neurologic baseline.   11/8: POD12. BALJINDER overnight. No issues with vitals overnight, patient feels well this am. Staples removed from bicoronal crani incision, healing well.  Stroke code for change in mental status and new L facial, L weakness, workup wnl, also with new gaze preference, stroke following started on Asa 81, Decadron restarted, Briviat changed to Keppra and EEG monitoring   11/9: POD13, BALJINDER, on EEG overnight negative for seizures, 2% stopped and started on salt tabs, repeat sodium stable 140. EEG negative for seizures and d/c'ed.   11/10: POD14, BALJINDER overnight, off EEG (neg for seizure, showed some mod bifrontal slowing R>L). ASA 81mg QD. Neuro stable. MRI brain demonstrating new ischemic strokes, started on atorvastatin, echo with bubble ordered.   11/11: POD 14, BALJINDER o/n. Pending Echo. no issues overnight, intermittent headaches and more sleepy today however neuro status at baseline and moving all extremities non-focally, following commands briskly.   11/12: PD 15. BALJINDER o/n. + bright red blood in stool today. GI consulted, recommended holding SQL (ok to cont ASA), full liquid diet, trend CBCs. Fecal occult negative. BM since occurance with no BBB. UA done also negative for heme.   11/13: POD 16. BALJINDER o/n. Decreased salt tabs to 1q8. Plan for colonoscopy tomorrow. Iron studies pending.   11/14: POD 17. Unable to finish bowel prep overnight, will plan for colonoscopy tomorrow. Echo w/ bubble showed very small PFO.   11/15: POD 18, Colonoscopy complete showing internal hemorrhoids. EP consult for loop recorder. Restarted SQL. discontinued decadron and keppra as per FIORDALIZA. Salt tabs d/c'ed.  11/16: POD 19, BALJINDER overnight. s/p colonoscopy and loop recorder yesterday. Had bowel movement, lisinopril d/c'ed and started on Toprol XL. Lidocaine patch ordered for right knee pain, negative dopplers. Pending AR  11/17: POD20, BALJINDER overnight. , neuro & hemodynamically stable, lidocaine patch helping for R knee pain, pending [facility]  11/18: POD21 BALIJNDER overnight, plan for discharge to [place] today       Patient evaluated by PT/OT who recommended: AR  Patient is going to [facility] course c/b: CVA (new thalamic infarct on MRI, PFO found on bubble study, treating with Aspirin and discharging with loop recorder), bright red blood per rectum (resolved, s/p colonoscopy with GI showing hermorrhoids), agitation (resolved)     Exam on day of discharge:  General: NAD, pt is comfortably laying in bed, A&O x 2-3 (with choices), on RA, some word finding difficulties  HEENT: CN II-XII grossly intact, PERRL 3mm, EOMI b/l, face symmetric, tongue midline, neck FROM  Cardiovascular: RRR, normal S1 and S2   Respiratory: lungs CTAB, no wheezing, rhonchi, or crackles   GI: normoactive BS to auscultation, abd soft, NTND   Neuro: no aphasia, speech clear, no dysmetria, no pronator drift  strength 5/5 throughout all 4 extremities  sensation intact to light touch throughout   Extremities: distal pulses 2+ x 4   Wound/incision: bicoronal crani site C/D/I     Patient is neuro stable, vitals stable, afebrile.    Discharge NIHSS: 2

## 2022-11-14 NOTE — PROGRESS NOTE ADULT - PROBLEM SELECTOR PLAN 2
New thalamic infarct on MRI  - c/w ASA  - started atorvastatin  - TTE wnl, small PFO on bubble study  - stroke team following

## 2022-11-14 NOTE — PROGRESS NOTE ADULT - PROBLEM SELECTOR PLAN 1
- s/p resection 10/27  - Course complicated by agitation, now improved  - seizure prophylaxis per primary team  - Management per primary team

## 2022-11-14 NOTE — PROGRESS NOTE ADULT - SUBJECTIVE AND OBJECTIVE BOX
HPI:  77 year old female with pmh HTN who lives in [place] presents with left frontal mass, likely meningioma. Per son, meningioma was diagnosed upon CT head performed for memory loss and confusion. Son reports that confusion has been ongoing for several months. Patient admits to mild headache but denies diplopia, blurry vision, nausea, vomiting, extremity numbness or weakness and slurred speech. Patient is preop for bifrontal craniotomy for meningioma resection 10/27.   (26 Oct 2022 10:58)    INTERVAL EVENTS:  No complaints, explained plan for possible colonoscopy today    HOSPITAL COURSE:  10/27: POD# 0 S/P bifrontal craniotomy for removal of skull base mass (frozen: meningioma.) Postop, Pt reports mild headache otherwise neurologically stable. Waxing/waning neuro exam. CTH stable, but with significant edema. Na 143, given 250cc 3% bolus.   10/28: POD1. CTH overnight stable. Oxycodone dc'd due to intermittent lethargy. He removed pending TOV. Failed TOV, straight cath prn.   10/29: POD2. Neuro stable. JPx2. Agitated overnight, precedex started transiently. SBP drop, precedex dc'd and 1L bolus given. Started on seroquel 12.5 at bedtime prn.  10/30: POD3, given additional 12.5 mg seroquel and placed on wrist restraints overnight for agitation. Wrist restraints removed. JUN # 2 removed. Metoprolol increased to 50 BID. Iron studies ordered for microcytic anemia.    10/31: POD4 BALJINDER overnight   11/1: POD5. patient agitated overnight attemtping to leave hospital, security at bedside. ordered 1:1 supervision. psych consulted, recc 10mg melatonin qd, seroquel 100mg BID and zyprexa up to 30mg/day. patient on restraints: b/l wrist/mittens, and soft vest./98 this morning hydral given, pending nutrition consult, upgraded to ICU for decreased level of arousal   11/2: POD 6 mening resection, ICU overnight, mental status improved but not fully back to post op baseline per son. CTH in AM stable, moved to SDU. Seroquel dose decreased per  reccomendations.   11/3: POD 7 meningioma resection. mental status improving.   11/4: POD 8 meningioma resection   11/5: POD 9 meningioma resection. Staples in place. BALJINDER overnight.   11/6: POD 10 resection. BALJINDER overnight.   11/7: POD 11 rsxn, BALJINDER overnight. Pt was in the bathroom brushing her teeth with her daughter with her when she suddenly felt light headed and became unresponsive, the nurse called a Rapid response and patient was brought back to the bed. Vitals were assessed at this time, FS was normal 118, pt was satting 95 on room air, BP was in the systolic 90's, 1 L normal saline was given. Pt returned to her neurologic baseline of AOx1 (self). EKG obtained, BMP/lactate/cardiac enzymes drawn. Pt remains in bed with 1 L bolus over a pressure bag. Dr. Garnica was notified and the hospitalist Dr. Crzu was at bedside. Lactate 3.5, given additional 1 L bolus. Lactate normalized to 1.2. Troponin normalized. Pt back to neurologic baseline.   11/8: POD12. BALJINDER overnight. No issues with vitals overnight, patient feels well this am. Staples removed from bicoronal crani incision, healing well.  Stroke code for change in mental status and new L facial, L weakness, workup wnl, also with new gaze preference, stroke following started on Asa 81, Decadron restarted, Briviat changed to Keppra and EEG monitoring   11/9: POD13, BALJINDER, on EEG overnight negative for seizures, 2% stopped and started on salt tabs, repeat sodium stable 140. EEG negative for seizures and d/c'ed.   11/10: POD14, BALJINDER overnight, off EEG (neg for seizure, showed some mod bifrontal slowing R>L). ASA 81mg QD. Neuro stable. MRI brain demonstrating new ischemic strokes, started on atorvastatin, echo with bubble ordered.   11/11: POD 14, BALJINDER o/n. Pending Echo. no issues overnight, intermittent headaches and more sleepy today however neuro status at baseline and moving all extremities non-focally, following commands briskly.   11/12: PD 15. BALJINDER o/n. + bright red blood in stool today. GI consulted, recommended holding SQL (ok to cont ASA), full liquid diet, trend CBCs. Fecal occult negative. BM since occurance with no BBB. UA done also negative for heme.   11/13: POD 16. BALJINDER o/n. Decreased salt tabs to 1q8. Plan for colonoscopy tomorrow. Iron studies pending.   11/14: POD 17. bowel prep overnight for colonoscopy.    Vital Signs Last 24 Hrs  T(C): 36.6 (14 Nov 2022 00:20), Max: 37.2 (13 Nov 2022 16:27)  T(F): 97.8 (14 Nov 2022 00:20), Max: 98.9 (13 Nov 2022 16:27)  HR: 64 (14 Nov 2022 00:20) (62 - 73)  BP: 140/77 (14 Nov 2022 00:20) (116/74 - 156/81)  BP(mean): 102 (13 Nov 2022 05:30) (102 - 102)  RR: 16 (14 Nov 2022 00:20) (16 - 19)  SpO2: 95% (14 Nov 2022 00:20) (95% - 98%)    Parameters below as of 14 Nov 2022 00:20  Patient On (Oxygen Delivery Method): room air        I&O's Summary    12 Nov 2022 07:01  -  13 Nov 2022 07:00  --------------------------------------------------------  IN: 200 mL / OUT: 0 mL / NET: 200 mL    13 Nov 2022 07:01  -  14 Nov 2022 00:50  --------------------------------------------------------  IN: 1095 mL / OUT: 1750 mL / NET: -655 mL        PHYSICAL EXAM:  General: NAD, pt is comfortably laying in bed, A&O x 2-3 (self and place), on RA  HEENT: CN II-XII grossly intact, PERRL 3mm, EOMI b/l, face symmetric, tongue midline, neck FROM  Cardiovascular: RRR, normal S1 and S2   Respiratory: lungs CTAB, no wheezing, rhonchi, or crackles   GI: normoactive BS to auscultation, abd soft, NTND   Neuro: no aphasia, speech clear, no dysmetria, no pronator drift  strength 5/5 throughout all 4 extremities  sensation intact to light touch throughout   Extremities: distal pulses 2+ x 4   Wound/incision: bicoronal crani site C/D/I       LABS:                        9.4    9.59  )-----------( 260      ( 13 Nov 2022 06:24 )             29.9     11-13    143  |  110<H>  |  21  ----------------------------<  95  3.9   |  26  |  0.66    Ca    8.8      13 Nov 2022 06:24  Phos  3.7     11-13  Mg     1.9     11-13      PT/INR - ( 13 Nov 2022 06:24 )   PT: 14.7 sec;   INR: 1.23          PTT - ( 13 Nov 2022 06:24 )  PTT:27.4 sec  Urinalysis Basic - ( 12 Nov 2022 19:37 )    Color: Yellow / Appearance: Clear / SG: >=1.030 / pH: x  Gluc: x / Ketone: NEGATIVE  / Bili: Negative / Urobili: 0.2 E.U./dL   Blood: x / Protein: NEGATIVE mg/dL / Nitrite: NEGATIVE   Leuk Esterase: NEGATIVE / RBC: x / WBC x   Sq Epi: x / Non Sq Epi: x / Bacteria: x          CAPILLARY BLOOD GLUCOSE          Drug Levels: [] N/A    CSF Analysis: [] N/A      Allergies    No Known Drug Allergies  strawberry (Hives)    Intolerances      MEDICATIONS:  Antibiotics:    Neuro:  acetaminophen     Tablet .. 650 milliGRAM(s) Oral every 6 hours PRN  levETIRAcetam 500 milliGRAM(s) Oral two times a day    Anticoagulation:  aspirin  chewable 81 milliGRAM(s) Oral daily    OTHER:  atorvastatin 40 milliGRAM(s) Oral at bedtime  benzocaine 15 mG/menthol 3.6 mG Lozenge 1 Lozenge Oral daily  bisacodyl 5 milliGRAM(s) Oral daily PRN  dexAMETHasone     Tablet 2 milliGRAM(s) Oral every 12 hours  metoprolol tartrate 50 milliGRAM(s) Oral two times a day  pantoprazole    Tablet 40 milliGRAM(s) Oral before breakfast  polyethylene glycol 3350 17 Gram(s) Oral two times a day  senna 2 Tablet(s) Oral at bedtime    IVF:  sodium chloride 1 Gram(s) Oral every 8 hours  sodium chloride 0.9%. 1000 milliLiter(s) IV Continuous <Continuous>    CULTURES:    RADIOLOGY & ADDITIONAL TESTS:      ASSESSMENT:  76 y/o female with h/o HTN s/p bifrontal craniotomy for meningioma resection (10/27/22). Post op course c/b delirium, new thalamic strokes on MRI, and LGIB.     Plan:  Neuro:  - Neuro checks/vital signs q4  - Pain control PRN  - keppra 500 BID 11/8  - Decadron 2q12  - CTH 11/8 (stroke code) stable but persistent edema, no new acute findings   - EEG 11/8, mod bifrontal slowing R>L, no seizures recorded, d/c'ed   - MRI brain 11/10 demonstrating thalamic ischemic strokes  - Stroke neuro following: Pend echo with bubble and possible loop recorder depending on findings     Cardiac:  - -140  - Holding home lisinopril d/t hypotension   - Increased home metoprolol to 50 mg BID 10/30  - Echo 11/10: mild LVH EF 75%   - Echo with bubble pending to r/o PFO per stroke  - Atorvastatin 40 mg daily for stroke core measures     Pulmonary:  - on RA, IS    GI:  - clear liquid diet -> NPO after midnight 11/13   - Bowel regimen  - PPI while on steroids  - GI consulted for bloody stool (LGIB) 11/12, plan for colonoscopy 11/14    Renal:  - Voiding intermittently, straight cath PRN   - salt tabs 1q8, continue to wean     Heme:  - SCDs, SQL held due to possible GI bleed  - LE dopplers 10/26: neg for DVT  - Microcytic anemia: f/u iron studies     Endo:  - A1C 5.1, no issues    ID:  - afebrile, trend leukocytosis  - augmentin x 10 days (10/31-11/10) per Roseline     Disposition:   - Tele, full code  - PT/OT recs AR, family updated with plan    Assessment and Plan d/w Dr. Garnica

## 2022-11-14 NOTE — PROGRESS NOTE ADULT - PROBLEM SELECTOR PLAN 6
- c/w lopressor  - holding lisinopril, can restart if remains hypertensive but would continue to hold in setting of possible GI bleed  - encouraging good PO intake

## 2022-11-14 NOTE — PROGRESS NOTE ADULT - SUBJECTIVE AND OBJECTIVE BOX
Pt seen and examined at bedside.  NAVIN. Pt unable to complete prep for colonoscopy. Only able to finish 1/2 prep. No bloody noted in stool per RN this AM    REVIEW OF SYSTEMS:  Constitutional: No fever, weight loss or fatigue  Cardiovascular: No chest pain, palpitations, dizziness or leg swelling  Gastrointestinal: No abdominal or epigastric pain. No nausea, vomiting or hematemesis; No diarrhea or constipation. No melena or hematochezia.  Skin: No itching, burning, rashes or lesions     Allergies    No Known Drug Allergies  strawberry (Hives)    Intolerances        MEDICATIONS:  MEDICATIONS  (STANDING):  aspirin  chewable 81 milliGRAM(s) Oral daily  atorvastatin 40 milliGRAM(s) Oral at bedtime  benzocaine 15 mG/menthol 3.6 mG Lozenge 1 Lozenge Oral daily  dexAMETHasone     Tablet 2 milliGRAM(s) Oral every 12 hours  levETIRAcetam 500 milliGRAM(s) Oral two times a day  metoprolol tartrate 50 milliGRAM(s) Oral two times a day  pantoprazole    Tablet 40 milliGRAM(s) Oral before breakfast  polyethylene glycol 3350 17 Gram(s) Oral two times a day  senna 2 Tablet(s) Oral at bedtime  sodium chloride 1 Gram(s) Oral every 8 hours  sodium chloride 0.9%. 1000 milliLiter(s) (75 mL/Hr) IV Continuous <Continuous>    MEDICATIONS  (PRN):  acetaminophen     Tablet .. 650 milliGRAM(s) Oral every 6 hours PRN Mild Pain (1 - 3)  bisacodyl 5 milliGRAM(s) Oral daily PRN Constipation      Vital Signs Last 24 Hrs  T(C): 36.3 (14 Nov 2022 05:24), Max: 37.2 (13 Nov 2022 16:27)  T(F): 97.4 (14 Nov 2022 05:24), Max: 98.9 (13 Nov 2022 16:27)  HR: 72 (14 Nov 2022 05:24) (63 - 72)  BP: 141/82 (14 Nov 2022 05:24) (121/77 - 156/81)  BP(mean): --  RR: 18 (14 Nov 2022 05:24) (16 - 18)  SpO2: 97% (14 Nov 2022 05:24) (95% - 98%)    Parameters below as of 14 Nov 2022 05:24  Patient On (Oxygen Delivery Method): room air        11-13 @ 07:01  -  11-14 @ 07:00  --------------------------------------------------------  IN: 1620 mL / OUT: 1750 mL / NET: -130 mL        PHYSICAL EXAM:    General: No acute distress  HEENT: MMM, conjunctiva and sclera clear  Gastrointestinal: Soft non-tender non-distended. No rebound or guarding  Skin: Warm and dry. No obvious rash    LABS:  CBC Full  -  ( 14 Nov 2022 06:31 )  WBC Count : 8.30 K/uL  RBC Count : 4.32 M/uL  Hemoglobin : 9.9 g/dL  Hematocrit : 31.4 %  Platelet Count - Automated : 199 K/uL  Mean Cell Volume : 72.7 fl  Mean Cell Hemoglobin : 22.9 pg  Mean Cell Hemoglobin Concentration : 31.5 gm/dL  Auto Neutrophil # : x  Auto Lymphocyte # : x  Auto Monocyte # : x  Auto Eosinophil # : x  Auto Basophil # : x  Auto Neutrophil % : x  Auto Lymphocyte % : x  Auto Monocyte % : x  Auto Eosinophil % : x  Auto Basophil % : x    11-14    144  |  110<H>  |  11  ----------------------------<  85  3.6   |  26  |  0.64    Ca    8.6      14 Nov 2022 06:31  Phos  3.4     11-14  Mg     1.7     11-14      PT/INR - ( 14 Nov 2022 06:31 )   PT: 15.6 sec;   INR: 1.31          PTT - ( 14 Nov 2022 06:31 )  PTT:26.7 sec      Urinalysis Basic - ( 12 Nov 2022 19:37 )    Color: Yellow / Appearance: Clear / SG: >=1.030 / pH: x  Gluc: x / Ketone: NEGATIVE  / Bili: Negative / Urobili: 0.2 E.U./dL   Blood: x / Protein: NEGATIVE mg/dL / Nitrite: NEGATIVE   Leuk Esterase: NEGATIVE / RBC: x / WBC x   Sq Epi: x / Non Sq Epi: x / Bacteria: x

## 2022-11-14 NOTE — PROGRESS NOTE ADULT - ATTENDING COMMENTS
Pt seen and d/w fellow.  Pt with anemia and bloody stool.  Will plan for a colonoscopy tomorrow.
78yo W with PMH of hyperthyroidism s/p thyroidectomy who is admitted for stroke, GI is consulted for blood per rectum and drop in Hb.     No further rectal bleeding, Hb stable, plan for colonoscopy.    Agree with plan above.    CEDRICK Gibson MD  GI Attending

## 2022-11-14 NOTE — DISCHARGE NOTE PROVIDER - NSDCFUADDAPPT_GEN_ALL_CORE_FT
Please follow up with Dr. Garnica from Neurosurgery outpatient    Please follow up with GI outpatient    Please follow up with Neurology outpatient    Please follow up with your primary care doctor outpatient  Please follow up with Dr. Garnica from Neurosurgery outpatient, call the office to make/confirm appointment at [phone]     Please follow up with Dr. Mcdaniel from Stroke Neurology outpatient    Please follow up with EP outpatient     Please follow up with Dr. Torres from GI if needed outpatient    Please follow up with your primary care doctor outpatient  Please follow up with Dr. Garnica from Neurosurgery outpatient, call the office to make/confirm appointment at [phone]     Please follow up with Dr. Mcdaniel from Stroke Neurology outpatient.    Please follow up with EP outpatient in 4-6 weeks. Call [phone] for an appointment.     Please follow up with Dr. Torres from GI if needed outpatient.    Please follow up with your primary care doctor outpatient

## 2022-11-14 NOTE — DISCHARGE NOTE PROVIDER - CARE PROVIDERS DIRECT ADDRESSES
[email or web address]

## 2022-11-15 LAB
ANION GAP SERPL CALC-SCNC: 12 MMOL/L — SIGNIFICANT CHANGE UP (ref 5–17)
BUN SERPL-MCNC: 8 MG/DL — SIGNIFICANT CHANGE UP (ref 7–23)
CALCIUM SERPL-MCNC: 8.8 MG/DL — SIGNIFICANT CHANGE UP (ref 8.4–10.5)
CHLORIDE SERPL-SCNC: 109 MMOL/L — HIGH (ref 96–108)
CO2 SERPL-SCNC: 24 MMOL/L — SIGNIFICANT CHANGE UP (ref 22–31)
CREAT SERPL-MCNC: 0.69 MG/DL — SIGNIFICANT CHANGE UP (ref 0.5–1.3)
EGFR: 90 ML/MIN/1.73M2 — SIGNIFICANT CHANGE UP
GLUCOSE SERPL-MCNC: 83 MG/DL — SIGNIFICANT CHANGE UP (ref 70–99)
HCT VFR BLD CALC: 32.3 % — LOW (ref 34.5–45)
HGB BLD-MCNC: 10.4 G/DL — LOW (ref 11.5–15.5)
MAGNESIUM SERPL-MCNC: 1.6 MG/DL — SIGNIFICANT CHANGE UP (ref 1.6–2.6)
MCHC RBC-ENTMCNC: 23.1 PG — LOW (ref 27–34)
MCHC RBC-ENTMCNC: 32.2 GM/DL — SIGNIFICANT CHANGE UP (ref 32–36)
MCV RBC AUTO: 71.6 FL — LOW (ref 80–100)
NRBC # BLD: 0 /100 WBCS — SIGNIFICANT CHANGE UP (ref 0–0)
PHOSPHATE SERPL-MCNC: 3.4 MG/DL — SIGNIFICANT CHANGE UP (ref 2.5–4.5)
PLATELET # BLD AUTO: 193 K/UL — SIGNIFICANT CHANGE UP (ref 150–400)
POTASSIUM SERPL-MCNC: 3.6 MMOL/L — SIGNIFICANT CHANGE UP (ref 3.5–5.3)
POTASSIUM SERPL-SCNC: 3.6 MMOL/L — SIGNIFICANT CHANGE UP (ref 3.5–5.3)
RBC # BLD: 4.51 M/UL — SIGNIFICANT CHANGE UP (ref 3.8–5.2)
RBC # FLD: 17.2 % — HIGH (ref 10.3–14.5)
SODIUM SERPL-SCNC: 145 MMOL/L — SIGNIFICANT CHANGE UP (ref 135–145)
WBC # BLD: 7.85 K/UL — SIGNIFICANT CHANGE UP (ref 3.8–10.5)
WBC # FLD AUTO: 7.85 K/UL — SIGNIFICANT CHANGE UP (ref 3.8–10.5)

## 2022-11-15 PROCEDURE — 45378 DIAGNOSTIC COLONOSCOPY: CPT | Mod: GC

## 2022-11-15 PROCEDURE — 99232 SBSQ HOSP IP/OBS MODERATE 35: CPT | Mod: 25

## 2022-11-15 PROCEDURE — 99024 POSTOP FOLLOW-UP VISIT: CPT

## 2022-11-15 PROCEDURE — 33285 INSJ SUBQ CAR RHYTHM MNTR: CPT

## 2022-11-15 RX ORDER — ENOXAPARIN SODIUM 100 MG/ML
40 INJECTION SUBCUTANEOUS
Qty: 0 | Refills: 0 | DISCHARGE
Start: 2022-11-15

## 2022-11-15 RX ORDER — ATORVASTATIN CALCIUM 80 MG/1
1 TABLET, FILM COATED ORAL
Qty: 0 | Refills: 0 | DISCHARGE
Start: 2022-11-15

## 2022-11-15 RX ORDER — LIDOCAINE HCL 20 MG/ML
1 VIAL (ML) INJECTION ONCE
Refills: 0 | Status: DISCONTINUED | OUTPATIENT
Start: 2022-11-15 | End: 2022-11-18

## 2022-11-15 RX ORDER — SENNA PLUS 8.6 MG/1
2 TABLET ORAL
Qty: 0 | Refills: 0 | DISCHARGE
Start: 2022-11-15

## 2022-11-15 RX ORDER — ACETAMINOPHEN 500 MG
2 TABLET ORAL
Qty: 0 | Refills: 0 | DISCHARGE
Start: 2022-11-15

## 2022-11-15 RX ORDER — MAGNESIUM OXIDE 400 MG ORAL TABLET 241.3 MG
400 TABLET ORAL ONCE
Refills: 0 | Status: COMPLETED | OUTPATIENT
Start: 2022-11-15 | End: 2022-11-15

## 2022-11-15 RX ORDER — ASPIRIN/CALCIUM CARB/MAGNESIUM 324 MG
0.5 TABLET ORAL
Qty: 0 | Refills: 0 | DISCHARGE

## 2022-11-15 RX ORDER — LISINOPRIL 2.5 MG/1
1 TABLET ORAL
Qty: 0 | Refills: 0 | DISCHARGE

## 2022-11-15 RX ORDER — ENOXAPARIN SODIUM 100 MG/ML
40 INJECTION SUBCUTANEOUS EVERY 24 HOURS
Refills: 0 | Status: DISCONTINUED | OUTPATIENT
Start: 2022-11-15 | End: 2022-11-18

## 2022-11-15 RX ORDER — METOPROLOL TARTRATE 50 MG
100 TABLET ORAL DAILY
Refills: 0 | Status: DISCONTINUED | OUTPATIENT
Start: 2022-11-16 | End: 2022-11-18

## 2022-11-15 RX ORDER — POTASSIUM CHLORIDE 20 MEQ
40 PACKET (EA) ORAL ONCE
Refills: 0 | Status: COMPLETED | OUTPATIENT
Start: 2022-11-15 | End: 2022-11-15

## 2022-11-15 RX ORDER — HYDROCORTISONE 1 %
1 OINTMENT (GRAM) TOPICAL DAILY
Refills: 0 | Status: DISCONTINUED | OUTPATIENT
Start: 2022-11-15 | End: 2022-11-18

## 2022-11-15 RX ORDER — ASPIRIN/CALCIUM CARB/MAGNESIUM 324 MG
1 TABLET ORAL
Qty: 0 | Refills: 0 | DISCHARGE
Start: 2022-11-15

## 2022-11-15 RX ADMIN — PANTOPRAZOLE SODIUM 40 MILLIGRAM(S): 20 TABLET, DELAYED RELEASE ORAL at 06:25

## 2022-11-15 RX ADMIN — Medication 50 MILLIGRAM(S): at 12:34

## 2022-11-15 RX ADMIN — SODIUM CHLORIDE 75 MILLILITER(S): 9 INJECTION INTRAMUSCULAR; INTRAVENOUS; SUBCUTANEOUS at 04:36

## 2022-11-15 RX ADMIN — BENZOCAINE AND MENTHOL 1 LOZENGE: 5; 1 LIQUID ORAL at 12:34

## 2022-11-15 RX ADMIN — ENOXAPARIN SODIUM 40 MILLIGRAM(S): 100 INJECTION SUBCUTANEOUS at 22:43

## 2022-11-15 RX ADMIN — Medication 650 MILLIGRAM(S): at 04:37

## 2022-11-15 RX ADMIN — LEVETIRACETAM 500 MILLIGRAM(S): 250 TABLET, FILM COATED ORAL at 06:25

## 2022-11-15 RX ADMIN — SENNA PLUS 2 TABLET(S): 8.6 TABLET ORAL at 22:43

## 2022-11-15 RX ADMIN — SODIUM CHLORIDE 1 GRAM(S): 9 INJECTION INTRAMUSCULAR; INTRAVENOUS; SUBCUTANEOUS at 06:25

## 2022-11-15 RX ADMIN — Medication 81 MILLIGRAM(S): at 12:34

## 2022-11-15 RX ADMIN — Medication 650 MILLIGRAM(S): at 03:37

## 2022-11-15 RX ADMIN — Medication 40 MILLIEQUIVALENT(S): at 07:40

## 2022-11-15 RX ADMIN — ATORVASTATIN CALCIUM 40 MILLIGRAM(S): 80 TABLET, FILM COATED ORAL at 22:43

## 2022-11-15 RX ADMIN — MAGNESIUM OXIDE 400 MG ORAL TABLET 400 MILLIGRAM(S): 241.3 TABLET ORAL at 07:41

## 2022-11-15 RX ADMIN — Medication 2 MILLIGRAM(S): at 06:25

## 2022-11-15 NOTE — PRE-ANESTHESIA EVALUATION ADULT - NSANTHADDINFOFT_GEN_ALL_CORE
Eileen [phone].  Daughter informed that patient will have colonoscopy on 11/15. Eileen [phone].  Daughter informed that patient will have colonoscopy on 11/15. Diaz Jennings, (Son) consented for procedure on mother's behalf. Risks, benefits and alternatives discussed; including but not limited to headache, nausea, bleeding, infection and local trauma/positioning related injury. All questions answered. The patient agrees to proceed.

## 2022-11-15 NOTE — PROGRESS NOTE ADULT - PROBLEM SELECTOR PLAN 7
- in post operative period  - Would stop all seroquel given episodes of nonresponsiveness - in post operative period  - Would stop all seroquel given episodes of nonresponsiveness, has been calm off seroquel

## 2022-11-15 NOTE — PROGRESS NOTE ADULT - SUBJECTIVE AND OBJECTIVE BOX
SUBJECTIVE:  Patient seen and examined at bedside.  Eating sitting up, comfortable.  No changes in mental status, remains pleasant but tangential    ROS:  Denies fevers, chills, headache, vision changes, neck pain, cough, SOB, chest pain, Abdominal pain, N/V, dysuria or new rash.  All other ROS negative except as above    Vital Signs Last 12 Hrs  T(F): 97.2 (11-15-22 @ 12:50), Max: 98.8 (11-15-22 @ 10:43)  HR: 82 (11-15-22 @ 12:50) (66 - 82)  BP: 143/71 (11-15-22 @ 12:50) (143/71 - 164/79)  BP(mean): 102 (11-15-22 @ 07:22) (102 - 102)  RR: 15 (11-15-22 @ 12:50) (15 - 18)  SpO2: 96% (11-15-22 @ 12:50) (96% - 99%)  I&O's Summary    14 Nov 2022 07:01  -  15 Nov 2022 07:00  --------------------------------------------------------  IN: 900 mL / OUT: 500 mL / NET: 400 mL    15 Nov 2022 07:01  -  15 Nov 2022 15:53  --------------------------------------------------------  IN: 180 mL / OUT: 0 mL / NET: 180 mL        PHYSICAL EXAM:  Constitutional: NAD, comfortable in bed.  HEENT: PERRLA, EOMI, MMM, crani incision c/d/i  Neck: Supple  Respiratory: CTA B/L. No w/r/r.   Cardiovascular: RRR, normal S1 and S2, no m/r/g.   Gastrointestinal: +BS, soft NTND, no guarding or rebound tenderness, no palpable masses   Extremities: wwp; no cyanosis, clubbing or edema.   Vascular: Pulses equal and strong throughout.   Neurological: AAOx1-2 to self and sometimes hospital, tangential, strength and sensation intact throughout        LABS:                        10.4   7.85  )-----------( 193      ( 15 Nov 2022 05:30 )             32.3     11-15    145  |  109<H>  |  8   ----------------------------<  83  3.6   |  24  |  0.69    Ca    8.8      15 Nov 2022 05:30  Phos  3.4     11-15  Mg     1.6     11-15      PT/INR - ( 14 Nov 2022 06:31 )   PT: 15.6 sec;   INR: 1.31          PTT - ( 14 Nov 2022 06:31 )  PTT:26.7 sec        RADIOLOGY & ADDITIONAL TESTS:  No new imaging    MEDICATIONS  (STANDING):  aspirin  chewable 81 milliGRAM(s) Oral daily  atorvastatin 40 milliGRAM(s) Oral at bedtime  benzocaine 15 mG/menthol 3.6 mG Lozenge 1 Lozenge Oral daily  enoxaparin Injectable 40 milliGRAM(s) SubCutaneous every 24 hours  lidocaine 2% Injectable 1 Vial(s) Local Injection once  senna 2 Tablet(s) Oral at bedtime    MEDICATIONS  (PRN):  acetaminophen     Tablet .. 650 milliGRAM(s) Oral every 6 hours PRN Mild Pain (1 - 3)  bisacodyl 5 milliGRAM(s) Oral daily PRN Constipation

## 2022-11-15 NOTE — PRE-ANESTHESIA EVALUATION ADULT - NSANTHPMHFT_GEN_ALL_CORE
78 y/o female with h/o HTN s/p bifrontal craniotomy for meningioma resection (10/27/22). Post op course c/b delirium, new thalamic strokes on MRI, and LGIB.

## 2022-11-15 NOTE — PRE-ANESTHESIA EVALUATION ADULT - NSANTHPEFT_GEN_ALL_CORE
Cardiovascular: RRR, normal S1 and S2   Respiratory: lungs CTAB, no wheezing, rhonchi, or crackles   GI: normoactive BS to auscultation, abd soft, NTND   Neuro: no aphasia, speech clear, no dysmetria, no pronator drift strength 5/5 throughout all 4 extremities sensation intact to light touch throughout,   AAOx1 to self, tangential, strength and sensation intact throughout   Extremities: distal pulses 2+ x 4   Wound/incision: bicoronal crani site C/D/I

## 2022-11-15 NOTE — CHART NOTE - NSCHARTNOTEFT_GEN_A_CORE
Patient is s/p Colonoscopy performed in Endoscopy    Small grade/stage I internal hemorrhoids and stigmata of recent bleeding were noted.  Likely the source of bright red blood per rectum.	    The colon was tortuous and required external abdominal pressure to traverse to the cecum.	  The colon was otherwise unremarkable.    Plan:	  Return to floor for further management  Continue current medical regimen.  Advance diet as tolerated  High Fiber Diet    Thank you for allowing us to participate in the care of this patient. GI will sign off the case.  Please call us if you have any further questions or concerns    Rodolfo Sierra M.D.  Gastroenterology Fellow  Weekday Pager: [phone]  Weeknights/Weekend Coverage: Please Call the  for contact info

## 2022-11-15 NOTE — PRE-ANESTHESIA EVALUATION ADULT - NSANTHOSAYNRD_GEN_A_CORE
No. ALVARADO screening performed.  STOP BANG Legend: 0-2 = LOW Risk; 3-4 = INTERMEDIATE Risk; 5-8 = HIGH Risk
No. ALVARADO screening performed.  STOP BANG Legend: 0-2 = LOW Risk; 3-4 = INTERMEDIATE Risk; 5-8 = HIGH Risk

## 2022-11-15 NOTE — PROGRESS NOTE ADULT - PROBLEM SELECTOR PLAN 2
New thalamic infarct on MRI  - c/w ASA  - started atorvastatin  - TTE wnl, small PFO on bubble study  - stroke team following New thalamic infarct on MRI  - c/w ASA  - started atorvastatin  - TTE wnl, small PFO on bubble study  - stroke team following  - loop recorder today

## 2022-11-15 NOTE — PROGRESS NOTE ADULT - PROBLEM SELECTOR PLAN 6
- c/w lopressor  - holding lisinopril, can restart if remains hypertensive but would continue to hold in setting of possible GI bleed  - encouraging good PO intake Lopressor converted to Toprol  - holding lisinopril, can restart if remains hypertensive but would continue to hold in setting of possible GI bleed  - encouraging good PO intake

## 2022-11-15 NOTE — PROGRESS NOTE ADULT - PROBLEM SELECTOR PLAN 8
- Anemia with downtrending Hgb since admission of 11.2  - Stable  - No evidence of acute blood loss at this time, likely in the post operative state    DVT prophy: lovenox  Dispo: AR vs home with family assistance pending clinical improvement - Anemia with downtrending Hgb since admission of 11.2  - Stable  - No evidence of acute blood loss at this time, likely in the post operative state    DVT prophy: lovenox resumed  Dispo: AR pending auth

## 2022-11-15 NOTE — PROGRESS NOTE ADULT - SUBJECTIVE AND OBJECTIVE BOX
Procedure- ILR implant  Location- Bedside, [facility]    The rationale for insertion of an implantable loop recorder was discussed with the patient in detail.  The risks of infection, bleeding, pocket hematoma and pain were discussed.  Vendor presence for technical support was also discussed.   Alternatives were reviewed and all questions were answered.  The patient agrees to proceed, and informed consent was signed and placed in the chart.  The patient was in a non-fasting state.    Cloroprep was used to clean procedure site (L chest) and patient was draped in a sterile manner.  The procedure was performed under local anesthetic only.  The site was infiltrated with 1% Lidocaine with 0.001% Epi.  A small incision was made with a specialized scalpel in the region of the fourth intercostal space along the left sternal border.  Using the ILR insertion tool the ILR was “injected” in the subcutaneous plane and deployed (please see CCW report for model number) in the region of optimal R-wave sensing.  Hemostasis was achieved and the wound was closed in a running fashion using 4-0 Monocryl suture.  Skin was closed using Dermabond.  Tegaderm was placed over incision.    Complications- None.        Plan of Care-   -	Patient to keep Tegaderm on for 24 hours and then should be removed by the patient.    -	Patient should follow up with EP in 4-6 weeks (call [phone] for an appointment)

## 2022-11-15 NOTE — CHART NOTE - NSCHARTNOTEFT_GEN_A_CORE
Admitting Diagnosis:   Patient is a 77y old  Female who presents with a chief complaint of meningioma (15 Nov 2022 01:59)      PAST MEDICAL & SURGICAL HISTORY:  Meningioma      Hypertension      H/O hyperthyroidism  s/p thyroid surgery, no longer on medication      Status post thyroid surgery          Current Nutrition Order:   Clear liquid diet    PO Intake: Good (%) [   ]  Fair (50-75%) [   ] Poor (<25%) [   ] -- pending clear liquid intake    GI Issues: No observed signs of n/v/d/c. last BM   No abd discomfort or distention    Pain: Denies pain    Skin Integrity: Wenceslao 20, surgical incision noted  No PU or edema    Labs:   11-15    145  |  109<H>  |  8   ----------------------------<  83  3.6   |  24  |  0.69    Ca    8.8      15 Nov 2022 05:30  Phos  3.4     11-15  Mg     1.6     11-15      CAPILLARY BLOOD GLUCOSE          Medications:  MEDICATIONS  (STANDING):  aspirin  chewable 81 milliGRAM(s) Oral daily  atorvastatin 40 milliGRAM(s) Oral at bedtime  benzocaine 15 mG/menthol 3.6 mG Lozenge 1 Lozenge Oral daily  enoxaparin Injectable 40 milliGRAM(s) SubCutaneous every 24 hours  senna 2 Tablet(s) Oral at bedtime  sodium chloride 0.9%. 1000 milliLiter(s) (75 mL/Hr) IV Continuous <Continuous>    MEDICATIONS  (PRN):  acetaminophen     Tablet .. 650 milliGRAM(s) Oral every 6 hours PRN Mild Pain (1 - 3)  bisacodyl 5 milliGRAM(s) Oral daily PRN Constipation    Adm Anthropometrics:  Height for BMI (FEET)	5 Feet  Height for BMI (INCHES)	8 Inch(s)  Height for BMI (CENTIMETERS)	172.72 Centimeter(s)  Weight for BMI (lbs)	170 lb  Weight for BMI (kg)	77.1 kg  Body Mass Index	25.8    Weight Change: No new wts in EMR. Please obtain new wts weekly as able to trend    Estimated energy needs:   IBW used for calculations as pt >120% of IBW (121%), adjusted for wound healing, age  20-25kcal/k-1587kcal  1.2-1.4g/k-89gprotein  30-35ml/k-2222ml fluid    Subjective:   77 year old female with pmh HTN found to have left frontal brain mass, likely meningioma, on workup for confusion and memory loss. Patient is now s/p bifrontal craniotomy for meningioma resection (frozen: meningioma) 10/27/22. +bright red blood in stools , GI consulted. BM since occurrence with no BBB.  s/p colonoscopy in 11/15, completed showing internal hemorrhoids.    Pt seen resting in bed, daughter by pt bedside. Was NPO this morning, now on clear liquid diet. Daughter calling kitchen for tray. Pending clear liquid diet. Prior to today, pt was on regular diet, and tolerating well, had good PO intake previous days. Suspect pt to continue to have good PO intake. Pt showing no signs of n/v, no reported discomfort at this time. No reported pain. Continue to encourage PO as able. RD to follow.      Previous Nutrition Diagnosis:  Increased nutrient needs RT post-op nutrition optimization AEB s/p bifrontal craniotomy for meningioma resection   Active [ x  ]  Resolved [   ]    If resolved, new PES:     Goal: Pt to meet >75% estimated nutrition needs consistently.     Recommendations:  1. consider high  fiber diet + Ensure Enlive TID (1050kcal/60gpro)  >>align with pt's food preferences as able  2. Optimize meal time when pt awake and hungry  3. Provide assistance with meals prn  4. Monitor lytes and replete  5. RD to remain available prn     Education: N/A    Risk Level: High [   ] Moderate [  x ] Low [   ]

## 2022-11-15 NOTE — PROGRESS NOTE ADULT - SUBJECTIVE AND OBJECTIVE BOX
HPI:  77 year old female with pmh HTN who lives in [place] presents with left frontal mass, likely meningioma. Per son, meningioma was diagnosed upon CT head performed for memory loss and confusion. Son reports that confusion has been ongoing for several months. Patient admits to mild headache but denies diplopia, blurry vision, nausea, vomiting, extremity numbness or weakness and slurred speech. Patient is preop for bifrontal craniotomy for meningioma resection 10/27.   (26 Oct 2022 10:58)    INTERVAL EVENTS:  Bowel prep completed for colonoscopy today. Pt at neuro baseline.     HOSPITAL COURSE:  10/27: POD# 0 S/P bifrontal craniotomy for removal of skull base mass (frozen: meningioma.) Postop, Pt reports mild headache otherwise neurologically stable. Waxing/waning neuro exam. CTH stable, but with significant edema. Na 143, given 250cc 3% bolus.   10/28: POD1. CTH overnight stable. Oxycodone dc'd due to intermittent lethargy. He removed pending TOV. Failed TOV, straight cath prn.   10/29: POD2. Neuro stable. JPx2. Agitated overnight, precedex started transiently. SBP drop, precedex dc'd and 1L bolus given. Started on seroquel 12.5 at bedtime prn.  10/30: POD3, given additional 12.5 mg seroquel and placed on wrist restraints overnight for agitation. Wrist restraints removed. JUN # 2 removed. Metoprolol increased to 50 BID. Iron studies ordered for microcytic anemia.    10/31: POD4 BALJINDER overnight   11/1: POD5. patient agitated overnight attemtping to leave hospital, security at bedside. ordered 1:1 supervision. psych consulted, recc 10mg melatonin qd, seroquel 100mg BID and zyprexa up to 30mg/day. patient on restraints: b/l wrist/mittens, and soft vest./98 this morning hydral given, pending nutrition consult, upgraded to ICU for decreased level of arousal   11/2: POD 6 mening resection, ICU overnight, mental status improved but not fully back to post op baseline per son. CTH in AM stable, moved to SDU. Seroquel dose decreased per  reccomendations.   11/3: POD 7 meningioma resection. mental status improving.   11/4: POD 8 meningioma resection   11/5: POD 9 meningioma resection. Staples in place. BALJINDER overnight.   11/6: POD 10 resection. BALJINDER overnight.   11/7: POD 11 rsxn, BALJINDER overnight. Pt was in the bathroom brushing her teeth with her daughter with her when she suddenly felt light headed and became unresponsive, the nurse called a Rapid response and patient was brought back to the bed. Vitals were assessed at this time, FS was normal 118, pt was satting 95 on room air, BP was in the systolic 90's, 1 L normal saline was given. Pt returned to her neurologic baseline of AOx1 (self). EKG obtained, BMP/lactate/cardiac enzymes drawn. Pt remains in bed with 1 L bolus over a pressure bag. Dr. Garnica was notified and the hospitalist Dr. Cruz was at bedside. Lactate 3.5, given additional 1 L bolus. Lactate normalized to 1.2. Troponin normalized. Pt back to neurologic baseline.   11/8: POD12. BALJINDER overnight. No issues with vitals overnight, patient feels well this am. Staples removed from bicoronal crani incision, healing well.  Stroke code for change in mental status and new L facial, L weakness, workup wnl, also with new gaze preference, stroke following started on Asa 81, Decadron restarted, Briviat changed to Keppra and EEG monitoring   11/9: POD13, BALJINDER, on EEG overnight negative for seizures, 2% stopped and started on salt tabs, repeat sodium stable 140. EEG negative for seizures and d/c'ed.   11/10: POD14, BALJINDER overnight, off EEG (neg for seizure, showed some mod bifrontal slowing R>L). ASA 81mg QD. Neuro stable. MRI brain demonstrating new ischemic strokes, started on atorvastatin, echo with bubble ordered.   11/11: POD 14, BALJINDER o/n. Pending Echo. no issues overnight, intermittent headaches and more sleepy today however neuro status at baseline and moving all extremities non-focally, following commands briskly.   11/12: PD 15. BALJINDER o/n. + bright red blood in stool today. GI consulted, recommended holding SQL (ok to cont ASA), full liquid diet, trend CBCs. Fecal occult negative. BM since occurance with no BBB. UA done also negative for heme.   11/13: POD 16. BALJINDER o/n. Decreased salt tabs to 1q8. Plan for colonoscopy tomorrow. Iron studies pending.   11/14: POD 17. Unable to finish bowel prep overnight, will plan for colonoscopy tomorrow. Echo w/ bubble showed very small PFO.   11/15: POD 18, Pending colonoscopy. EP consult for loop recorder    Vital Signs Last 24 Hrs  T(C): 36.6 (15 Nov 2022 00:47), Max: 37.2 (14 Nov 2022 14:32)  T(F): 97.9 (15 Nov 2022 00:47), Max: 98.9 (14 Nov 2022 14:32)  HR: 62 (15 Nov 2022 00:47) (62 - 76)  BP: 136/73 (15 Nov 2022 00:47) (129/82 - 164/83)  BP(mean): --  RR: 18 (15 Nov 2022 00:47) (18 - 19)  SpO2: 99% (15 Nov 2022 00:47) (96% - 99%)    Parameters below as of 15 Nov 2022 00:47  Patient On (Oxygen Delivery Method): room air        I&O's Summary    13 Nov 2022 07:01  -  14 Nov 2022 07:00  --------------------------------------------------------  IN: 1620 mL / OUT: 1750 mL / NET: -130 mL    14 Nov 2022 07:01  -  15 Nov 2022 01:59  --------------------------------------------------------  IN: 0 mL / OUT: 400 mL / NET: -400 mL      PHYSICAL EXAM:  General: NAD, pt is comfortably laying in bed, A&O x 2-3 (self and place), on RA  HEENT: CN II-XII grossly intact, PERRL 3mm, EOMI b/l, face symmetric, tongue midline, neck FROM  Cardiovascular: RRR, normal S1 and S2   Respiratory: lungs CTAB, no wheezing, rhonchi, or crackles   GI: normoactive BS to auscultation, abd soft, NTND   Neuro: no aphasia, speech clear, no dysmetria, no pronator drift  strength 5/5 throughout all 4 extremities  sensation intact to light touch throughout   Extremities: distal pulses 2+ x 4   Wound/incision: bicoronal crani site C/D/I     LABS:                        9.9    8.30  )-----------( 199      ( 14 Nov 2022 06:31 )             31.4     11-14    144  |  110<H>  |  11  ----------------------------<  85  3.6   |  26  |  0.64    Ca    8.6      14 Nov 2022 06:31  Phos  3.4     11-14  Mg     1.7     11-14      PT/INR - ( 14 Nov 2022 06:31 )   PT: 15.6 sec;   INR: 1.31          PTT - ( 14 Nov 2022 06:31 )  PTT:26.7 sec        CAPILLARY BLOOD GLUCOSE          Drug Levels: [] N/A    CSF Analysis: [] N/A      Allergies    No Known Drug Allergies  strawberry (Hives)    Intolerances      MEDICATIONS:  Antibiotics:    Neuro:  acetaminophen     Tablet .. 650 milliGRAM(s) Oral every 6 hours PRN  levETIRAcetam 500 milliGRAM(s) Oral two times a day    Anticoagulation:  aspirin  chewable 81 milliGRAM(s) Oral daily    OTHER:  atorvastatin 40 milliGRAM(s) Oral at bedtime  benzocaine 15 mG/menthol 3.6 mG Lozenge 1 Lozenge Oral daily  bisacodyl 5 milliGRAM(s) Oral daily PRN  dexAMETHasone     Tablet 2 milliGRAM(s) Oral every 12 hours  metoprolol tartrate 50 milliGRAM(s) Oral two times a day  pantoprazole    Tablet 40 milliGRAM(s) Oral before breakfast  senna 2 Tablet(s) Oral at bedtime    IVF:  sodium chloride 1 Gram(s) Oral every 8 hours  sodium chloride 0.9%. 1000 milliLiter(s) IV Continuous <Continuous>    CULTURES:    RADIOLOGY & ADDITIONAL TESTS:      ASSESSMENT:  76 y/o female with h/o HTN s/p bifrontal craniotomy for meningioma resection (10/27/22). Post op course c/b delirium, new thalamic strokes on MRI, and LGIB.     Plan:  Neuro:  - Neuro checks/vital signs q4  - Pain control PRN  - keppra 500 BID 11/8  - Decadron 2q12  - CTH 11/8 (stroke code) stable but persistent edema, no new acute findings   - EEG 11/8, mod bifrontal slowing R>L, no seizures recorded, d/c'ed   - MRI brain 11/10 demonstrating thalamic ischemic strokes  - Stroke neuro following: Pend echo with bubble and possible loop recorder depending on findings     Cardiac:  - -140  - Holding home lisinopril d/t hypotension   - Increased home metoprolol to 50 mg BID 10/30  - Echo 11/10: mild LVH EF 75%, repeat echo with bubble 11/14: very small PFO  - Atorvastatin 40 mg daily for stroke core measures     Pulmonary:  - on RA, IS    GI:  - clear liquid diet -> NPO after midnight 11/14  - Bowel regimen  - PPI while on steroids  - GI consulted for bloody stool (LGIB) 11/12, plan for colonoscopy 11/15    Renal:  - Voiding intermittently, straight cath PRN   - salt tabs 1q8, continue to wean     Heme:  - SCDs, SQL held due to possible GI bleed  - LE dopplers 10/26: neg for DVT  - Microcytic anemia: f/u iron studies     Endo:  - A1C 5.1, no issues    ID:  - afebrile, trend leukocytosis  - augmentin x 10 days (10/31-11/10) per Roseline     Disposition:   - Tele, full code  - PT/OT recs AR, family updated with plan    Assessment and Plan d/w Dr. Garnica

## 2022-11-15 NOTE — PROGRESS NOTE ADULT - PROBLEM SELECTOR PLAN 3
Occurred over weekend.  No BRBPR observed today.  Hgb has remained stable  - holding DVT ppx, continue ASA  - plan was for C scope today but was not able to complete prep, will plan for first thing tomorrow AM  - bowel prep, miralax extra dose this evening, NPO midnight Occurred over weekend.  No BRBPR observed today.  Hgb has remained stable  - holding DVT ppx, continue ASA  - C scope today with internal hemorrhoids, high fiber diet  - can use PRN hydrocortisone topical or suppositories as needed

## 2022-11-16 PROBLEM — D32.9 BENIGN NEOPLASM OF MENINGES, UNSPECIFIED: Chronic | Status: ACTIVE | Noted: 2022-10-26

## 2022-11-16 PROBLEM — I10 ESSENTIAL (PRIMARY) HYPERTENSION: Chronic | Status: ACTIVE | Noted: 2022-10-26

## 2022-11-16 PROBLEM — Z86.39 PERSONAL HISTORY OF OTHER ENDOCRINE, NUTRITIONAL AND METABOLIC DISEASE: Chronic | Status: ACTIVE | Noted: 2022-10-26

## 2022-11-16 LAB
ANION GAP SERPL CALC-SCNC: 10 MMOL/L — SIGNIFICANT CHANGE UP (ref 5–17)
BUN SERPL-MCNC: 12 MG/DL — SIGNIFICANT CHANGE UP (ref 7–23)
CALCIUM SERPL-MCNC: 8.8 MG/DL — SIGNIFICANT CHANGE UP (ref 8.4–10.5)
CHLORIDE SERPL-SCNC: 108 MMOL/L — SIGNIFICANT CHANGE UP (ref 96–108)
CO2 SERPL-SCNC: 24 MMOL/L — SIGNIFICANT CHANGE UP (ref 22–31)
CREAT SERPL-MCNC: 0.74 MG/DL — SIGNIFICANT CHANGE UP (ref 0.5–1.3)
EGFR: 84 ML/MIN/1.73M2 — SIGNIFICANT CHANGE UP
GLUCOSE SERPL-MCNC: 93 MG/DL — SIGNIFICANT CHANGE UP (ref 70–99)
HCT VFR BLD CALC: 28.9 % — LOW (ref 34.5–45)
HGB BLD-MCNC: 9.4 G/DL — LOW (ref 11.5–15.5)
MAGNESIUM SERPL-MCNC: 1.7 MG/DL — SIGNIFICANT CHANGE UP (ref 1.6–2.6)
MCHC RBC-ENTMCNC: 22.9 PG — LOW (ref 27–34)
MCHC RBC-ENTMCNC: 32.5 GM/DL — SIGNIFICANT CHANGE UP (ref 32–36)
MCV RBC AUTO: 70.3 FL — LOW (ref 80–100)
NRBC # BLD: 0 /100 WBCS — SIGNIFICANT CHANGE UP (ref 0–0)
PHOSPHATE SERPL-MCNC: 4 MG/DL — SIGNIFICANT CHANGE UP (ref 2.5–4.5)
PLATELET # BLD AUTO: 204 K/UL — SIGNIFICANT CHANGE UP (ref 150–400)
POTASSIUM SERPL-MCNC: 3.5 MMOL/L — SIGNIFICANT CHANGE UP (ref 3.5–5.3)
POTASSIUM SERPL-SCNC: 3.5 MMOL/L — SIGNIFICANT CHANGE UP (ref 3.5–5.3)
RBC # BLD: 4.11 M/UL — SIGNIFICANT CHANGE UP (ref 3.8–5.2)
RBC # FLD: 17 % — HIGH (ref 10.3–14.5)
SODIUM SERPL-SCNC: 142 MMOL/L — SIGNIFICANT CHANGE UP (ref 135–145)
WBC # BLD: 7.94 K/UL — SIGNIFICANT CHANGE UP (ref 3.8–10.5)
WBC # FLD AUTO: 7.94 K/UL — SIGNIFICANT CHANGE UP (ref 3.8–10.5)

## 2022-11-16 PROCEDURE — 93970 EXTREMITY STUDY: CPT | Mod: 26

## 2022-11-16 PROCEDURE — 99024 POSTOP FOLLOW-UP VISIT: CPT

## 2022-11-16 PROCEDURE — 99233 SBSQ HOSP IP/OBS HIGH 50: CPT

## 2022-11-16 RX ORDER — POTASSIUM CHLORIDE 20 MEQ
40 PACKET (EA) ORAL ONCE
Refills: 0 | Status: COMPLETED | OUTPATIENT
Start: 2022-11-16 | End: 2022-11-16

## 2022-11-16 RX ORDER — MAGNESIUM SULFATE 500 MG/ML
2 VIAL (ML) INJECTION ONCE
Refills: 0 | Status: COMPLETED | OUTPATIENT
Start: 2022-11-16 | End: 2022-11-16

## 2022-11-16 RX ORDER — LIDOCAINE 4 G/100G
1 CREAM TOPICAL EVERY 24 HOURS
Refills: 0 | Status: DISCONTINUED | OUTPATIENT
Start: 2022-11-16 | End: 2022-11-18

## 2022-11-16 RX ADMIN — Medication 40 MILLIEQUIVALENT(S): at 13:10

## 2022-11-16 RX ADMIN — Medication 25 GRAM(S): at 13:09

## 2022-11-16 RX ADMIN — Medication 650 MILLIGRAM(S): at 20:15

## 2022-11-16 RX ADMIN — SENNA PLUS 2 TABLET(S): 8.6 TABLET ORAL at 23:06

## 2022-11-16 RX ADMIN — Medication 100 MILLIGRAM(S): at 05:36

## 2022-11-16 RX ADMIN — BENZOCAINE AND MENTHOL 1 LOZENGE: 5; 1 LIQUID ORAL at 13:09

## 2022-11-16 RX ADMIN — ATORVASTATIN CALCIUM 40 MILLIGRAM(S): 80 TABLET, FILM COATED ORAL at 23:06

## 2022-11-16 RX ADMIN — Medication 1 SUPPOSITORY(S): at 13:09

## 2022-11-16 RX ADMIN — Medication 81 MILLIGRAM(S): at 13:10

## 2022-11-16 RX ADMIN — ENOXAPARIN SODIUM 40 MILLIGRAM(S): 100 INJECTION SUBCUTANEOUS at 23:06

## 2022-11-16 RX ADMIN — Medication 650 MILLIGRAM(S): at 19:50

## 2022-11-16 NOTE — ED ADULT NURSE NOTE - SUICIDE SCREENING DEPRESSION
*ASSESSMENT: 27 y/o patient reports to the ED with dental pain on the lower right side of her face that has been persistent since the weekend. She reports that she has had multiple abscesses at the site in the past but none have reached this level of intensity. Extraoral exam: noted mild tenderness to palpation along the right angle of mandible but no swelling, asymmetry, or lymphadenopathy. Intraoral exam: noted #31 retained root tips, apical abscess, pustulant discharge appreciated through the socket, sensitivity to palpation. Patient advised to have tooth extracted and told she can come to [facility] dental as emergency tomorrow for extraction. Patient expressed understanding.       RECOMMENDATIONS:  1) Amoxicillin 500 mg and Ibuprofen 600 mg   2) Dental F/U with outpatient dentist for comprehensive dental care.   3) If any difficulty swallowing/breathing, fever occur, return to ER.     Chip Soares DMD, pager #[phone]

## 2022-11-16 NOTE — PROGRESS NOTE ADULT - PROBLEM SELECTOR PLAN 8
- Anemia with downtrending Hgb since admission of 11.2  - Stable Hb 9-10 now.   - No evidence of acute blood loss at this time, likely in the post operative state    DVT prophy: lovenox resumed  Dispo: AR pending auth (old auth , now waiting a new auth)

## 2022-11-16 NOTE — PROGRESS NOTE ADULT - SUBJECTIVE AND OBJECTIVE BOX
Patient is a 77y old  Female who presents with a chief complaint of meningioma (16 Nov 2022 01:36)      INTERVAL HPI/OVERNIGHT EVENTS:  Pt is awake but confused. When asked "where are you now?" states "I'm with you." Pt knows neurosurgeon's name but doesn't have insight of her condition. No complaints.      REVIEW OF SYSTEMS:  CONSTITUTIONAL: No fever, weight loss, or fatigue  EYES: No eye pain, visual disturbances, or discharge  ENMT:  No difficulty hearing, tinnitus, vertigo; No sinus or throat pain  NECK: No pain or stiffness  RESPIRATORY: No cough, wheezing, chills or hemoptysis; No shortness of breath  CARDIOVASCULAR: No chest pain, palpitations, dizziness, or leg swelling  GASTROINTESTINAL: No abdominal or epigastric pain. No nausea, vomiting, or hematemesis;   GENITOURINARY: No dysuria, frequency, hematuria  NEUROLOGICAL: No headaches, +memory loss, no loss of strength, numbness, or tremors  SKIN: No itching, burning, rashes, or lesions   LYMPH NODES: No enlarged glands  ENDOCRINE: No heat or cold intolerance; No hair loss  MUSCULOSKELETAL: No joint pain or swelling; No muscle, back, or extremity pain  HEME/LYMPH: No easy bruising, or bleeding gums  ALLERY AND IMMUNOLOGIC: No hives or eczema    FAMILY HISTORY:    Vital Signs Last 24 Hrs  T(C): 36.7 (16 Nov 2022 14:53), Max: 37.1 (16 Nov 2022 01:46)  T(F): 98 (16 Nov 2022 14:53), Max: 98.8 (16 Nov 2022 01:46)  HR: 79 (16 Nov 2022 14:53) (79 - 112)  BP: 109/70 (16 Nov 2022 14:53) (109/70 - 150/72)  BP(mean): --  RR: 16 (16 Nov 2022 14:53) (14 - 19)  SpO2: 97% (16 Nov 2022 14:53) (92% - 97%)    Parameters below as of 16 Nov 2022 14:53  Patient On (Oxygen Delivery Method): room air        11-15-22 @ 07:01  -  11-16-22 @ 07:00  --------------------------------------------------------  IN: 180 mL / OUT: 0 mL / NET: 180 mL    11-16-22 @ 07:01  -  11-16-22 @ 15:53  --------------------------------------------------------  IN: 240 mL / OUT: 0 mL / NET: 240 mL        PHYSICAL EXAM:  GENERAL: NAD, well-groomed, well-developed  HEAD:   Normocephalic  EYES: EOMI, conjunctiva and sclera clear  ENMT: No tonsillar erythema, exudates, or enlargement; Moist mucous membranes  NECK: Supple, No JVD, Normal thyroid  NERVOUS SYSTEM:  Alert & awake, confused, Moving all extremities    CHEST/LUNG:  No rales, rhonchi, wheezing, or rubs  HEART: Regular rate and rhythm; No murmurs, rubs, or gallops  ABDOMEN: Soft, Nontender, Nondistended; Bowel sounds present  EXTREMITIES:  2+ Peripheral Pulses, No clubbing, cyanosis, or edema  LYMPH: No lymphadenopathy noted  SKIN: No rashes or lesions    Consultant(s) Notes Reviewed:  [x ] YES  [ ] NO  Care Discussed with Consultants/Other Providers [ x] YES  [ ] NO    LABS:        RADIOLOGY & ADDITIONAL TESTS:    Imaging Personally Reviewed:  [ ] YES  [ ] NO  acetaminophen     Tablet .. 650 milliGRAM(s) Oral every 6 hours PRN  aspirin  chewable 81 milliGRAM(s) Oral daily  atorvastatin 40 milliGRAM(s) Oral at bedtime  benzocaine 15 mG/menthol 3.6 mG Lozenge 1 Lozenge Oral daily  bisacodyl 5 milliGRAM(s) Oral daily PRN  enoxaparin Injectable 40 milliGRAM(s) SubCutaneous every 24 hours  hydrocortisone hemorrhoidal Suppository 1 Suppository(s) Rectal daily  lidocaine 2% Injectable 1 Vial(s) Local Injection once  metoprolol succinate  milliGRAM(s) Oral daily  senna 2 Tablet(s) Oral at bedtime      HEALTH ISSUES - PROBLEM Dx:  Meningioma    Hypertension    Preoperative testing    Preoperative examination    Prophylactic measure    Agitation    Anemia due to acute blood loss    Hypotension    Toxic encephalopathy    Leukocytosis    CVA (cerebrovascular accident)    Bright red blood per rectum

## 2022-11-16 NOTE — PROGRESS NOTE ADULT - PROBLEM SELECTOR PLAN 3
Occurred over weekend.  No BRBPR observed today.  Hgb has remained stable  - holding DVT ppx, continue ASA  - C scope 11/15 with internal hemorrhoids, likely the source of BRBPR. c/w  high fiber diet  - can use PRN hydrocortisone topical or suppositories as needed

## 2022-11-16 NOTE — PROGRESS NOTE ADULT - SUBJECTIVE AND OBJECTIVE BOX
HPI:  77 year old female with pmh HTN who lives in [place] presents with left frontal mass, likely meningioma. Per son, meningioma was diagnosed upon CT head performed for memory loss and confusion. Son reports that confusion has been ongoing for several months. Patient admits to mild headache but denies diplopia, blurry vision, nausea, vomiting, extremity numbness or weakness and slurred speech. Patient is preop for bifrontal craniotomy for meningioma resection 10/27.   (26 Oct 2022 10:58)      Hospital course:   10/27: POD# 0 S/P bifrontal craniotomy for removal of skull base mass (frozen: meningioma.) Postop, Pt reports mild headache otherwise neurologically stable. Waxing/waning neuro exam. CTH stable, but with significant edema. Na 143, given 250cc 3% bolus.   10/28: POD1. CTH overnight stable. Oxycodone dc'd due to intermittent lethargy. He removed pending TOV. Failed TOV, straight cath prn.   10/29: POD2. Neuro stable. JPx2. Agitated overnight, precedex started transiently. SBP drop, precedex dc'd and 1L bolus given. Started on seroquel 12.5 at bedtime prn.  10/30: POD3, given additional 12.5 mg seroquel and placed on wrist restraints overnight for agitation. Wrist restraints removed. JUN # 2 removed. Metoprolol increased to 50 BID. Iron studies ordered for microcytic anemia.    10/31: POD4 BALJINDER overnight   11/1: POD5. patient agitated overnight attemtping to leave hospital, security at bedside. ordered 1:1 supervision. psych consulted, recc 10mg melatonin qd, seroquel 100mg BID and zyprexa up to 30mg/day. patient on restraints: b/l wrist/mittens, and soft vest./98 this morning hydral given, pending nutrition consult, upgraded to ICU for decreased level of arousal   11/2: POD 6 mening resection, ICU overnight, mental status improved but not fully back to post op baseline per son. CTH in AM stable, moved to SDU. Seroquel dose decreased per  reccomendations.   11/3: POD 7 meningioma resection. mental status improving.   11/4: POD 8 meningioma resection   11/5: POD 9 meningioma resection. Staples in place. BALJINDER overnight.   11/6: POD 10 resection. BALJINDER overnight.   11/7: POD 11 rsxn, BALJINDER overnight. Pt was in the bathroom brushing her teeth with her daughter with her when she suddenly felt light headed and became unresponsive, the nurse called a Rapid response and patient was brought back to the bed. Vitals were assessed at this time, FS was normal 118, pt was satting 95 on room air, BP was in the systolic 90's, 1 L normal saline was given. Pt returned to her neurologic baseline of AOx1 (self). EKG obtained, BMP/lactate/cardiac enzymes drawn. Pt remains in bed with 1 L bolus over a pressure bag. Dr. Garnica was notified and the hospitalist Dr. Cruz was at bedside. Lactate 3.5, given additional 1 L bolus. Lactate normalized to 1.2. Troponin normalized. Pt back to neurologic baseline.   11/8: POD12. BALJINDER overnight. No issues with vitals overnight, patient feels well this am. Staples removed from bicoronal crani incision, healing well.  Stroke code for change in mental status and new L facial, L weakness, workup wnl, also with new gaze preference, stroke following started on Asa 81, Decadron restarted, Briviat changed to Keppra and EEG monitoring   11/9: POD13, BALJINDER, on EEG overnight negative for seizures, 2% stopped and started on salt tabs, repeat sodium stable 140. EEG negative for seizures and d/c'ed.   11/10: POD14, BALJINDER overnight, off EEG (neg for seizure, showed some mod bifrontal slowing R>L). ASA 81mg QD. Neuro stable. MRI brain demonstrating new ischemic strokes, started on atorvastatin, echo with bubble ordered.   11/11: POD 14, BALJINDER o/n. Pending Echo. no issues overnight, intermittent headaches and more sleepy today however neuro status at baseline and moving all extremities non-focally, following commands briskly.   11/12: PD 15. BALJINDER o/n. + bright red blood in stool today. GI consulted, recommended holding SQL (ok to cont ASA), full liquid diet, trend CBCs. Fecal occult negative. BM since occurance with no BBB. UA done also negative for heme.   11/13: POD 16. BALJINDER o/n. Decreased salt tabs to 1q8. Plan for colonoscopy tomorrow. Iron studies pending.   11/14: POD 17. Unable to finish bowel prep overnight, will plan for colonoscopy tomorrow. Echo w/ bubble showed very small PFO.   11/15: POD 18, Colonoscopy complete showing internal hemorrhoids. EP consult for loop recorder. Restarted SQL. discontinued decadron and keppra as per FIORDALIZA  11/16: POD 19, BALJINDER overnight. s/p colonoscopy and loop recorder yesterday. Pending dispo       Vital Signs Last 24 Hrs  T(C): 36 (15 Nov 2022 21:19), Max: 37.1 (15 Nov 2022 05:18)  T(F): 96.8 (15 Nov 2022 21:19), Max: 98.8 (15 Nov 2022 10:43)  HR: 112 (15 Nov 2022 21:19) (66 - 112)  BP: 127/63 (15 Nov 2022 21:19) (122/74 - 164/79)  BP(mean): 102 (15 Nov 2022 07:22) (102 - 102)  RR: 19 (15 Nov 2022 21:19) (15 - 19)  SpO2: 96% (15 Nov 2022 21:19) (96% - 99%)    Parameters below as of 15 Nov 2022 21:19  Patient On (Oxygen Delivery Method): room air        I&O's Summary    14 Nov 2022 07:01  -  15 Nov 2022 07:00  --------------------------------------------------------  IN: 900 mL / OUT: 500 mL / NET: 400 mL    15 Nov 2022 07:01  -  16 Nov 2022 01:37  --------------------------------------------------------  IN: 180 mL / OUT: 0 mL / NET: 180 mL        PHYSICAL EXAM:  General: NAD, pt is comfortably laying in bed, A&O x 2-3 (self and place), on RA  HEENT: CN II-XII grossly intact, PERRL 3mm, EOMI b/l, face symmetric, tongue midline, neck FROM  Cardiovascular: RRR, normal S1 and S2   Respiratory: lungs CTAB, no wheezing, rhonchi, or crackles   GI: normoactive BS to auscultation, abd soft, NTND   Neuro: no aphasia, speech clear, no dysmetria, no pronator drift  strength 5/5 throughout all 4 extremities  sensation intact to light touch throughout   Extremities: distal pulses 2+ x 4   Wound/incision: bicoronal crani site C/D/I       TUBES/LINES:  [] He  [] A-line  [] Lumbar Drain  [] Wound Drains  [] NGT   [] EVD   [] CVC  [] Other      DIET:  [] NPO  [x] Mechanical  [] Tube feeds    LABS:                        10.4   7.85  )-----------( 193      ( 15 Nov 2022 05:30 )             32.3     11-15    145  |  109<H>  |  8   ----------------------------<  83  3.6   |  24  |  0.69    Ca    8.8      15 Nov 2022 05:30  Phos  3.4     11-15  Mg     1.6     11-15      PT/INR - ( 14 Nov 2022 06:31 )   PT: 15.6 sec;   INR: 1.31          PTT - ( 14 Nov 2022 06:31 )  PTT:26.7 sec        CAPILLARY BLOOD GLUCOSE          Drug Levels: [] N/A    CSF Analysis: [] N/A      Allergies    No Known Drug Allergies  strawberry (Hives)    Intolerances        Home Medications:  acetaminophen 325 mg oral tablet: 2 tab(s) orally every 6 hours, As needed, Mild Pain (1 - 3) (15 Nov 2022 14:01)  aspirin 81 mg oral tablet, chewable: 1 tab(s) orally once a day (15 Nov 2022 14:04)  atorvastatin 40 mg oral tablet: 1 tab(s) orally once a day (at bedtime) (15 Nov 2022 14:04)  enoxaparin: 40 milligram(s) subcutaneous once a day (at bedtime) (15 Nov 2022 14:01)  metoprolol succinate 25 mg oral tablet, extended release: 1 tab(s) orally 2 times a day (26 Oct 2022 14:35)  senna leaf extract oral tablet: 2 tab(s) orally once a day (at bedtime) (15 Nov 2022 14:04)      MEDICATIONS:  MEDICATIONS  (STANDING):  aspirin  chewable 81 milliGRAM(s) Oral daily  atorvastatin 40 milliGRAM(s) Oral at bedtime  benzocaine 15 mG/menthol 3.6 mG Lozenge 1 Lozenge Oral daily  enoxaparin Injectable 40 milliGRAM(s) SubCutaneous every 24 hours  hydrocortisone hemorrhoidal Suppository 1 Suppository(s) Rectal daily  lidocaine 2% Injectable 1 Vial(s) Local Injection once  metoprolol succinate  milliGRAM(s) Oral daily  senna 2 Tablet(s) Oral at bedtime    MEDICATIONS  (PRN):  acetaminophen     Tablet .. 650 milliGRAM(s) Oral every 6 hours PRN Mild Pain (1 - 3)  bisacodyl 5 milliGRAM(s) Oral daily PRN Constipation      CULTURES:      RADIOLOGY & ADDITIONAL TESTS:      ASSESSMENT:  78 y/o female with h/o HTN s/p bifrontal craniotomy for meningioma resection (10/27/22). Post op course c/b delirium, new thalamic strokes on MRI, and LGIB.     Plan:  Neuro:  - Neuro checks/vital signs q4  - Pain control PRN  - off Keppra and decadron   - [facility] 11/8 (stroke code) stable but persistent edema, no new acute findings   - EEG 11/8, mod bifrontal slowing R>L, no seizures recorded, d/c'ed   - MRI brain 11/10 demonstrating thalamic ischemic strokes  - Stroke neuro following: Pend echo with bubble and possible loop recorder depending on findings     Cardiac:  - -140  - Holding home lisinopril d/t hypotension   - switched metoprolol to Toprol 100mg  - Echo 11/10: mild LVH EF 75%, repeat echo with bubble 11/14: very small PFO  - Atorvastatin 40 mg daily for stroke core measures     Pulmonary:  - on RA, IS    GI:  - clear liquid diet -> NPO after midnight 11/14  - Bowel regimen  - PPI while on steroids  - GI consulted for bloody stool (LGIB) 11/12, plan for colonoscopy 11/15    Renal:  - Voiding intermittently, straight cath PRN   - salt tabs 1q8, continue to wean     Heme:  - SCDs, SQL held due to possible GI bleed  - LE dopplers 10/26: neg for DVT  - Microcytic anemia: f/u iron studies     Endo:  - A1C 5.1, no issues    ID:  - afebrile, trend leukocytosis  - augmentin x 10 days (10/31-11/10) per Roseline     Disposition:   - Tele, full code  - PT/OT recs AR, family updated with plan    Assessment and Plan d/w Dr. Garnica       Assessment: present when checked     [] GCS   E   V   M     Heart Failure: [] Acute, [] acute on chronic, [] chronic   Heart Failure: [] Diastolic (HFpEF), [] Systolic (HRrEF), [] Combined (HFpEF and HFrEF), [] RHF, [] Pulm HTN, [] Other     [] OZZIE, [] ATN, [] AIN, [] other   [] CKD1, [] CKD2, [] CKD3, [] CKD4, [] CKD5, [] ESRD     Encephalopathy: [] Metabolic, [] Hepatic, [] Toxic, [] Neurological, [] Other     Abnormal Nutritional Status: [] malnutrition (see nutrition note), []underweight: BMI <19, [] morbid obesity: BMI >40, [] Cachexia     [] Sepsis   [] Hypovolemic shock, [] Cardiogenic shock, [] Hemorrhagic shock, [] Neurogenic shock   [] Acute respiratory failure   [] Cerebral edema, [] Brain compression / herniation   [] Functional quadriplegia   [] Acute blood loss anemia

## 2022-11-16 NOTE — PROGRESS NOTE ADULT - PROBLEM SELECTOR PLAN 1
- s/p resection 10/27  - Course complicated by agitation, now improved  - seizure prophylaxis per primary team  - Management per primary team  - PT recommends Acute rehab, awaiting new auth

## 2022-11-16 NOTE — PROGRESS NOTE ADULT - PROBLEM SELECTOR PLAN 6
Lopressor converted to Toprol  - holding lisinopril, can restart if remains hypertensive but would continue to hold in setting of possible GI bleed. BP labile, continue to hold for now.  - encouraging good PO intake

## 2022-11-16 NOTE — PROGRESS NOTE ADULT - PROBLEM SELECTOR PLAN 2
New thalamic infarct on MRI 11/10  - c/w ASA, atorvastatin  - TTE wnl, small PFO on bubble study  - stroke team following  - s/p loop recorder 11/15  - f/u Doppler LE to r/o DVT in setting of PFO

## 2022-11-17 LAB
ANION GAP SERPL CALC-SCNC: 6 MMOL/L — SIGNIFICANT CHANGE UP (ref 5–17)
BUN SERPL-MCNC: 12 MG/DL — SIGNIFICANT CHANGE UP (ref 7–23)
CALCIUM SERPL-MCNC: 9.1 MG/DL — SIGNIFICANT CHANGE UP (ref 8.4–10.5)
CHLORIDE SERPL-SCNC: 110 MMOL/L — HIGH (ref 96–108)
CO2 SERPL-SCNC: 27 MMOL/L — SIGNIFICANT CHANGE UP (ref 22–31)
CREAT SERPL-MCNC: 0.67 MG/DL — SIGNIFICANT CHANGE UP (ref 0.5–1.3)
EGFR: 91 ML/MIN/1.73M2 — SIGNIFICANT CHANGE UP
GLUCOSE SERPL-MCNC: 88 MG/DL — SIGNIFICANT CHANGE UP (ref 70–99)
HCT VFR BLD CALC: 31 % — LOW (ref 34.5–45)
HGB BLD-MCNC: 9.8 G/DL — LOW (ref 11.5–15.5)
MAGNESIUM SERPL-MCNC: 1.8 MG/DL — SIGNIFICANT CHANGE UP (ref 1.6–2.6)
MCHC RBC-ENTMCNC: 23 PG — LOW (ref 27–34)
MCHC RBC-ENTMCNC: 31.6 GM/DL — LOW (ref 32–36)
MCV RBC AUTO: 72.8 FL — LOW (ref 80–100)
NRBC # BLD: 0 /100 WBCS — SIGNIFICANT CHANGE UP (ref 0–0)
PHOSPHATE SERPL-MCNC: 3.5 MG/DL — SIGNIFICANT CHANGE UP (ref 2.5–4.5)
PLATELET # BLD AUTO: 203 K/UL — SIGNIFICANT CHANGE UP (ref 150–400)
POTASSIUM SERPL-MCNC: 3.8 MMOL/L — SIGNIFICANT CHANGE UP (ref 3.5–5.3)
POTASSIUM SERPL-SCNC: 3.8 MMOL/L — SIGNIFICANT CHANGE UP (ref 3.5–5.3)
RBC # BLD: 4.26 M/UL — SIGNIFICANT CHANGE UP (ref 3.8–5.2)
RBC # FLD: 17.2 % — HIGH (ref 10.3–14.5)
SARS-COV-2 RNA SPEC QL NAA+PROBE: SIGNIFICANT CHANGE UP
SODIUM SERPL-SCNC: 143 MMOL/L — SIGNIFICANT CHANGE UP (ref 135–145)
WBC # BLD: 7.08 K/UL — SIGNIFICANT CHANGE UP (ref 3.8–10.5)
WBC # FLD AUTO: 7.08 K/UL — SIGNIFICANT CHANGE UP (ref 3.8–10.5)

## 2022-11-17 PROCEDURE — 99232 SBSQ HOSP IP/OBS MODERATE 35: CPT

## 2022-11-17 PROCEDURE — 99024 POSTOP FOLLOW-UP VISIT: CPT

## 2022-11-17 RX ORDER — MAGNESIUM SULFATE 500 MG/ML
2 VIAL (ML) INJECTION ONCE
Refills: 0 | Status: COMPLETED | OUTPATIENT
Start: 2022-11-17 | End: 2022-11-17

## 2022-11-17 RX ORDER — POTASSIUM CHLORIDE 20 MEQ
20 PACKET (EA) ORAL ONCE
Refills: 0 | Status: COMPLETED | OUTPATIENT
Start: 2022-11-17 | End: 2022-11-17

## 2022-11-17 RX ADMIN — ATORVASTATIN CALCIUM 40 MILLIGRAM(S): 80 TABLET, FILM COATED ORAL at 22:04

## 2022-11-17 RX ADMIN — LIDOCAINE 1 PATCH: 4 CREAM TOPICAL at 12:33

## 2022-11-17 RX ADMIN — Medication 100 MILLIGRAM(S): at 06:56

## 2022-11-17 RX ADMIN — SENNA PLUS 2 TABLET(S): 8.6 TABLET ORAL at 22:04

## 2022-11-17 RX ADMIN — LIDOCAINE 1 PATCH: 4 CREAM TOPICAL at 19:36

## 2022-11-17 RX ADMIN — LIDOCAINE 1 PATCH: 4 CREAM TOPICAL at 23:37

## 2022-11-17 RX ADMIN — Medication 25 GRAM(S): at 13:34

## 2022-11-17 RX ADMIN — Medication 81 MILLIGRAM(S): at 12:33

## 2022-11-17 RX ADMIN — ENOXAPARIN SODIUM 40 MILLIGRAM(S): 100 INJECTION SUBCUTANEOUS at 22:05

## 2022-11-17 RX ADMIN — BENZOCAINE AND MENTHOL 1 LOZENGE: 5; 1 LIQUID ORAL at 12:33

## 2022-11-17 RX ADMIN — Medication 20 MILLIEQUIVALENT(S): at 13:35

## 2022-11-17 NOTE — PROGRESS NOTE ADULT - SUBJECTIVE AND OBJECTIVE BOX
HPI:  77 year old female with pmh HTN who lives in [place] presents with left frontal mass, likely meningioma. Per son, meningioma was diagnosed upon CT head performed for memory loss and confusion. Son reports that confusion has been ongoing for several months. Patient admits to mild headache but denies diplopia, blurry vision, nausea, vomiting, extremity numbness or weakness and slurred speech. Patient is preop for bifrontal craniotomy for meningioma resection 10/27.   (26 Oct 2022 10:58)    OVERNIGHT EVENTS: BALJINDER overnight, neuro stable. Complaining of right knee/leg pain, had negative dopplers 11/16 ordered for lidocaine patch.    HOSPITAL COURSE  10/27: POD# 0 S/P bifrontal craniotomy for removal of skull base mass (frozen: meningioma.) Postop, Pt reports mild headache otherwise neurologically stable. Waxing/waning neuro exam. CTH stable, but with significant edema. Na 143, given 250cc 3% bolus.   10/28: POD1. CTH overnight stable. Oxycodone dc'd due to intermittent lethargy. He removed pending TOV. Failed TOV, straight cath prn.   10/29: POD2. Neuro stable. JPx2. Agitated overnight, precedex started transiently. SBP drop, precedex dc'd and 1L bolus given. Started on seroquel 12.5 at bedtime prn.  10/30: POD3, given additional 12.5 mg seroquel and placed on wrist restraints overnight for agitation. Wrist restraints removed. JUN # 2 removed. Metoprolol increased to 50 BID. Iron studies ordered for microcytic anemia.    10/31: POD4 BALJINDER overnight   11/1: POD5. patient agitated overnight attemtping to leave hospital, security at bedside. ordered 1:1 supervision. psych consulted, recc 10mg melatonin qd, seroquel 100mg BID and zyprexa up to 30mg/day. patient on restraints: b/l wrist/mittens, and soft vest./98 this morning hydral given, pending nutrition consult, upgraded to ICU for decreased level of arousal   11/2: POD 6 mening resection, ICU overnight, mental status improved but not fully back to post op baseline per son. CTH in AM stable, moved to SDU. Seroquel dose decreased per  reccomendations.   11/3: POD 7 meningioma resection. mental status improving.   11/4: POD 8 meningioma resection   11/5: POD 9 meningioma resection. Staples in place. BALJINDER overnight.   11/6: POD 10 resection. BALJINDER overnight.   11/7: POD 11 rsxn, BALJINDER overnight. Pt was in the bathroom brushing her teeth with her daughter with her when she suddenly felt light headed and became unresponsive, the nurse called a Rapid response and patient was brought back to the bed. Vitals were assessed at this time, FS was normal 118, pt was satting 95 on room air, BP was in the systolic 90's, 1 L normal saline was given. Pt returned to her neurologic baseline of AOx1 (self). EKG obtained, BMP/lactate/cardiac enzymes drawn. Pt remains in bed with 1 L bolus over a pressure bag. Dr. Garnica was notified and the hospitalist Dr. Cruz was at bedside. Lactate 3.5, given additional 1 L bolus. Lactate normalized to 1.2. Troponin normalized. Pt back to neurologic baseline.   11/8: POD12. BALJINDER overnight. No issues with vitals overnight, patient feels well this am. Staples removed from bicoronal crani incision, healing well.  Stroke code for change in mental status and new L facial, L weakness, workup wnl, also with new gaze preference, stroke following started on Asa 81, Decadron restarted, Briviat changed to Keppra and EEG monitoring   11/9: POD13, BALJINDER, on EEG overnight negative for seizures, 2% stopped and started on salt tabs, repeat sodium stable 140. EEG negative for seizures and d/c'ed.   11/10: POD14, BALJINDER overnight, off EEG (neg for seizure, showed some mod bifrontal slowing R>L). ASA 81mg QD. Neuro stable. MRI brain demonstrating new ischemic strokes, started on atorvastatin, echo with bubble ordered.   11/11: POD 14, BALJINDER o/n. Pending Echo. no issues overnight, intermittent headaches and more sleepy today however neuro status at baseline and moving all extremities non-focally, following commands briskly.   11/12: PD 15. BALJINDER o/n. + bright red blood in stool today. GI consulted, recommended holding SQL (ok to cont ASA), full liquid diet, trend CBCs. Fecal occult negative. BM since occurance with no BBB. UA done also negative for heme.   11/13: POD 16. BALJINDER o/n. Decreased salt tabs to 1q8. Plan for colonoscopy tomorrow. Iron studies pending.   11/14: POD 17. Unable to finish bowel prep overnight, will plan for colonoscopy tomorrow. Echo w/ bubble showed very small PFO.   11/15: POD 18, Colonoscopy complete showing internal hemorrhoids. EP consult for loop recorder. Restarted SQL. discontinued decadron and keppra as per FIORDALIZA. Salt tabs d/c'ed.  11/16: POD 19, BALJINDER overnight. s/p colonoscopy and loop recorder yesterday. Had bowel movement, lisinopril d/c'ed and started on Toprol XL. Lidocaine patch ordered for right knee pain, negative dopplers. Pending AR  11/17: POD20, BALJINDER overnight.       Vital Signs Last 24 Hrs  T(C): 37 (16 Nov 2022 21:19), Max: 37.1 (16 Nov 2022 01:46)  T(F): 98.6 (16 Nov 2022 21:19), Max: 98.8 (16 Nov 2022 01:46)  HR: 73 (16 Nov 2022 21:19) (73 - 90)  BP: 133/89 (16 Nov 2022 21:19) (109/70 - 150/72)  BP(mean): --  RR: 16 (16 Nov 2022 21:19) (14 - 16)  SpO2: 96% (16 Nov 2022 21:19) (92% - 97%)    Parameters below as of 16 Nov 2022 21:19  Patient On (Oxygen Delivery Method): room air        I&O's Summary    15 Nov 2022 07:01  -  16 Nov 2022 07:00  --------------------------------------------------------  IN: 180 mL / OUT: 0 mL / NET: 180 mL    16 Nov 2022 07:01  -  17 Nov 2022 00:45  --------------------------------------------------------  IN: 240 mL / OUT: 0 mL / NET: 240 mL        PHYSICAL EXAM:  General: NAD, pt is comfortably sitting up in bed, on room air  HEENT: CN II-XII grossly intact, PERRL 3mm briskly reactive, EOMI b/l, face symmetric, tongue midline, neck FROM  Cardiovascular: RRR, normal S1 and S2   Respiratory: lungs CTAB, no wheezing, rhonchi, or crackles   GI: normoactive BS to auscultation, abd soft, NTND   Neuro: A&Ox1-2, No aphasia, speech clear, no dysmetria, no pronator drift. Follows commands.  SLADE x4 spontaneously, 5/5 strength in all extremities throughout. SILT throughout   Extremities: warm and well perfused   Wound/incision: +bicoronal incision, healing well C/D/I      TUBES/LINES:  [] He  [] Lumbar Drain  [] Wound Drains  [] Others      DIET:  [] NPO  [x] Mechanical  [] Tube feeds    LABS:                        9.4    7.94  )-----------( 204      ( 16 Nov 2022 05:30 )             28.9     11-16    142  |  108  |  12  ----------------------------<  93  3.5   |  24  |  0.74    Ca    8.8      16 Nov 2022 05:30  Phos  4.0     11-16  Mg     1.7     11-16              CAPILLARY BLOOD GLUCOSE          Drug Levels: [] N/A    CSF Analysis: [] N/A      Allergies    No Known Drug Allergies  strawberry (Hives)    Intolerances      MEDICATIONS:  Antibiotics:    Neuro:  acetaminophen     Tablet .. 650 milliGRAM(s) Oral every 6 hours PRN    Anticoagulation:  aspirin  chewable 81 milliGRAM(s) Oral daily  enoxaparin Injectable 40 milliGRAM(s) SubCutaneous every 24 hours    OTHER:  atorvastatin 40 milliGRAM(s) Oral at bedtime  benzocaine 15 mG/menthol 3.6 mG Lozenge 1 Lozenge Oral daily  bisacodyl 5 milliGRAM(s) Oral daily PRN  hydrocortisone hemorrhoidal Suppository 1 Suppository(s) Rectal daily  lidocaine   4% Patch 1 Patch Transdermal every 24 hours  lidocaine 2% Injectable 1 Vial(s) Local Injection once  metoprolol succinate  milliGRAM(s) Oral daily  senna 2 Tablet(s) Oral at bedtime    IVF:    CULTURES:    RADIOLOGY & ADDITIONAL TESTS:  < from: US Duplex Venous Lower Ext Complete, Bilateral (11.16.22 @ 09:35) >  FINDINGS:    RIGHT:  Normal compressibility of the RIGHT common femoral, femoral and popliteal   veins.  Doppler examination shows normal spontaneous and phasic flow.  No RIGHT calf vein thrombosis is detected.    LEFT:  Normal compressibility of the LEFT common femoral, femoral and popliteal   veins.  Doppler examination shows normal spontaneous and phasic flow.  No LEFT calf vein thrombosis is detected.    IMPRESSION:  No evidence of deep venous thrombosis in either lower extremity.    < end of copied text >    < from: Echocardiogram w/ Bubble and Doppler (11.14.22 @ 11:31) >  -----  CONCLUSIONS:     1. 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    < end of copied text >      ASSESSMENT:  78 y/o female with h/o HTN s/p bifrontal craniotomy for meningioma resection (10/27/22). Post op course c/b delirium, new thalamic strokes on MRI, and LGIB. Now found to have small PFO and s/p loop recorder placement 11/15/22.       HEADACHE    Handoff    MEWS Score    Meningioma    Hypertension    H/O hyperthyroidism    Brain tumor    Brain tumor    Metabolic encephalopathy    Craniotomy for meningioma    Tension type headache    Meningioma    Meningioma    Hypertension    Preoperative testing    Preoperative examination    Prophylactic measure    Agitation    Anemia due to acute blood loss    Hypotension    Toxic encephalopathy    Leukocytosis    CVA (cerebrovascular accident)    Bright red blood per rectum    Craniotomy for meningioma    Status post thyroid surgery    HEADACHE    Hypertension    Room Service Assist    Hypertension    Agitation    Anemia due to acute blood loss    Leukocytosis    CVA (cerebrovascular accident)    [email or web address]        PLAN:  Neuro:  - Neuro checks/vital signs q4  - Pain control PRN  - off Keppra and decadron   - CTH 11/8 (stroke code) stable but persistent edema, no new acute findings   - EEG 11/8, mod bifrontal slowing R>L, no seizures recorded, d/c'ed   - MRI brain 11/10 demonstrating thalamic ischemic strokes  - Stroke neuro following: Pend echo with bubble and possible loop recorder depending on findings     Cardiac:  - -140  - Holding home lisinopril d/t hypotension   - switched metoprolol to Toprol 100mg  - Echo 11/10: mild LVH EF 75%, repeat echo with bubble 11/14: very small PFO  - Atorvastatin 40 mg daily for stroke core measures   - s/p loop recorder w/ EP 11/15    Pulmonary:  - on RA, IS    GI:  - Regular diet  - Last BM 11/16  - Bowel regimen  - PPI while on steroids  - GI consulted for bloody stool (LGIB), s/p colonoscopy 11/15 showing internal hemorrhoids    Renal:  - Voiding intermittently, straight cath PRN   - salt tabs d/c'ed 11/15     Heme:  - SCDs, SQL   - LE dopplers 11/16: negative for DVT  - Microcytic anemia: iron studies normal     Endo:  - A1C 5.1, no issues    ID:  - afebrile, trend leukocytosis  - augmentin x 10 days (10/31-11/10) per Roseline     Disposition:   - regional status, full code  - PT/OT recs AR, family updated with plan    Assessment and Plan d/w Dr. Garnica     Assessment:  Present when checked    []  GCS  E   V  M     Heart Failure: []Acute, [] acute on chronic , []chronic  Heart Failure:  [] Diastolic (HFpEF), [] Systolic (HFrEF), []Combined (HFpEF and HFrEF), [] RHF, [] Pulm HTN, [] Other    [] OZZIE, [] ATN, [] AIN, [] other  [] CKD1, [] CKD2, [] CKD 3, [] CKD 4, [] CKD 5, []ESRD    Encephalopathy: [] Metabolic, [] Hepatic, [] toxic, [] Neurological, [] Other    Abnormal Nurtitional Status: [] malnurtition (see nutrition note), [ ]underweight: BMI < 19, [] morbid obesity: BMI >40, [] Cachexia    [] Sepsis  [] hypovolemic shock,[] cardiogenic shock, [] hemorrhagic shock, [] neuogenic shock  [] Acute Respiratory Failure  []Cerebral edema, [] Brain compression/ herniation,   [] Functional quadriplegia  [] Acute blood loss anemia

## 2022-11-17 NOTE — PROGRESS NOTE ADULT - PROBLEM SELECTOR PLAN 3
Occurred over weekend.  No further BRBPR, Hgb has remained stable  - holding DVT ppx, continue ASA  - C scope 11/15 with internal hemorrhoids, likely the source of BRBPR. c/w  high fiber diet  - can use PRN hydrocortisone topical or suppositories as needed

## 2022-11-17 NOTE — PROGRESS NOTE ADULT - PROBLEM SELECTOR PLAN 2
New thalamic infarct on MRI 11/10  - c/w ASA, atorvastatin  - TTE wnl, small PFO on bubble study  - stroke team following  - s/p loop recorder 11/15  - LE dopplers negative

## 2022-11-17 NOTE — CHART NOTE - NSCHARTNOTESELECT_GEN_ALL_CORE
11/10/22/Event Note
3 hour letter/Event Note
Event Note
Follow up/Nutrition Services
Stroke Code/Event Note
11/11/22/Event Note
11/9/22/Event Note
Event Note
Follow Up/Nutrition Services
Follow up/Nutrition Services
GI - Colonoscopy
MRI results/Event Note
Rapid Response

## 2022-11-17 NOTE — PROGRESS NOTE ADULT - SUBJECTIVE AND OBJECTIVE BOX
SUBJECTIVE:  Patient seen and examined at bedside.  Sitting up comfortably in bed, no complaints.  Ate 3 pancakes for breakfast this AM    ROS:  Denies fevers, chills, headache, vision changes, neck pain, cough, SOB, chest pain, Abdominal pain, N/V, dysuria or new rash.  All other ROS negative except as above    Vital Signs Last 12 Hrs  T(F): 98.2 (11-17-22 @ 05:22), Max: 98.2 (11-17-22 @ 05:22)  HR: 67 (11-17-22 @ 05:22) (67 - 67)  BP: 130/88 (11-17-22 @ 05:22) (130/88 - 130/88)  BP(mean): --  RR: 15 (11-17-22 @ 05:22) (15 - 15)  SpO2: 97% (11-17-22 @ 05:22) (97% - 97%)  I&O's Summary    16 Nov 2022 07:01  -  17 Nov 2022 07:00  --------------------------------------------------------  IN: 240 mL / OUT: 800 mL / NET: -560 mL        PHYSICAL EXAM:  Constitutional: NAD, comfortable in bed.  HEENT: PERRLA, EOMI, MMM, crani incision c/d/i  Neck: Supple  Respiratory: CTA B/L. No w/r/r.   Cardiovascular: RRR, normal S1 and S2, no m/r/g. loop recorder site c/d/i  Gastrointestinal: +BS, soft NTND, no guarding or rebound tenderness, no palpable masses   Extremities: wwp; no cyanosis, clubbing or edema.   Vascular: Pulses equal and strong throughout.   Neurological: AAOx1-2 to self and knows it is a hospital, tangential, strength and sensation intact throughout        LABS:                        9.8    7.08  )-----------( 203      ( 17 Nov 2022 08:42 )             31.0     11-17    143  |  110<H>  |  12  ----------------------------<  88  3.8   |  27  |  0.67    Ca    9.1      17 Nov 2022 08:42  Phos  3.5     11-17  Mg     1.8     11-17              RADIOLOGY & ADDITIONAL TESTS:  No new imaging    MEDICATIONS  (STANDING):  aspirin  chewable 81 milliGRAM(s) Oral daily  atorvastatin 40 milliGRAM(s) Oral at bedtime  benzocaine 15 mG/menthol 3.6 mG Lozenge 1 Lozenge Oral daily  enoxaparin Injectable 40 milliGRAM(s) SubCutaneous every 24 hours  hydrocortisone hemorrhoidal Suppository 1 Suppository(s) Rectal daily  lidocaine   4% Patch 1 Patch Transdermal every 24 hours  lidocaine 2% Injectable 1 Vial(s) Local Injection once  magnesium sulfate  IVPB 2 Gram(s) IV Intermittent once  metoprolol succinate  milliGRAM(s) Oral daily  potassium chloride    Tablet ER 20 milliEquivalent(s) Oral once  senna 2 Tablet(s) Oral at bedtime    MEDICATIONS  (PRN):  acetaminophen     Tablet .. 650 milliGRAM(s) Oral every 6 hours PRN Mild Pain (1 - 3)  bisacodyl 5 milliGRAM(s) Oral daily PRN Constipation

## 2022-11-17 NOTE — PROGRESS NOTE ADULT - ASSESSMENT
78 yo F hx of HTN, Hyperthyroidism who presented w/ likely L frontal meningioma and is s/p bifrontal craniotomy with meningioma resection 10/27. Course complicated by CVA and rectal bleeding due to hemorrhoids

## 2022-11-17 NOTE — CHART NOTE - NSCHARTNOTEFT_GEN_A_CORE
POD20, BALJINDER overnight. , neuro & hemodynamically stable, hgb stable 9.8, doing well this AM,  lidocaine patch helping for R knee pain, pending LUKAS POD20, BALJINDER overnight. , neuro & hemodynamically stable, hgb stable 9.8, doing well this AM,  lidocaine patch helping for R knee pain, pending bed at [facility], has auth

## 2022-11-18 ENCOUNTER — TRANSCRIPTION ENCOUNTER (OUTPATIENT)
Age: 77
End: 2022-11-18

## 2022-11-18 VITALS
HEART RATE: 80 BPM | SYSTOLIC BLOOD PRESSURE: 114 MMHG | DIASTOLIC BLOOD PRESSURE: 77 MMHG | RESPIRATION RATE: 16 BRPM | TEMPERATURE: 98 F | OXYGEN SATURATION: 96 %

## 2022-11-18 PROCEDURE — 97112 NEUROMUSCULAR REEDUCATION: CPT

## 2022-11-18 PROCEDURE — 84132 ASSAY OF SERUM POTASSIUM: CPT

## 2022-11-18 PROCEDURE — C1764: CPT

## 2022-11-18 PROCEDURE — 93970 EXTREMITY STUDY: CPT

## 2022-11-18 PROCEDURE — 83036 HEMOGLOBIN GLYCOSYLATED A1C: CPT

## 2022-11-18 PROCEDURE — 84100 ASSAY OF PHOSPHORUS: CPT

## 2022-11-18 PROCEDURE — C1713: CPT

## 2022-11-18 PROCEDURE — 70496 CT ANGIOGRAPHY HEAD: CPT

## 2022-11-18 PROCEDURE — 86850 RBC ANTIBODY SCREEN: CPT

## 2022-11-18 PROCEDURE — 99232 SBSQ HOSP IP/OBS MODERATE 35: CPT

## 2022-11-18 PROCEDURE — 85027 COMPLETE CBC AUTOMATED: CPT

## 2022-11-18 PROCEDURE — 85018 HEMOGLOBIN: CPT

## 2022-11-18 PROCEDURE — U0005: CPT

## 2022-11-18 PROCEDURE — 88341 IMHCHEM/IMCYTCHM EA ADD ANTB: CPT

## 2022-11-18 PROCEDURE — 85025 COMPLETE CBC W/AUTO DIFF WBC: CPT

## 2022-11-18 PROCEDURE — 97161 PT EVAL LOW COMPLEX 20 MIN: CPT

## 2022-11-18 PROCEDURE — 97110 THERAPEUTIC EXERCISES: CPT

## 2022-11-18 PROCEDURE — 93005 ELECTROCARDIOGRAM TRACING: CPT

## 2022-11-18 PROCEDURE — 97116 GAIT TRAINING THERAPY: CPT

## 2022-11-18 PROCEDURE — 95716 VEEG EA 12-26HR CONT MNTR: CPT

## 2022-11-18 PROCEDURE — 99285 EMERGENCY DEPT VISIT HI MDM: CPT

## 2022-11-18 PROCEDURE — 80048 BASIC METABOLIC PNL TOTAL CA: CPT

## 2022-11-18 PROCEDURE — 70498 CT ANGIOGRAPHY NECK: CPT

## 2022-11-18 PROCEDURE — 83540 ASSAY OF IRON: CPT

## 2022-11-18 PROCEDURE — 95700 EEG CONT REC W/VID EEG TECH: CPT

## 2022-11-18 PROCEDURE — C1889: CPT

## 2022-11-18 PROCEDURE — 97535 SELF CARE MNGMENT TRAINING: CPT

## 2022-11-18 PROCEDURE — 97530 THERAPEUTIC ACTIVITIES: CPT

## 2022-11-18 PROCEDURE — 82947 ASSAY GLUCOSE BLOOD QUANT: CPT

## 2022-11-18 PROCEDURE — 83930 ASSAY OF BLOOD OSMOLALITY: CPT

## 2022-11-18 PROCEDURE — 70450 CT HEAD/BRAIN W/O DYE: CPT

## 2022-11-18 PROCEDURE — 80053 COMPREHEN METABOLIC PANEL: CPT

## 2022-11-18 PROCEDURE — 86901 BLOOD TYPING SEROLOGIC RH(D): CPT

## 2022-11-18 PROCEDURE — 88307 TISSUE EXAM BY PATHOLOGIST: CPT

## 2022-11-18 PROCEDURE — 84443 ASSAY THYROID STIM HORMONE: CPT

## 2022-11-18 PROCEDURE — 88305 TISSUE EXAM BY PATHOLOGIST: CPT

## 2022-11-18 PROCEDURE — 93306 TTE W/DOPPLER COMPLETE: CPT

## 2022-11-18 PROCEDURE — 88360 TUMOR IMMUNOHISTOCHEM/MANUAL: CPT

## 2022-11-18 PROCEDURE — 93321 DOPPLER ECHO F-UP/LMTD STD: CPT

## 2022-11-18 PROCEDURE — 80061 LIPID PANEL: CPT

## 2022-11-18 PROCEDURE — 82728 ASSAY OF FERRITIN: CPT

## 2022-11-18 PROCEDURE — 82962 GLUCOSE BLOOD TEST: CPT

## 2022-11-18 PROCEDURE — 81003 URINALYSIS AUTO W/O SCOPE: CPT

## 2022-11-18 PROCEDURE — 84295 ASSAY OF SERUM SODIUM: CPT

## 2022-11-18 PROCEDURE — 83735 ASSAY OF MAGNESIUM: CPT

## 2022-11-18 PROCEDURE — 84484 ASSAY OF TROPONIN QUANT: CPT

## 2022-11-18 PROCEDURE — 83550 IRON BINDING TEST: CPT

## 2022-11-18 PROCEDURE — 82550 ASSAY OF CK (CPK): CPT

## 2022-11-18 PROCEDURE — 82803 BLOOD GASES ANY COMBINATION: CPT

## 2022-11-18 PROCEDURE — 87635 SARS-COV-2 COVID-19 AMP PRB: CPT

## 2022-11-18 PROCEDURE — 0042T: CPT

## 2022-11-18 PROCEDURE — 83605 ASSAY OF LACTIC ACID: CPT

## 2022-11-18 PROCEDURE — 85610 PROTHROMBIN TIME: CPT

## 2022-11-18 PROCEDURE — 70553 MRI BRAIN STEM W/O & W/DYE: CPT

## 2022-11-18 PROCEDURE — 92523 SPEECH SOUND LANG COMPREHEN: CPT

## 2022-11-18 PROCEDURE — 84466 ASSAY OF TRANSFERRIN: CPT

## 2022-11-18 PROCEDURE — 97164 PT RE-EVAL EST PLAN CARE: CPT

## 2022-11-18 PROCEDURE — 85730 THROMBOPLASTIN TIME PARTIAL: CPT

## 2022-11-18 PROCEDURE — 82553 CREATINE MB FRACTION: CPT

## 2022-11-18 PROCEDURE — 86803 HEPATITIS C AB TEST: CPT

## 2022-11-18 PROCEDURE — A9585: CPT

## 2022-11-18 PROCEDURE — 36415 COLL VENOUS BLD VENIPUNCTURE: CPT

## 2022-11-18 PROCEDURE — U0003: CPT

## 2022-11-18 PROCEDURE — 86900 BLOOD TYPING SEROLOGIC ABO: CPT

## 2022-11-18 PROCEDURE — 82272 OCCULT BLD FECES 1-3 TESTS: CPT

## 2022-11-18 PROCEDURE — 71045 X-RAY EXAM CHEST 1 VIEW: CPT

## 2022-11-18 PROCEDURE — 84436 ASSAY OF TOTAL THYROXINE: CPT

## 2022-11-18 PROCEDURE — 82330 ASSAY OF CALCIUM: CPT

## 2022-11-18 PROCEDURE — 88311 DECALCIFY TISSUE: CPT

## 2022-11-18 PROCEDURE — 70551 MRI BRAIN STEM W/O DYE: CPT

## 2022-11-18 RX ORDER — HYDROCORTISONE 1 %
1 OINTMENT (GRAM) TOPICAL
Qty: 0 | Refills: 0 | DISCHARGE
Start: 2022-11-18

## 2022-11-18 RX ORDER — LIDOCAINE 4 G/100G
1 CREAM TOPICAL
Qty: 0 | Refills: 0 | DISCHARGE
Start: 2022-11-18

## 2022-11-18 RX ORDER — METOPROLOL TARTRATE 50 MG
1 TABLET ORAL
Qty: 0 | Refills: 0 | DISCHARGE
Start: 2022-11-18

## 2022-11-18 RX ORDER — METOPROLOL TARTRATE 50 MG
1 TABLET ORAL
Qty: 0 | Refills: 0 | DISCHARGE

## 2022-11-18 RX ADMIN — Medication 100 MILLIGRAM(S): at 08:21

## 2022-11-18 RX ADMIN — Medication 1 SUPPOSITORY(S): at 11:21

## 2022-11-18 RX ADMIN — Medication 81 MILLIGRAM(S): at 11:21

## 2022-11-18 RX ADMIN — LIDOCAINE 1 PATCH: 4 CREAM TOPICAL at 12:16

## 2022-11-18 NOTE — PROGRESS NOTE ADULT - THIS PATIENT HAS THE FOLLOWING CONDITION(S)/DIAGNOSES ON THIS ADMISSION:
None

## 2022-11-18 NOTE — PROGRESS NOTE ADULT - PROBLEM SELECTOR PLAN 5
Resolved, no signs of infetion
- Anemia with downtrending Hgb since admission of 11.2  - Stable  - No evidence of acute blood loss at this time, likely in the post operative state    DVT prophy: lovenox  Dispo: [facility] vs home with family assistance pending clinical improvement
- in post operative period  - Would stop all seroquel given episodes of nonresponsiveness
- Anemia with downtrending Hgb since admission of 11.2  - Stable  - No evidence of acute blood loss at this time, likely in the post operative state    DVT prophy: lovenox  Dispo: AR vs home with family assistance pending clinical improvement
Resolved, no signs of infection
- c/w lopressor  - holding lisinopril, BPs soft  - encouraging good PO intake
Resolved, no signs of infection
Resolved, no signs of infetion
Resolved, no signs of infection

## 2022-11-18 NOTE — PROGRESS NOTE ADULT - PROBLEM SELECTOR PLAN 3
Occurred over weekend.  No further BRBPR, Hgb has remained stable  - restarted DVT ppx, continue ASA  - C scope 11/15 with internal hemorrhoids, likely the source of BRBPR. c/w  high fiber diet  - can use PRN hydrocortisone topical or suppositories as needed

## 2022-11-18 NOTE — PROGRESS NOTE ADULT - SUBJECTIVE AND OBJECTIVE BOX
HPI:  77 year old female with pmh HTN who lives in [place] presents with left frontal mass, likely meningioma. Per son, meningioma was diagnosed upon CT head performed for memory loss and confusion. Son reports that confusion has been ongoing for several months. Patient admits to mild headache but denies diplopia, blurry vision, nausea, vomiting, extremity numbness or weakness and slurred speech. Patient is preop for bifrontal craniotomy for meningioma resection 10/27.   (26 Oct 2022 10:58)      Subjective:  BALJINDER overnight. Patient denies any acute complaints.     Hospital course:  10/27: POD# 0 S/P bifrontal craniotomy for removal of skull base mass (frozen: meningioma.) Postop, Pt reports mild headache otherwise neurologically stable. Waxing/waning neuro exam. CTH stable, but with significant edema. Na 143, given 250cc 3% bolus.   10/28: POD1. CTH overnight stable. Oxycodone dc'd due to intermittent lethargy. He removed pending TOV. Failed TOV, straight cath prn.   10/29: POD2. Neuro stable. JPx2. Agitated overnight, precedex started transiently. SBP drop, precedex dc'd and 1L bolus given. Started on seroquel 12.5 at bedtime prn.  10/30: POD3, given additional 12.5 mg seroquel and placed on wrist restraints overnight for agitation. Wrist restraints removed. JUN # 2 removed. Metoprolol increased to 50 BID. Iron studies ordered for microcytic anemia.    10/31: POD4 BALJINDER overnight   11/1: POD5. patient agitated overnight attemtping to leave hospital, security at bedside. ordered 1:1 supervision. psych consulted, recc 10mg melatonin qd, seroquel 100mg BID and zyprexa up to 30mg/day. patient on restraints: b/l wrist/mittens, and soft vest./98 this morning hydral given, pending nutrition consult, upgraded to ICU for decreased level of arousal   11/2: POD 6 mening resection, ICU overnight, mental status improved but not fully back to post op baseline per son. CTH in AM stable, moved to SDU. Seroquel dose decreased per  reccomendations.   11/3: POD 7 meningioma resection. mental status improving.   11/4: POD 8 meningioma resection   11/5: POD 9 meningioma resection. Staples in place. BALJINDER overnight.   11/6: POD 10 resection. BALJINDER overnight.   11/7: POD 11 rsxn, BALJINDER overnight. Pt was in the bathroom brushing her teeth with her daughter with her when she suddenly felt light headed and became unresponsive, the nurse called a Rapid response and patient was brought back to the bed. Vitals were assessed at this time, FS was normal 118, pt was satting 95 on room air, BP was in the systolic 90's, 1 L normal saline was given. Pt returned to her neurologic baseline of AOx1 (self). EKG obtained, BMP/lactate/cardiac enzymes drawn. Pt remains in bed with 1 L bolus over a pressure bag. Dr. Garnica was notified and the hospitalist Dr. Cruz was at bedside. Lactate 3.5, given additional 1 L bolus. Lactate normalized to 1.2. Troponin normalized. Pt back to neurologic baseline.   11/8: POD12. BALJINDER overnight. No issues with vitals overnight, patient feels well this am. Staples removed from bicoronal crani incision, healing well.  Stroke code for change in mental status and new L facial, L weakness, workup wnl, also with new gaze preference, stroke following started on Asa 81, Decadron restarted, Briviat changed to Keppra and EEG monitoring   11/9: POD13, BALJINDER, on EEG overnight negative for seizures, 2% stopped and started on salt tabs, repeat sodium stable 140. EEG negative for seizures and d/c'ed.   11/10: POD14, BALJINDER overnight, off EEG (neg for seizure, showed some mod bifrontal slowing R>L). ASA 81mg QD. Neuro stable. MRI brain demonstrating new ischemic strokes, started on atorvastatin, echo with bubble ordered.   11/11: POD 14, BALJINDER o/n. Pending Echo. no issues overnight, intermittent headaches and more sleepy today however neuro status at baseline and moving all extremities non-focally, following commands briskly.   11/12: PD 15. BALJINDER o/n. + bright red blood in stool today. GI consulted, recommended holding SQL (ok to cont ASA), full liquid diet, trend CBCs. Fecal occult negative. BM since occurance with no BBB. UA done also negative for heme.   11/13: POD 16. BALJINDER o/n. Decreased salt tabs to 1q8. Plan for colonoscopy tomorrow. Iron studies pending.   11/14: POD 17. Unable to finish bowel prep overnight, will plan for colonoscopy tomorrow. Echo w/ bubble showed very small PFO.   11/15: POD 18, Colonoscopy complete showing internal hemorrhoids. EP consult for loop recorder. Restarted SQL. discontinued decadron and keppra as per FIORDALIZA. Salt tabs d/c'ed.  11/16: POD 19, BALJINDER overnight. s/p colonoscopy and loop recorder yesterday. Had bowel movement, lisinopril d/c'ed and started on Toprol XL. Lidocaine patch ordered for right knee pain, negative dopplers. Pending AR  11/17: POD20, BALJINDER overnight. , neuro & hemodynamically stable, lidocaine patch helping for R knee pain, pending LUKAS  11/18: POD21, BALJINDER overnight, pending [facility] re[facility] today    Vital Signs Last 24 Hrs  T(C): 36.9 (18 Nov 2022 08:45), Max: 37.4 (18 Nov 2022 05:55)  T(F): 98.5 (18 Nov 2022 08:45), Max: 99.4 (18 Nov 2022 05:55)  HR: 80 (18 Nov 2022 08:45) (80 - 90)  BP: 114/77 (18 Nov 2022 08:45) (114/77 - 123/81)  BP(mean): 93 (18 Nov 2022 05:55) (93 - 95)  RR: 16 (18 Nov 2022 08:45) (16 - 18)  SpO2: 96% (18 Nov 2022 08:45) (96% - 97%)    Parameters below as of 18 Nov 2022 08:45  Patient On (Oxygen Delivery Method): room air        I&O's Summary      PHYSICAL EXAM:  General: NAD, pt is comfortably sitting up in bed, on room air  HEENT: CN II-XII grossly intact, PERRL 3mm briskly reactive, EOMI b/l, face symmetric, tongue midline, neck FROM  Cardiovascular: RRR, normal S1 and S2   Respiratory: lungs CTAB, no wheezing, rhonchi, or crackles   GI: normoactive BS to auscultation, abd soft, NTND   Neuro: A&Ox1-2, No aphasia, speech clear, no dysmetria, no pronator drift. Follows commands.  SLADE x4 spontaneously, 5/5 strength in all extremities throughout. SILT throughout   Extremities: warm and well perfused   Wound/incision: +bicoronal incision, healing well C/D/I        DIET:  [] NPO  [x] Mechanical  [] Tube feeds    LABS:                        9.8    7.08  )-----------( 203      ( 17 Nov 2022 08:42 )             31.0     11-17    143  |  110<H>  |  12  ----------------------------<  88  3.8   |  27  |  0.67    Ca    9.1      17 Nov 2022 08:42  Phos  3.5     11-17  Mg     1.8     11-17              CAPILLARY BLOOD GLUCOSE          Drug Levels: [] N/A    CSF Analysis: [] N/A      Allergies    No Known Drug Allergies  strawberry (Hives)    Intolerances      MEDICATIONS:  Antibiotics:    Neuro:  acetaminophen     Tablet .. 650 milliGRAM(s) Oral every 6 hours PRN    Anticoagulation:  aspirin  chewable 81 milliGRAM(s) Oral daily  enoxaparin Injectable 40 milliGRAM(s) SubCutaneous every 24 hours    OTHER:  atorvastatin 40 milliGRAM(s) Oral at bedtime  benzocaine 15 mG/menthol 3.6 mG Lozenge 1 Lozenge Oral daily  bisacodyl 5 milliGRAM(s) Oral daily PRN  hydrocortisone hemorrhoidal Suppository 1 Suppository(s) Rectal daily  lidocaine   4% Patch 1 Patch Transdermal every 24 hours  lidocaine 2% Injectable 1 Vial(s) Local Injection once  metoprolol succinate  milliGRAM(s) Oral daily  senna 2 Tablet(s) Oral at bedtime    IVF:    CULTURES:    RADIOLOGY & ADDITIONAL TESTS:    HEADACHE    Handoff    MEWS Score    Meningioma    Hypertension    H/O hyperthyroidism    Brain tumor    Brain tumor    Metabolic encephalopathy    Craniotomy for meningioma    Tension type headache    Meningioma    Meningioma    Hypertension    Preoperative testing    Preoperative examination    Prophylactic measure    Agitation    Anemia due to acute blood loss    Hypotension    Toxic encephalopathy    Leukocytosis    CVA (cerebrovascular accident)    Bright red blood per rectum    Craniotomy for meningioma    Status post thyroid surgery    HEADACHE    Hypertension    Room Service Assist    Hypertension    Agitation    Anemia due to acute blood loss    Leukocytosis    CVA (cerebrovascular accident)    Bright red blood per rectum    [email or web address]        ASSESSMENT:  78 y/o female with h/o HTN s/p bifrontal craniotomy for meningioma resection (10/27/22). Post op course c/b delirium, new thalamic strokes on MRI, and LGIB. Now found to have small PFO and s/p loop recorder placement 11/15/22.     Plan:  Neuro:  - Neuro checks/vital signs q4  - Pain control PRN  - off Keppra and decadron   - CTH 11/8 (stroke code) stable but persistent edema, no new acute findings   - EEG 11/8, mod bifrontal slowing R>L, no seizures recorded, d/c'ed   - MRI brain 11/10 demonstrating thalamic ischemic strokes  - Stroke neuro following: Pend echo with bubble and possible loop recorder depending on findings     Cardiac:  - -140  - Holding home lisinopril d/t hypotension   - switched metoprolol to Toprol 100mg  - Echo 11/10: mild LVH EF 75%, repeat echo with bubble 11/14: very small PFO  - Atorvastatin 40 mg daily for stroke core measures   - s/p loop recorder w/ EP 11/15    Pulmonary:  - on RA, IS    GI:  - Regular diet  - Last BM 11/16  - Bowel regimen  - PPI while on steroids  - GI consulted for bloody stool (LGIB), s/p colonoscopy 11/15 showing internal hemorrhoids    Renal:  - Voiding intermittently, straight cath PRN   - salt tabs d/c'ed 11/15     Heme:  - SCDs, SQL   - LE dopplers 11/16: negative for DVT  - Microcytic anemia: iron studies normal     Endo:  - A1C 5.1, no issues    ID:  - afebrile, trend leukocytosis  - augmentin x 10 days (10/31-11/10) per Roseline     Disposition:   - regional status, full code  - PT/OT recs AR, family updated with plan    Assessment and Plan d/w Dr. Garnica       Assessment:  Present when checked    []  GCS  E   V  M     Heart Failure: []Acute, [] acute on chronic , []chronic  Heart Failure:  [] Diastolic (HFpEF), [] Systolic (HFrEF), []Combined (HFpEF and HFrEF), [] RHF, [] Pulm HTN, [] Other    [] OZZIE, [] ATN, [] AIN, [] other  [] CKD1, [] CKD2, [] CKD 3, [] CKD 4, [] CKD 5, []ESRD    Encephalopathy: [] Metabolic, [] Hepatic, [] toxic, [] Neurological, [] Other    Abnormal Nurtitional Status: [] malnurtition (see nutrition note), [ ]underweight: BMI < 19, [] morbid obesity: BMI >40, [] Cachexia    [] Sepsis  [] hypovolemic shock,[] cardiogenic shock, [] hemorrhagic shock, [] neuogenic shock  [] Acute Respiratory Failure  []Cerebral edema, [] Brain compression/ herniation,   [] Functional quadriplegia  [] Acute blood loss anemia

## 2022-11-18 NOTE — DISCHARGE NOTE NURSING/CASE MANAGEMENT/SOCIAL WORK - NSDCFUADDAPPT_GEN_ALL_CORE_FT
Please follow up with Dr. Garnica from Neurosurgery outpatient, call the office to make/confirm appointment at [phone]     Please follow up with Dr. Mcdaniel from Stroke Neurology outpatient.    Please follow up with EP outpatient in 4-6 weeks. Call [phone] for an appointment.     Please follow up with Dr. Torres from GI if needed outpatient.    Please follow up with your primary care doctor outpatient

## 2022-11-18 NOTE — PROGRESS NOTE ADULT - PROBLEM SELECTOR PROBLEM 4
Hypotension
Anemia due to acute blood loss
Hypotension
Anemia due to acute blood loss
Hypotension
Agitation
Hypertension
Hypotension
Agitation
Leukocytosis
Hypotension

## 2022-11-18 NOTE — PROGRESS NOTE ADULT - PROBLEM SELECTOR PROBLEM 6
Hypertension
Anemia due to acute blood loss
Hypertension
Agitation
Hypertension

## 2022-11-18 NOTE — PROGRESS NOTE ADULT - PROBLEM SELECTOR PROBLEM 1
Meningioma

## 2022-11-18 NOTE — PROGRESS NOTE ADULT - SUBJECTIVE AND OBJECTIVE BOX
SUBJECTIVE:  Patient seen and examined at bedside.  Feels well this AM, reports she slept well last night.  no new complaints    ROS:  Denies fevers, chills, headache, vision changes, neck pain, cough, SOB, chest pain, Abdominal pain, N/V, dysuria or new rash.  All other ROS negative except as above    Vital Signs Last 12 Hrs  T(F): 98.5 (11-18-22 @ 08:45), Max: 99.4 (11-18-22 @ 05:55)  HR: 80 (11-18-22 @ 08:45) (80 - 89)  BP: 114/77 (11-18-22 @ 08:45) (114/77 - 119/80)  BP(mean): 93 (11-18-22 @ 05:55) (93 - 93)  RR: 16 (11-18-22 @ 08:45) (16 - 16)  SpO2: 96% (11-18-22 @ 08:45) (96% - 97%)  I&O's Summary      PHYSICAL EXAM:  Constitutional: NAD, comfortable in bed.  HEENT: PERRLA, EOMI, MMM, crani incision c/d/i  Neck: Supple  Respiratory: CTA B/L. No w/r/r.   Cardiovascular: RRR, normal S1 and S2, no m/r/g. loop recorder site c/d/i  Gastrointestinal: +BS, soft NTND, no guarding or rebound tenderness, no palpable masses   Extremities: wwp; no cyanosis, clubbing or edema.   Vascular: Pulses equal and strong throughout.   Neurological: AAOx1-2 to self and knows it is a hospital, tangential, strength and sensation intact throughout        LABS:                        9.8    7.08  )-----------( 203      ( 17 Nov 2022 08:42 )             31.0     11-17    143  |  110<H>  |  12  ----------------------------<  88  3.8   |  27  |  0.67    Ca    9.1      17 Nov 2022 08:42  Phos  3.5     11-17  Mg     1.8     11-17              RADIOLOGY & ADDITIONAL TESTS:  No new imaging    MEDICATIONS  (STANDING):  aspirin  chewable 81 milliGRAM(s) Oral daily  atorvastatin 40 milliGRAM(s) Oral at bedtime  benzocaine 15 mG/menthol 3.6 mG Lozenge 1 Lozenge Oral daily  enoxaparin Injectable 40 milliGRAM(s) SubCutaneous every 24 hours  hydrocortisone hemorrhoidal Suppository 1 Suppository(s) Rectal daily  lidocaine   4% Patch 1 Patch Transdermal every 24 hours  lidocaine 2% Injectable 1 Vial(s) Local Injection once  metoprolol succinate  milliGRAM(s) Oral daily  senna 2 Tablet(s) Oral at bedtime    MEDICATIONS  (PRN):  acetaminophen     Tablet .. 650 milliGRAM(s) Oral every 6 hours PRN Mild Pain (1 - 3)  bisacodyl 5 milliGRAM(s) Oral daily PRN Constipation

## 2022-11-18 NOTE — PROGRESS NOTE ADULT - PROBLEM SELECTOR PROBLEM 5
Anemia due to acute blood loss
Agitation
Anemia due to acute blood loss
Leukocytosis
Hypertension
Leukocytosis

## 2022-11-18 NOTE — PROGRESS NOTE ADULT - PROBLEM SELECTOR PROBLEM 2
Hypotension
Hypertension
Hypotension
CVA (cerebrovascular accident)
Hypertension
Hypotension
CVA (cerebrovascular accident)

## 2022-11-18 NOTE — PROGRESS NOTE ADULT - PROBLEM SELECTOR PLAN 1
- s/p resection 10/27  - Course complicated by agitation, now improved  - seizure prophylaxis per primary team  - Management per primary team  - PT recommends Acute rehab, plan for dc today

## 2022-11-18 NOTE — PROGRESS NOTE ADULT - PROVIDER SPECIALTY LIST ADULT
Hospitalist
NSICU
NSICU
Neurology
Neurosurgery
Gastroenterology
Hospitalist
Hospitalist
NSICU
NSICU
Neurosurgery
Electrophysiology
Hospitalist
NSICU
Neurosurgery
Gastroenterology
Hospitalist
Neurosurgery
NSICU
Hospitalist
Internal Medicine
Hospitalist
Hospitalist
Internal Medicine
Hospitalist

## 2022-11-18 NOTE — PROGRESS NOTE ADULT - REASON FOR ADMISSION
meningioma

## 2022-11-18 NOTE — PROGRESS NOTE ADULT - PROBLEM SELECTOR PLAN 4
Resolved, was likely vasovagal event on 11/7  - Trops and repeat lactate normalized  - no signs of infection for source of sepsis
- Anemia with downtrending Hgb since admission of 11.2  - Stable  - No evidence of acute blood loss at this time, likely in the post operative state    DVT prophy: lovenox  Dispo: AR vs home with family assistance pending clinical improvement
Possibly reactive vs hemoconcentration as all cell lines up  - no signs of new infection  - will continue to trend
- c/w lopressor  - holding lisinopril, BPs soft  - encouraging good PO intake
- in post operative period  - Seroquel at bedtime 12.5mg and PRN 12.5mg - mental status significantly improved with this lower dose
- Anemia with downtrending Hgb since admission of 11.2  - Stable  - No evidence of acute blood loss at this time, likely in the post operative state    DVT prophy: lovenox  Dispo: AR vs home with family assistance pending clinical improvement
Resolved, was likely vasovagal event on 11/7  - Trops and repeat lactate normalized  - no signs of infection for source of sepsis
- in post operative period  - Would stop all seroquel given episodes of nonresponsiveness
Resolved, was likely vasovagal event on 11/7  - Trops and repeat lactate normalized  - no signs of infection for source of sepsis

## 2022-11-18 NOTE — DISCHARGE NOTE NURSING/CASE MANAGEMENT/SOCIAL WORK - PATIENT PORTAL LINK FT
You can access the FollowMyHealth Patient Portal offered by [facility] by registering at the following website: [email or web address]. By joining [facility]’s FollowMyHealth portal, you will also be able to view your health information using other applications (apps) compatible with our system.

## 2022-11-18 NOTE — DISCHARGE NOTE NURSING/CASE MANAGEMENT/SOCIAL WORK - NSDCPEFALRISK_GEN_ALL_CORE
For information on Fall & Injury Prevention, visit: [email or web address] OR  [email or web address] OR  https://www.cdc.gov/steadi/patient.html

## 2022-11-18 NOTE — PROGRESS NOTE ADULT - PROBLEM SELECTOR PROBLEM 3
Bright red blood per rectum
Hypertension
Agitation
Bright red blood per rectum
Hypertension
Leukocytosis
Hypotension
Agitation
Bright red blood per rectum

## 2022-11-22 DIAGNOSIS — I63.89 OTHER CEREBRAL INFARCTION: ICD-10-CM

## 2022-11-22 DIAGNOSIS — Q21.12 PATENT FORAMEN OVALE: ICD-10-CM

## 2022-11-22 DIAGNOSIS — Z91.018 ALLERGY TO OTHER FOODS: ICD-10-CM

## 2022-11-22 DIAGNOSIS — G93.5 COMPRESSION OF BRAIN: ICD-10-CM

## 2022-11-22 DIAGNOSIS — R29.723 NIHSS SCORE 23: ICD-10-CM

## 2022-11-22 DIAGNOSIS — Z79.82 LONG TERM (CURRENT) USE OF ASPIRIN: ICD-10-CM

## 2022-11-22 DIAGNOSIS — G44.209 TENSION-TYPE HEADACHE, UNSPECIFIED, NOT INTRACTABLE: ICD-10-CM

## 2022-11-22 DIAGNOSIS — E05.90 THYROTOXICOSIS, UNSPECIFIED WITHOUT THYROTOXIC CRISIS OR STORM: ICD-10-CM

## 2022-11-22 DIAGNOSIS — I10 ESSENTIAL (PRIMARY) HYPERTENSION: ICD-10-CM
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
## 2022-11-22 DIAGNOSIS — R47.01 APHASIA: ICD-10-CM

## 2022-11-22 DIAGNOSIS — D50.9 IRON DEFICIENCY ANEMIA, UNSPECIFIED: ICD-10-CM

## 2022-11-22 DIAGNOSIS — D62 ACUTE POSTHEMORRHAGIC ANEMIA: ICD-10-CM

## 2022-11-22 DIAGNOSIS — G81.94 HEMIPLEGIA, UNSPECIFIED AFFECTING LEFT NONDOMINANT SIDE: ICD-10-CM

## 2022-11-22 DIAGNOSIS — G93.6 CEREBRAL EDEMA: ICD-10-CM

## 2022-11-22 DIAGNOSIS — Z78.1 PHYSICAL RESTRAINT STATUS: ICD-10-CM

## 2022-11-22 DIAGNOSIS — K64.0 FIRST DEGREE HEMORRHOIDS: ICD-10-CM

## 2022-11-22 DIAGNOSIS — M25.561 PAIN IN RIGHT KNEE: ICD-10-CM

## 2022-11-22 DIAGNOSIS — R45.1 RESTLESSNESS AND AGITATION: ICD-10-CM

## 2022-11-22 DIAGNOSIS — E87.0 HYPEROSMOLALITY AND HYPERNATREMIA: ICD-10-CM

## 2022-11-22 DIAGNOSIS — D32.0 BENIGN NEOPLASM OF CEREBRAL MENINGES: ICD-10-CM

## 2022-11-22 DIAGNOSIS — T42.75XA ADVERSE EFFECT OF UNSPECIFIED ANTIEPILEPTIC AND SEDATIVE-HYPNOTIC DRUGS, INITIAL ENCOUNTER: ICD-10-CM

## 2022-11-22 DIAGNOSIS — I62.00 NONTRAUMATIC SUBDURAL HEMORRHAGE, UNSPECIFIED: ICD-10-CM

## 2022-11-22 DIAGNOSIS — D72.829 ELEVATED WHITE BLOOD CELL COUNT, UNSPECIFIED: ICD-10-CM

## 2022-11-22 DIAGNOSIS — I95.2 HYPOTENSION DUE TO DRUGS: ICD-10-CM

## 2022-11-22 DIAGNOSIS — G93.41 METABOLIC ENCEPHALOPATHY: ICD-10-CM

## 2022-12-02 ENCOUNTER — APPOINTMENT (OUTPATIENT)
Dept: NEUROSURGERY | Facility: CLINIC | Age: 77
End: 2022-12-02

## 2022-12-02 ENCOUNTER — FORM ENCOUNTER (OUTPATIENT)
Age: 77
End: 2022-12-02

## 2022-12-06 ENCOUNTER — NON-APPOINTMENT (OUTPATIENT)
Age: 77
End: 2022-12-06

## 2022-12-09 ENCOUNTER — NON-APPOINTMENT (OUTPATIENT)
Age: 77
End: 2022-12-09

## 2022-12-09 ENCOUNTER — APPOINTMENT (OUTPATIENT)
Dept: NEUROSURGERY | Facility: CLINIC | Age: 77
End: 2022-12-09

## 2022-12-09 VITALS
BODY MASS INDEX: 28.34 KG/M2 | SYSTOLIC BLOOD PRESSURE: 138 MMHG | RESPIRATION RATE: 17 BRPM | OXYGEN SATURATION: 99 % | HEART RATE: 72 BPM | DIASTOLIC BLOOD PRESSURE: 85 MMHG | WEIGHT: 187 LBS | HEIGHT: 68 IN | TEMPERATURE: 96.2 F

## 2022-12-09 PROCEDURE — 99024 POSTOP FOLLOW-UP VISIT: CPT

## 2022-12-09 NOTE — HISTORY OF PRESENT ILLNESS
[de-identified] : 77 year old female who lives in [place] presents with newly diagnosed left frontal mass. She has initially presented with cognitive decline and emotional lability and presents with her son and daughter.\par \par She is now s/p craniotomy for resection of WHO grade I meningioma 10/27/22. \par Her postop course was complicated by embolic stroke secondary to PFO which required closure and seizure. She is on 81mg ASA and lipitor. \par She was discharged to [facility] rehab and is now living with her daughter in [place]. She does not plan to return to [place] at this time.\par

## 2022-12-09 NOTE — REASON FOR VISIT
[Other: _____] : [unfilled] [de-identified] : craniotomy for meningioma resection [de-identified] : 10/27/22 [de-identified] : PATH: WHO grade I meningioma

## 2022-12-09 NOTE — PHYSICAL EXAM
[General Appearance - Alert] : alert [General Appearance - In No Acute Distress] : in no acute distress [Clean] : clean [Dry] : dry [Well-Healed] : well-healed [Intact] : intact [No Drainage] : without drainage [Normal Skin] : normal [] : no respiratory distress [FreeTextEntry1] : labile emotions [de-identified] : expressive aphasia

## 2022-12-09 NOTE — ASSESSMENT
[FreeTextEntry1] : My impression is that the patient suffers from a resected WHO grade I meningioma.   The patient’s established problem of aphasia is improving slowly.  Her KPS is 70 . I had a long discussion with the patient regarding the role of follow up with serial imaging.  The patient was extensively educated about the nature of her disease process. Therapeutic and diagnostic tests include MRI brain with and without contrast January 2023.    I will see the patient back in January. I have explained the alternatives, risks and benefits to the patient and she understands and agrees to proceed.  \par

## 2022-12-13 ENCOUNTER — NON-APPOINTMENT (OUTPATIENT)
Age: 77
End: 2022-12-13

## 2022-12-15 ENCOUNTER — APPOINTMENT (OUTPATIENT)
Dept: HEART AND VASCULAR | Facility: CLINIC | Age: 77
End: 2022-12-15

## 2023-01-30 ENCOUNTER — RX RENEWAL (OUTPATIENT)
Age: 78
End: 2023-01-30

## 2023-02-10 ENCOUNTER — APPOINTMENT (OUTPATIENT)
Dept: HEART AND VASCULAR | Facility: CLINIC | Age: 78
End: 2023-02-10
Payer: MEDICARE

## 2023-02-10 ENCOUNTER — NON-APPOINTMENT (OUTPATIENT)
Age: 78
End: 2023-02-10

## 2023-02-10 PROCEDURE — G2066: CPT

## 2023-02-10 PROCEDURE — 93298 REM INTERROG DEV EVAL SCRMS: CPT

## 2023-02-27 ENCOUNTER — RX RENEWAL (OUTPATIENT)
Age: 78
End: 2023-02-27

## 2023-03-17 ENCOUNTER — APPOINTMENT (OUTPATIENT)
Dept: HEART AND VASCULAR | Facility: CLINIC | Age: 78
End: 2023-03-17
Payer: MEDICARE

## 2023-03-17 ENCOUNTER — NON-APPOINTMENT (OUTPATIENT)
Age: 78
End: 2023-03-17

## 2023-03-17 PROCEDURE — G2066: CPT

## 2023-03-17 PROCEDURE — 93298 REM INTERROG DEV EVAL SCRMS: CPT

## 2023-04-21 ENCOUNTER — APPOINTMENT (OUTPATIENT)
Dept: HEART AND VASCULAR | Facility: CLINIC | Age: 78
End: 2023-04-21
Payer: MEDICARE

## 2023-04-21 ENCOUNTER — NON-APPOINTMENT (OUTPATIENT)
Age: 78
End: 2023-04-21

## 2023-04-21 PROCEDURE — 93298 REM INTERROG DEV EVAL SCRMS: CPT

## 2023-04-21 PROCEDURE — G2066: CPT

## 2023-05-26 ENCOUNTER — NON-APPOINTMENT (OUTPATIENT)
Age: 78
End: 2023-05-26

## 2023-05-26 ENCOUNTER — APPOINTMENT (OUTPATIENT)
Dept: HEART AND VASCULAR | Facility: CLINIC | Age: 78
End: 2023-05-26

## 2023-06-30 ENCOUNTER — APPOINTMENT (OUTPATIENT)
Dept: HEART AND VASCULAR | Facility: CLINIC | Age: 78
End: 2023-06-30
Payer: MEDICARE

## 2023-06-30 ENCOUNTER — NON-APPOINTMENT (OUTPATIENT)
Age: 78
End: 2023-06-30

## 2023-06-30 PROCEDURE — G2066: CPT

## 2023-06-30 PROCEDURE — 93298 REM INTERROG DEV EVAL SCRMS: CPT

## 2023-07-10 ENCOUNTER — NON-APPOINTMENT (OUTPATIENT)
Age: 78
End: 2023-07-10

## 2023-07-10 ENCOUNTER — APPOINTMENT (OUTPATIENT)
Dept: FAMILY MEDICINE | Facility: CLINIC | Age: 78
End: 2023-07-10
Payer: MEDICARE

## 2023-07-10 VITALS
HEIGHT: 68 IN | TEMPERATURE: 96.4 F | SYSTOLIC BLOOD PRESSURE: 158 MMHG | HEART RATE: 64 BPM | OXYGEN SATURATION: 99 % | BODY MASS INDEX: 25.91 KG/M2 | WEIGHT: 171 LBS | DIASTOLIC BLOOD PRESSURE: 64 MMHG

## 2023-07-10 VITALS — SYSTOLIC BLOOD PRESSURE: 138 MMHG | DIASTOLIC BLOOD PRESSURE: 84 MMHG

## 2023-07-10 DIAGNOSIS — Z00.00 ENCOUNTER FOR GENERAL ADULT MEDICAL EXAMINATION W/OUT ABNORMAL FINDINGS: ICD-10-CM

## 2023-07-10 DIAGNOSIS — Z78.9 OTHER SPECIFIED HEALTH STATUS: ICD-10-CM

## 2023-07-10 DIAGNOSIS — Z86.79 PERSONAL HISTORY OF OTHER DISEASES OF THE CIRCULATORY SYSTEM: ICD-10-CM

## 2023-07-10 DIAGNOSIS — Z63.4 DISAPPEARANCE AND DEATH OF FAMILY MEMBER: ICD-10-CM

## 2023-07-10 PROCEDURE — G0439: CPT

## 2023-07-10 PROCEDURE — 99204 OFFICE O/P NEW MOD 45 MIN: CPT | Mod: 25

## 2023-07-10 SDOH — SOCIAL STABILITY - SOCIAL INSECURITY: DISSAPEARANCE AND DEATH OF FAMILY MEMBER: Z63.4

## 2023-07-10 NOTE — HEALTH RISK ASSESSMENT
[Good] : ~his/her~  mood as  good [No] : In the past 12 months have you used drugs other than those required for medical reasons? No [0] : 2) Feeling down, depressed, or hopeless: Not at all (0) [PHQ-2 Negative - No further assessment needed] : PHQ-2 Negative - No further assessment needed [Patient reported mammogram was normal] : Patient reported mammogram was normal [Patient declined bone density test] : Patient declined bone density test [Patient declined colonoscopy] : Patient declined colonoscopy [HIV test declined] : HIV test declined [Hepatitis C test declined] : Hepatitis C test declined [None] : None [With Family] : lives with family [Retired] : retired [] :  [Feels Safe at Home] : Feels safe at home [Fully functional (bathing, dressing, toileting, transferring, walking, feeding)] : Fully functional (bathing, dressing, toileting, transferring, walking, feeding) [Fully functional (using the telephone, shopping, preparing meals, housekeeping, doing laundry, using] : Fully functional and needs no help or supervision to perform IADLs (using the telephone, shopping, preparing meals, housekeeping, doing laundry, using transportation, managing medications and managing finances) [Never] : Never [Audit-CScore] : 0 [de-identified] : walking  [de-identified] : fruits, veggies  [DNL3Cmhlp] : 0 [Change in mental status noted] : No change in mental status noted [Language] : denies difficulty with language [Sexually Active] : not sexually active [Reports changes in hearing] : Reports no changes in hearing [Reports changes in vision] : Reports no changes in vision [MammogramDate] : 07/21 [MammogramComments] : will refer  [BoneDensityComments] : will refer  [ColonoscopyComments] : will refer  [FreeTextEntry2] : RN

## 2023-07-10 NOTE — PLAN
[FreeTextEntry1] : follow up labs, labs drawn in office \par encouraged healthcare maintenance items \par will send to cardio for ? heart problem and monitor \par will have quanitferon ordered as well \par will connect with neurosx for follow up \par \par anxiety \par increased meds

## 2023-07-19 ENCOUNTER — APPOINTMENT (OUTPATIENT)
Dept: HEART AND VASCULAR | Facility: CLINIC | Age: 78
End: 2023-07-19
Payer: MEDICARE

## 2023-07-19 ENCOUNTER — NON-APPOINTMENT (OUTPATIENT)
Age: 78
End: 2023-07-19

## 2023-07-19 VITALS
OXYGEN SATURATION: 99 % | BODY MASS INDEX: 25.53 KG/M2 | TEMPERATURE: 98 F | HEIGHT: 68 IN | WEIGHT: 168.44 LBS | HEART RATE: 61 BPM | DIASTOLIC BLOOD PRESSURE: 95 MMHG | SYSTOLIC BLOOD PRESSURE: 168 MMHG

## 2023-07-19 LAB
25(OH)D3 SERPL-MCNC: 59.9 NG/ML
ALBUMIN SERPL ELPH-MCNC: 4.3 G/DL
ALP BLD-CCNC: 95 U/L
ALT SERPL-CCNC: 15 U/L
ANION GAP SERPL CALC-SCNC: 10 MMOL/L
AST SERPL-CCNC: 23 U/L
BILIRUB SERPL-MCNC: 0.4 MG/DL
BUN SERPL-MCNC: 15 MG/DL
CALCIUM SERPL-MCNC: 9.7 MG/DL
CHLORIDE SERPL-SCNC: 108 MMOL/L
CHOLEST SERPL-MCNC: 140 MG/DL
CO2 SERPL-SCNC: 26 MMOL/L
CREAT SERPL-MCNC: 0.75 MG/DL
EGFR: 83 ML/MIN/1.73M2
ESTIMATED AVERAGE GLUCOSE: 111 MG/DL
FERRITIN SERPL-MCNC: 51 NG/ML
FOLATE SERPL-MCNC: >20 NG/ML
GLUCOSE SERPL-MCNC: 89 MG/DL
HBA1C MFR BLD HPLC: 5.5 %
HDLC SERPL-MCNC: 75 MG/DL
IRON SATN MFR SERPL: 33 %
IRON SERPL-MCNC: 89 UG/DL
LDLC SERPL CALC-MCNC: 55 MG/DL
M TB IFN-G BLD-IMP: NEGATIVE
NONHDLC SERPL-MCNC: 65 MG/DL
POTASSIUM SERPL-SCNC: 4.4 MMOL/L
PROT SERPL-MCNC: 7 G/DL
QUANTIFERON TB PLUS MITOGEN MINUS NIL: >10 IU/ML
QUANTIFERON TB PLUS NIL: 0.01 IU/ML
QUANTIFERON TB PLUS TB1 MINUS NIL: 0 IU/ML
QUANTIFERON TB PLUS TB2 MINUS NIL: 0 IU/ML
SODIUM SERPL-SCNC: 144 MMOL/L
TIBC SERPL-MCNC: 267 UG/DL
TRIGL SERPL-MCNC: 40 MG/DL
TSH SERPL-ACNC: 1.37 UIU/ML
UIBC SERPL-MCNC: 178 UG/DL
VIT B12 SERPL-MCNC: 1323 PG/ML

## 2023-07-19 PROCEDURE — 99205 OFFICE O/P NEW HI 60 MIN: CPT | Mod: 25

## 2023-07-19 PROCEDURE — 93000 ELECTROCARDIOGRAM COMPLETE: CPT

## 2023-07-19 NOTE — HISTORY OF PRESENT ILLNESS
[FreeTextEntry1] : 77F w HTN (on lisinop 10, metop tart 100 mg bid), HLP, hx of stroke without residual deficits (on atorva 40) who p/w her loop recorder to establish care with cardiology in [facility].\par \par She previously lived in [place], moved to [facility] 8 months ago for brain surgery.  passed away in 2020 and around that time, daughter (with her today) reports she noticed change in character, that led to eventually finding a meningioma in the frontal lobe, initially observed for 6 months, but due to progressing edema prompted surgery in Oct 2022. Had some stroke-like symptoms post surgery, embolic sec to PFO which was closed. A loop recorder was placed for AF surveillance.\par \par She denies having any symptoms of palpitations, lightheadedness, chest pain, KRUGER. She has progressed well with rehab after brain surgery. The stroke was reportedly minor and she has no residual deficitis.\par \par She is a retired nurse and now settled in a [employer] apt across her daughter's place.\par

## 2023-07-19 NOTE — REVIEW OF SYSTEMS
[Negative] : Heme/Lymph [SOB] : no shortness of breath [Dyspnea on exertion] : not dyspnea during exertion [Chest Discomfort] : no chest discomfort [Lower Ext Edema] : no extremity edema [Palpitations] : no palpitations [Orthopnea] : no orthopnea [Syncope] : no syncope

## 2023-07-19 NOTE — REASON FOR VISIT
[FreeTextEntry1] : CV Data Reviewed:\par \par ECG 7/19/23: Sinus bradycardia\par \par Loop recorder: 1 episode of AFib in 1/2023 ~2 minutes\par \par TTE: no prior\par \par Stress Tests: None available\par \par Other Imaging: None available\par

## 2023-07-26 ENCOUNTER — APPOINTMENT (OUTPATIENT)
Dept: GASTROENTEROLOGY | Facility: CLINIC | Age: 78
End: 2023-07-26
Payer: MEDICARE

## 2023-07-26 VITALS
HEART RATE: 79 BPM | HEIGHT: 68 IN | RESPIRATION RATE: 16 BRPM | BODY MASS INDEX: 25.43 KG/M2 | TEMPERATURE: 97.4 F | DIASTOLIC BLOOD PRESSURE: 88 MMHG | OXYGEN SATURATION: 97 % | SYSTOLIC BLOOD PRESSURE: 133 MMHG | WEIGHT: 167.8 LBS

## 2023-07-26 PROCEDURE — 99204 OFFICE O/P NEW MOD 45 MIN: CPT

## 2023-07-26 NOTE — PHYSICAL EXAM

## 2023-07-26 NOTE — ASSESSMENT
[FreeTextEntry1] :  78 yo F PMH HTN, HLD, stroke without residual deficit, meningioma in the frontal lobe (s/p surgery Oct 2022), Afib (with loop recorder) who presents today for evaluation of constipation and CRC screening. Patient presents to visit with aide today.\par \par Colorectal cancer screening\par - contacted patient's daughter during the visit today for collateral \par - underwent colonoscopy that was unremarkable in 2022 (was performed for rectal bleeding), however prior colonoscopies have not had polyps\par - will hold off colonoscopy at this time, can consider repeat colonoscopy in 5 yrs (~2027) unless any new symptoms sooner\par \par Constipation\par - continue magnesium oxide, smooth move tea PRN, probiotics\par - adequate water intake and fiber intake discussed\par - can consider miralax/laxatives if worsening symptoms\par \par Follow up in 1 yr or sooner as needed

## 2023-07-26 NOTE — HISTORY OF PRESENT ILLNESS
[FreeTextEntry1] :  78 yo F PMH HTN, HLD, stroke without residual deficit, meningioma in the frontal lobe (s/p surgery Oct 2022), Afib (with loop recorder) who presents today for evaluation of constipation and CRC screening. Patient presents to visit with aide today.\par \par Sometimes feels a little constipated. \par \par Her PMD referred her because her prior colonoscopy was in 7+ yrs ago. Given age and ethnicity. \par \par No other rectal bleeding since the time in the hospital. \par \par She does have some constipation but uses magnesium, probiotic, smooth move tea for constipation. Tries to drink water and eat vegetables. \par \par 49 had a colonoscopy last month, found 1 polyp\par Patient has never had a polyp\par \par Colonoscopy 2022: \par BPSS 6 (2 in each segment) \par Grade I internal hemorrhoids with stigmata of recent bleeding\par Colon tortuous and required external abdominal pressure to traverse to the cecum\par The colon was otherwise unremarkable\par

## 2023-08-04 ENCOUNTER — NON-APPOINTMENT (OUTPATIENT)
Age: 78
End: 2023-08-04

## 2023-08-04 ENCOUNTER — APPOINTMENT (OUTPATIENT)
Dept: HEART AND VASCULAR | Facility: CLINIC | Age: 78
End: 2023-08-04
Payer: MEDICARE

## 2023-08-04 PROCEDURE — G2066: CPT

## 2023-08-04 PROCEDURE — 93298 REM INTERROG DEV EVAL SCRMS: CPT

## 2023-08-21 ENCOUNTER — APPOINTMENT (OUTPATIENT)
Dept: FAMILY MEDICINE | Facility: CLINIC | Age: 78
End: 2023-08-21
Payer: MEDICARE

## 2023-08-21 VITALS
OXYGEN SATURATION: 100 % | HEART RATE: 60 BPM | DIASTOLIC BLOOD PRESSURE: 74 MMHG | WEIGHT: 167.13 LBS | SYSTOLIC BLOOD PRESSURE: 128 MMHG | BODY MASS INDEX: 25.33 KG/M2 | HEIGHT: 68 IN | TEMPERATURE: 98.1 F

## 2023-08-21 PROCEDURE — 99214 OFFICE O/P EST MOD 30 MIN: CPT

## 2023-08-21 NOTE — HISTORY OF PRESENT ILLNESS
[FreeTextEntry1] : anxiety  bp check  [de-identified] : 76 yo f presents to discuss anxiety  presents with daughter mentioned doing well with meds and transition into new lifestyle and new facility   here for a bp check today as well  taking meds

## 2023-08-21 NOTE — PLAN
[FreeTextEntry1] : follow up labs, labs drawn in office   anxiety  still with anxiety  increased meds again today  reviewed risks, benefits, side effects, alternatives, regimen  follow up in 4 weeks, sooner if needed   bp  stable  cont meds  follow up pending with cardio

## 2023-08-29 NOTE — HISTORY OF PRESENT ILLNESS
Helio El is calling stating she blacked out Sunday and fell. It didn't last long. She was dizzy for a week but feels much better today. She denied an apt but wants to speak with Dr. Jhon Basurto. She stated again that she is feeling fine now. [FreeTextEntry1] : establish care  [de-identified] : 76 yo f presents to establish care. \par Hx of benign brain tumor, successful surgery in October. \emigdio Was in rehab for a few weeks, now resides with her daughter. \par Has been in rehabs-- OT, PT, speech therapy. \par Moved from [place] to [place]. \par \par

## 2023-09-07 ENCOUNTER — APPOINTMENT (OUTPATIENT)
Dept: HEART AND VASCULAR | Facility: CLINIC | Age: 78
End: 2023-09-07
Payer: MEDICARE

## 2023-09-07 ENCOUNTER — NON-APPOINTMENT (OUTPATIENT)
Age: 78
End: 2023-09-07

## 2023-09-07 PROCEDURE — 93298 REM INTERROG DEV EVAL SCRMS: CPT

## 2023-09-07 PROCEDURE — G2066: CPT

## 2023-09-08 ENCOUNTER — APPOINTMENT (OUTPATIENT)
Dept: HEART AND VASCULAR | Facility: CLINIC | Age: 78
End: 2023-09-08
Payer: MEDICARE

## 2023-09-08 VITALS
WEIGHT: 166 LBS | BODY MASS INDEX: 25.16 KG/M2 | HEART RATE: 64 BPM | SYSTOLIC BLOOD PRESSURE: 123 MMHG | HEIGHT: 68 IN | DIASTOLIC BLOOD PRESSURE: 74 MMHG | OXYGEN SATURATION: 97 %

## 2023-09-08 PROCEDURE — 93285 PRGRMG DEV EVAL SCRMS IP: CPT

## 2023-09-08 PROCEDURE — 99213 OFFICE O/P EST LOW 20 MIN: CPT | Mod: 25

## 2023-09-26 ENCOUNTER — APPOINTMENT (OUTPATIENT)
Dept: HEART AND VASCULAR | Facility: CLINIC | Age: 78
End: 2023-09-26

## 2023-10-12 ENCOUNTER — NON-APPOINTMENT (OUTPATIENT)
Age: 78
End: 2023-10-12

## 2023-10-12 ENCOUNTER — APPOINTMENT (OUTPATIENT)
Dept: HEART AND VASCULAR | Facility: CLINIC | Age: 78
End: 2023-10-12
Payer: MEDICARE

## 2023-10-13 PROCEDURE — G2066: CPT

## 2023-10-13 PROCEDURE — 93298 REM INTERROG DEV EVAL SCRMS: CPT

## 2023-10-19 ENCOUNTER — RX RENEWAL (OUTPATIENT)
Age: 78
End: 2023-10-19

## 2023-11-16 ENCOUNTER — NON-APPOINTMENT (OUTPATIENT)
Age: 78
End: 2023-11-16

## 2023-11-16 ENCOUNTER — APPOINTMENT (OUTPATIENT)
Dept: HEART AND VASCULAR | Facility: CLINIC | Age: 78
End: 2023-11-16
Payer: MEDICARE

## 2023-11-17 PROCEDURE — 93298 REM INTERROG DEV EVAL SCRMS: CPT

## 2023-11-17 PROCEDURE — G2066: CPT

## 2023-12-20 ENCOUNTER — NON-APPOINTMENT (OUTPATIENT)
Age: 78
End: 2023-12-20

## 2023-12-21 ENCOUNTER — APPOINTMENT (OUTPATIENT)
Dept: HEART AND VASCULAR | Facility: CLINIC | Age: 78
End: 2023-12-21
Payer: MEDICARE

## 2023-12-21 PROCEDURE — 93298 REM INTERROG DEV EVAL SCRMS: CPT

## 2023-12-21 PROCEDURE — G2066: CPT

## 2024-01-12 ENCOUNTER — APPOINTMENT (OUTPATIENT)
Dept: HEART AND VASCULAR | Facility: CLINIC | Age: 79
End: 2024-01-12

## 2024-02-12 ENCOUNTER — APPOINTMENT (OUTPATIENT)
Dept: HEART AND VASCULAR | Facility: CLINIC | Age: 79
End: 2024-02-12
Payer: MEDICARE

## 2024-02-12 ENCOUNTER — NON-APPOINTMENT (OUTPATIENT)
Age: 79
End: 2024-02-12

## 2024-02-13 PROCEDURE — 93298 REM INTERROG DEV EVAL SCRMS: CPT

## 2024-02-16 ENCOUNTER — TRANSCRIPTION ENCOUNTER (OUTPATIENT)
Age: 79
End: 2024-02-16

## 2024-03-14 ENCOUNTER — APPOINTMENT (OUTPATIENT)
Dept: HEART AND VASCULAR | Facility: CLINIC | Age: 79
End: 2024-03-14
Payer: MEDICARE

## 2024-03-14 ENCOUNTER — NON-APPOINTMENT (OUTPATIENT)
Age: 79
End: 2024-03-14

## 2024-03-14 PROCEDURE — 93298 REM INTERROG DEV EVAL SCRMS: CPT

## 2024-04-01 ENCOUNTER — NON-APPOINTMENT (OUTPATIENT)
Age: 79
End: 2024-04-01

## 2024-04-01 ENCOUNTER — LABORATORY RESULT (OUTPATIENT)
Age: 79
End: 2024-04-01

## 2024-04-01 ENCOUNTER — APPOINTMENT (OUTPATIENT)
Dept: FAMILY MEDICINE | Facility: CLINIC | Age: 79
End: 2024-04-01
Payer: MEDICARE

## 2024-04-01 VITALS
TEMPERATURE: 97.7 F | HEIGHT: 68 IN | SYSTOLIC BLOOD PRESSURE: 132 MMHG | OXYGEN SATURATION: 100 % | BODY MASS INDEX: 25.91 KG/M2 | DIASTOLIC BLOOD PRESSURE: 88 MMHG | WEIGHT: 171 LBS | HEART RATE: 62 BPM

## 2024-04-01 VITALS — DIASTOLIC BLOOD PRESSURE: 84 MMHG | SYSTOLIC BLOOD PRESSURE: 134 MMHG

## 2024-04-01 DIAGNOSIS — E78.00 PURE HYPERCHOLESTEROLEMIA, UNSPECIFIED: ICD-10-CM

## 2024-04-01 DIAGNOSIS — F41.9 ANXIETY DISORDER, UNSPECIFIED: ICD-10-CM

## 2024-04-01 DIAGNOSIS — Z86.018 PERSONAL HISTORY OF OTHER BENIGN NEOPLASM: ICD-10-CM

## 2024-04-01 DIAGNOSIS — F32.A ANXIETY DISORDER, UNSPECIFIED: ICD-10-CM

## 2024-04-01 PROCEDURE — 99214 OFFICE O/P EST MOD 30 MIN: CPT | Mod: 25

## 2024-04-01 PROCEDURE — G2211 COMPLEX E/M VISIT ADD ON: CPT | Mod: NC,1L

## 2024-04-01 NOTE — HISTORY OF PRESENT ILLNESS
[FreeTextEntry1] : bp check  mood medication  behavioral changes  [de-identified] : 79 yo f presents to check bp also with increased anxiety, then trouble sleeping  also with behavioral changes and some complaints of head pain on right side-- intermittently, also with forgetfulness (mostly in short term)-- ie will call back to back

## 2024-04-01 NOTE — PLAN
[FreeTextEntry1] : follow up labs, labs drawn in office  encouraged healthcare maintenance items  will connect with neurosx for follow up   anxiety  uncontrolled cont with lexapro  will start buspar bid   reviewed risks, benefits, side effects, alternatives, regimen  follow up in 4 weeks, sooner if needed

## 2024-04-01 NOTE — HEALTH RISK ASSESSMENT
[0] : 2) Feeling down, depressed, or hopeless: Not at all (0) [PHQ-2 Negative - No further assessment needed] : PHQ-2 Negative - No further assessment needed [[id number]] : 0 [Never] : Never

## 2024-04-04 PROBLEM — Z98.890 HISTORY OF RESECTION OF MENINGIOMA: Status: ACTIVE | Noted: 2024-04-04

## 2024-04-08 ENCOUNTER — APPOINTMENT (OUTPATIENT)
Dept: NEUROSURGERY | Facility: CLINIC | Age: 79
End: 2024-04-08
Payer: MEDICARE

## 2024-04-08 DIAGNOSIS — Z86.03 OTHER SPECIFIED POSTPROCEDURAL STATES: ICD-10-CM

## 2024-04-08 DIAGNOSIS — Z98.890 OTHER SPECIFIED POSTPROCEDURAL STATES: ICD-10-CM

## 2024-04-08 PROCEDURE — 99441: CPT

## 2024-04-08 NOTE — REVIEW OF SYSTEMS
[As Noted in HPI] : as noted in HPI [Confused or Disoriented] : confusion [Memory Lapses or Loss] : memory loss [Fever] : no fever [Chills] : no chills

## 2024-04-08 NOTE — ASSESSMENT
[FreeTextEntry1] : 77 y/o female with PMHx of HTN, anxiety, who was found to have left frontal mass Oct 2022 during workup for memory loss/confusion x several months who presented to Dr. Garnica for evaluation; S/P bifrontal craniotomy for resection 10/27/22. Path consistent with Meningioma, CNS WHO grade 1.  Returned today, referred back from PCP Dr. Torres after loss to neurosurgical follow- up.  With recent complaints of left frontal headaches and some intermittent cognitive issues/forgetfulness per patient's daughter.   Will order new MRI brain w/wo contrast. Discussed should follow- up with Dr. Garnica once imaging completed for review.  Should continue f/u with PCP Dr. Torres in the interim for comprehensive care.   Patient 's daughter verbalizes understanding of todays discussion and next steps in treatment plan.

## 2024-04-08 NOTE — HISTORY OF PRESENT ILLNESS
[FreeTextEntry1] : 79 y/o female with PMHx of HTN, anxiety, who was found to have left frontal mass Oct 2022 during workup for memory loss/confusion x several months who presented to Dr. Garnica for evaluation; S/P bifrontal craniotomy for resection 10/27/22.  PATH: Meningioma, CNS WHO grade 1  Her post operative course was complicated by GI bleed due to hemorrhoids and acute thalamic strokes. Subsequent workup revealed a PFO for which she was started on low dose aspirin. She also underwent ILR implantation to evaluate for the presence of occult arrhythmia. Was then discharged to to [facility] 11/18/24.   Was last seen by Dr. Garnica 12/9/22 for post op visit once dc from rehab and was recommended to repeat MRI at 3 months post op.   Returns today after lapse in follow- up. Referred back by PCP Dr. Torres. Daughter Junie assists in ROS/HPI.  Endorses recent headaches to left frontal area. Also with intermittent issues of disorientation/memory issues that is similar to what patient originally presented with.  Denies seizure like activities, other new/worsening focal neuro deficits.   PCP: Oimd Torres Cards: Keely Dorantes

## 2024-04-08 NOTE — REASON FOR VISIT
[Follow-Up: _____] : a [unfilled] follow-up visit [Home] : at home, [unfilled] , at the time of the visit. [Medical Office: ([facility])___] : at the medical office located in  [Family Member] : family member [Verbal consent obtained from patient] : the patient, [unfilled] [FreeTextEntry1] : hx of resected meningioma

## 2024-04-10 ENCOUNTER — APPOINTMENT (OUTPATIENT)
Dept: MRI IMAGING | Facility: HOSPITAL | Age: 79
End: 2024-04-10
Payer: MEDICARE

## 2024-04-10 ENCOUNTER — OUTPATIENT (OUTPATIENT)
Dept: OUTPATIENT SERVICES | Facility: HOSPITAL | Age: 79
LOS: 1 days | End: 2024-04-10
Payer: MEDICARE

## 2024-04-10 DIAGNOSIS — Z98.890 OTHER SPECIFIED POSTPROCEDURAL STATES: Chronic | ICD-10-CM

## 2024-04-10 LAB
ALBUMIN SERPL ELPH-MCNC: 4.2 G/DL
ALP BLD-CCNC: 92 U/L
ALT SERPL-CCNC: 15 U/L
ANION GAP SERPL CALC-SCNC: 11 MMOL/L
AST SERPL-CCNC: 22 U/L
BASOPHILS # BLD AUTO: 0.03 K/UL
BASOPHILS NFR BLD AUTO: 0.4 %
BILIRUB SERPL-MCNC: 0.3 MG/DL
BUN SERPL-MCNC: 14 MG/DL
CALCIUM SERPL-MCNC: 9.3 MG/DL
CHLORIDE SERPL-SCNC: 105 MMOL/L
CHOLEST SERPL-MCNC: 135 MG/DL
CO2 SERPL-SCNC: 26 MMOL/L
CREAT SERPL-MCNC: 0.86 MG/DL
EGFR: 70 ML/MIN/1.73M2
EOSINOPHIL # BLD AUTO: 0.14 K/UL
EOSINOPHIL NFR BLD AUTO: 1.7 %
ESTIMATED AVERAGE GLUCOSE: 114 MG/DL
GLUCOSE SERPL-MCNC: 82 MG/DL
HBA1C MFR BLD HPLC: 5.6 %
HCT VFR BLD CALC: 36.4 %
HDLC SERPL-MCNC: 65 MG/DL
HGB BLD-MCNC: 11.9 G/DL
IMM GRANULOCYTES NFR BLD AUTO: 0.2 %
LDLC SERPL CALC-MCNC: 58 MG/DL
LYMPHOCYTES # BLD AUTO: 1.75 K/UL
LYMPHOCYTES NFR BLD AUTO: 20.9 %
MAN DIFF?: NORMAL
MCHC RBC-ENTMCNC: 22.9 PG
MCHC RBC-ENTMCNC: 32.7 GM/DL
MCV RBC AUTO: 70 FL
MONOCYTES # BLD AUTO: 0.84 K/UL
MONOCYTES NFR BLD AUTO: 10 %
NEUTROPHILS # BLD AUTO: 5.59 K/UL
NEUTROPHILS NFR BLD AUTO: 66.8 %
NONHDLC SERPL-MCNC: 70 MG/DL
PLATELET # BLD AUTO: 177 K/UL
POTASSIUM SERPL-SCNC: 4.3 MMOL/L
PROT SERPL-MCNC: 6.9 G/DL
RBC # BLD: 5.2 M/UL
RBC # FLD: 17.1 %
SODIUM SERPL-SCNC: 142 MMOL/L
TRIGL SERPL-MCNC: 52 MG/DL
TSH SERPL-ACNC: 1.35 UIU/ML
WBC # FLD AUTO: 8.37 K/UL

## 2024-04-10 PROCEDURE — 70553 MRI BRAIN STEM W/O & W/DYE: CPT

## 2024-04-10 PROCEDURE — 70553 MRI BRAIN STEM W/O & W/DYE: CPT | Mod: 26

## 2024-04-10 PROCEDURE — A9585: CPT

## 2024-04-18 ENCOUNTER — NON-APPOINTMENT (OUTPATIENT)
Age: 79
End: 2024-04-18

## 2024-04-18 ENCOUNTER — APPOINTMENT (OUTPATIENT)
Dept: HEART AND VASCULAR | Facility: CLINIC | Age: 79
End: 2024-04-18
Payer: MEDICARE

## 2024-04-18 PROCEDURE — 93298 REM INTERROG DEV EVAL SCRMS: CPT

## 2024-04-19 ENCOUNTER — APPOINTMENT (OUTPATIENT)
Dept: HEART AND VASCULAR | Facility: CLINIC | Age: 79
End: 2024-04-19
Payer: MEDICARE

## 2024-04-19 VITALS
HEART RATE: 57 BPM | BODY MASS INDEX: 26.15 KG/M2 | DIASTOLIC BLOOD PRESSURE: 78 MMHG | WEIGHT: 172.56 LBS | OXYGEN SATURATION: 99 % | HEIGHT: 68 IN | SYSTOLIC BLOOD PRESSURE: 143 MMHG

## 2024-04-19 DIAGNOSIS — I10 ESSENTIAL (PRIMARY) HYPERTENSION: ICD-10-CM

## 2024-04-19 DIAGNOSIS — Z95.818 PRESENCE OF OTHER CARDIAC IMPLANTS AND GRAFTS: ICD-10-CM

## 2024-04-19 DIAGNOSIS — E78.5 HYPERLIPIDEMIA, UNSPECIFIED: ICD-10-CM

## 2024-04-19 PROCEDURE — G2211 COMPLEX E/M VISIT ADD ON: CPT

## 2024-04-19 PROCEDURE — 99214 OFFICE O/P EST MOD 30 MIN: CPT

## 2024-04-19 RX ORDER — ATORVASTATIN CALCIUM 80 MG/1
80 TABLET, FILM COATED ORAL
Qty: 90 | Refills: 3 | Status: ACTIVE | COMMUNITY
Start: 2022-12-29 | End: 1900-01-01

## 2024-04-19 RX ORDER — LISINOPRIL 20 MG/1
20 TABLET ORAL DAILY
Qty: 90 | Refills: 1 | Status: ACTIVE | COMMUNITY
Start: 2024-04-19 | End: 1900-01-01

## 2024-04-22 ENCOUNTER — TRANSCRIPTION ENCOUNTER (OUTPATIENT)
Age: 79
End: 2024-04-22

## 2024-04-22 RX ORDER — ESCITALOPRAM OXALATE 20 MG/1
20 TABLET ORAL DAILY
Qty: 90 | Refills: 0 | Status: ACTIVE | COMMUNITY
Start: 2023-01-30 | End: 1900-01-01

## 2024-04-25 ENCOUNTER — RX RENEWAL (OUTPATIENT)
Age: 79
End: 2024-04-25

## 2024-04-25 RX ORDER — LISINOPRIL 10 MG/1
10 TABLET ORAL
Qty: 90 | Refills: 0 | Status: ACTIVE | COMMUNITY
Start: 1900-01-01 | End: 1900-01-01

## 2024-04-29 ENCOUNTER — APPOINTMENT (OUTPATIENT)
Dept: NEUROSURGERY | Facility: CLINIC | Age: 79
End: 2024-04-29
Payer: MEDICARE

## 2024-04-29 VITALS
BODY MASS INDEX: 26.07 KG/M2 | DIASTOLIC BLOOD PRESSURE: 77 MMHG | TEMPERATURE: 97.6 F | WEIGHT: 172 LBS | SYSTOLIC BLOOD PRESSURE: 125 MMHG | HEIGHT: 68 IN | HEART RATE: 61 BPM | RESPIRATION RATE: 18 BRPM | OXYGEN SATURATION: 97 %

## 2024-04-29 DIAGNOSIS — R41.3 OTHER AMNESIA: ICD-10-CM

## 2024-04-29 PROCEDURE — 99213 OFFICE O/P EST LOW 20 MIN: CPT

## 2024-04-29 NOTE — PHYSICAL EXAM
[General Appearance - Alert] : alert [General Appearance - In No Acute Distress] : in no acute distress [Oriented to Person] : oriented to person [Oriented to Place] : oriented to place [Person] : oriented to person [Place] : oriented to place [Cranial Nerves Optic (II)] : visual acuity intact bilaterally,  pupils equal round and reactive to light [Cranial Nerves Oculomotor (III)] : extraocular motion intact [Cranial Nerves Trigeminal (V)] : facial sensation intact symmetrically [Cranial Nerves Facial (VII)] : face symmetrical [Cranial Nerves Vestibulocochlear (VIII)] : hearing was intact bilaterally [Cranial Nerves Glossopharyngeal (IX)] : tongue and palate midline [Cranial Nerves Accessory (XI - Cranial And Spinal)] : head turning and shoulder shrug symmetric [Cranial Nerves Hypoglossal (XII)] : there was no tongue deviation with protrusion [Motor Tone] : muscle tone was normal in all four extremities [Motor Strength] : muscle strength was normal in all four extremities [Sclera] : the sclera and conjunctiva were normal [PERRL With Normal Accommodation] : pupils were equal in size, round, reactive to light, with normal accommodation [Extraocular Movements] : extraocular movements were intact [Hearing Threshold Finger Rub Not Karnes] : hearing was normal [] : no respiratory distress [Abnormal Walk] : normal gait [Oriented to Time] : disoriented to time [Time] : disoriented to time [Short Term Intact] : short term memory impaired

## 2024-04-29 NOTE — HISTORY OF PRESENT ILLNESS
[FreeTextEntry1] : The patient presents with a PMHx of HTN, anxiety, who was found to have left frontal mass Oct 2022 during workup for memory loss/confusion x several months who presented to our office for evaluation; S/P bifrontal craniotomy for resection 10/27/22.  PATH: Meningioma, CNS WHO grade 1  Her post operative course was complicated by GI bleed due to hemorrhoids and acute thalamic strokes. Subsequent workup revealed a PFO for which she was started on low dose aspirin. She also underwent ILR implantation to evaluate for the presence of occult arrhythmia. Was then discharged to to [facility] 11/18/24.   Saw BOO Youssef 4/8/2024 after lapse in follow- up from 2022. Referred back by PCP Dr. Torres. Daughter Junie  At previous appointment with BOO Youssef reported headaches to left frontal area. Also with intermittent issues of disorientation/memory issues that is similar to what patient originally presented with.    PCP: Omid Torres Cardiology: Keely Dorantes  TODAY 4/26/24- Presents to review MRI.

## 2024-04-29 NOTE — REVIEW OF SYSTEMS
[Confused or Disoriented] : confusion [Memory Lapses or Loss] : memory loss [Negative] : Constitutional [Facial Weakness] : no facial weakness [Arm Weakness] : no arm weakness [Hand Weakness] : no hand weakness [Leg Weakness] : no leg weakness [Poor Coordination] : good coordination

## 2024-04-29 NOTE — RESULTS/DATA
[FreeTextEntry1] :  	 ACC: [id number]     EXAM:  MR BRAIN WAW IC   ORDERED BY: INESSA UP  PROCEDURE DATE:  04/10/2024    INTERPRETATION:  PROCEDURE: MRI Brain with and without contrast  INDICATION: Meningioma status post resection, follow-up  TECHNIQUE: Multiplanar multi sequential MR images the brain were obtained before and following the administration of 7.5 mL IV Gadavist.  COMPARISON: MRI brain 11/10/2022, 10/30/2022, and 10/26/2022  FINDINGS:  Redemonstrated status post bifrontal craniotomy and frontal sinus obliteration for resection of a left frontal extra-axial mass. There is resolution of fluid collection deep to craniotomy site. There is encephalomalacia and gliosis in the left frontal lobe with resolution of vasogenic edema and mass effect. There is hemosiderin staining in the left frontal lobe which is stable from prior MRI 10/30/2022 and secondary to postsurgical changes. There is no nodular enhancement to suggest recurrent meningioma. There is dural thickening enhancement deep to craniotomy site secondary to postsurgical changes.  There is no hydrocephalus. There is no mass effect or midline shift. There is no acute intracranial hemorrhage. There is no evidence of recent infarction diffusion-weighted imaging..  The mastoid air cells are well-aerated.  IMPRESSION:  Status post resection of left frontal lobe meningioma. No evidence of recurrent tumor.  --- End of Report ---      NICHOLAS WINN MD; Attending Radiologist This document has been electronically signed. Apr 11 2024  2:41PM

## 2024-04-29 NOTE — ASSESSMENT
[FreeTextEntry1] : The patients established problem of  resected left frontal meningioma is stable.  I had a long discussion with the patient regarding the role of continued surveillance.  The patient was extensively educated about the nature of her disease process. Therapeutic and diagnostic tests include MRI w/wo IV in 4/2025 .  The patient should see MD Ronn Ortiz for neuropsych testing.  I will see the patient back in 1 year. I have explained the alternatives, risks and benefits to the patient and she understands and agrees to proceed.

## 2024-04-29 NOTE — DATA REVIEWED
[de-identified] : I have reviewed the most recent MRI on 4/10/24 at [facility] which shows status post resection of left frontal lobe meningioma. No evidence of recurrent tumor.

## 2024-04-30 PROBLEM — R41.3 MEMORY DEFICIT: Status: ACTIVE | Noted: 2024-04-30

## 2024-05-16 ENCOUNTER — RX RENEWAL (OUTPATIENT)
Age: 79
End: 2024-05-16

## 2024-05-23 ENCOUNTER — NON-APPOINTMENT (OUTPATIENT)
Age: 79
End: 2024-05-23

## 2024-05-23 ENCOUNTER — APPOINTMENT (OUTPATIENT)
Dept: HEART AND VASCULAR | Facility: CLINIC | Age: 79
End: 2024-05-23
Payer: MEDICARE

## 2024-05-23 PROCEDURE — 93298 REM INTERROG DEV EVAL SCRMS: CPT

## 2024-05-24 ENCOUNTER — TRANSCRIPTION ENCOUNTER (OUTPATIENT)
Age: 79
End: 2024-05-24

## 2024-06-04 RX ORDER — METOPROLOL TARTRATE 100 MG/1
100 TABLET, FILM COATED ORAL DAILY
Qty: 90 | Refills: 0 | Status: ACTIVE | COMMUNITY
Start: 2023-01-30 | End: 1900-01-01

## 2024-06-17 ENCOUNTER — RX RENEWAL (OUTPATIENT)
Age: 79
End: 2024-06-17

## 2024-06-17 RX ORDER — BUSPIRONE HYDROCHLORIDE 5 MG/1
5 TABLET ORAL
Qty: 120 | Refills: 0 | Status: ACTIVE | COMMUNITY
Start: 2024-04-01 | End: 1900-01-01

## 2024-06-27 ENCOUNTER — APPOINTMENT (OUTPATIENT)
Dept: HEART AND VASCULAR | Facility: CLINIC | Age: 79
End: 2024-06-27

## 2024-06-27 PROCEDURE — 93298 REM INTERROG DEV EVAL SCRMS: CPT

## 2024-07-10 DIAGNOSIS — L60.1 ONYCHOLYSIS: ICD-10-CM

## 2024-07-18 NOTE — STROKE CODE NOTE - STROKE CODE IN HOUSE NOTIFICATION
08-Nov-2022 13:50 Buprenorphine Tips:  Make sure you are letting your buprenorphine tablets or films dissolve completely under your tongue so you know you are getting all the medication.  Don't eat, drink, or talk while taking your buprenorphine.  Avoid nicotine for 15-30 minutes before taking buprenorphine - this can cause the blood vessels to constrict and you may not absorb the medication as well.  To avoid potential dental issues related to buprenorphine rinse your mouth with water after taking your dose.  Avoid brushing your teeth for 1 hour after taking a dose.     Constipation is the most common side effect of buprenorphine.  Here are some simple things you can try to ease constipation.  Eating more fiber (fruits, vegetable, and whole grains) and drinking enough fluid (urine should be light yellow).    Take Miralax (polyethylene gylcol).  Use 1 capful daily until you start to have loose stools and then decrease use.  You can also try Colace (docusate) 100mg twice daily as needed.  These medications can be prescribed or purchased OTC.  Let your provider know if you are still struggling with constipation.    What to do if you experience nausea or upset stomach after taking buprenorphine:  Make sure you are letting it dissolve completely.  After the medication has dissolved try spitting out your saliva instead of swallowing it.    Other common side effects include:   - sleepiness/drowsiness OR trouble sleeping  - headaches    Please discuss any side effects with your provider.    Important safety info:  Ensure your medication is stored in a safe place out of sight and out of reach from kids and pets, ideally locked away.   Do not combine buprenorphine with other sedating substances or medications such as alcohol, benzodiazepines (Xanax or alprazolam for example), or other opioids.  Only take medications as prescribed.                   08-Nov-2022 12:50

## 2024-07-24 ENCOUNTER — RX RENEWAL (OUTPATIENT)
Age: 79
End: 2024-07-24

## 2024-07-26 ENCOUNTER — APPOINTMENT (OUTPATIENT)
Dept: FAMILY MEDICINE | Facility: CLINIC | Age: 79
End: 2024-07-26

## 2024-07-26 VITALS
HEIGHT: 68 IN | SYSTOLIC BLOOD PRESSURE: 124 MMHG | TEMPERATURE: 98.2 F | BODY MASS INDEX: 26.98 KG/M2 | WEIGHT: 178 LBS | OXYGEN SATURATION: 96 % | DIASTOLIC BLOOD PRESSURE: 76 MMHG | HEART RATE: 70 BPM

## 2024-07-26 DIAGNOSIS — E78.5 HYPERLIPIDEMIA, UNSPECIFIED: ICD-10-CM

## 2024-07-26 DIAGNOSIS — I10 ESSENTIAL (PRIMARY) HYPERTENSION: ICD-10-CM

## 2024-07-26 DIAGNOSIS — E78.00 PURE HYPERCHOLESTEROLEMIA, UNSPECIFIED: ICD-10-CM

## 2024-07-26 DIAGNOSIS — L60.1 ONYCHOLYSIS: ICD-10-CM

## 2024-07-26 PROCEDURE — 99214 OFFICE O/P EST MOD 30 MIN: CPT | Mod: 25

## 2024-07-26 RX ORDER — PROPRANOLOL HYDROCHLORIDE 10 MG/1
10 TABLET ORAL
Qty: 15 | Refills: 0 | Status: ACTIVE | COMMUNITY
Start: 2024-07-26 | End: 1900-01-01

## 2024-07-26 NOTE — HISTORY OF PRESENT ILLNESS
[FreeTextEntry1] : discussion on meds  [de-identified] : 79 yo f presents to discuss meds  doing well on meds  still with breakthrough anxiety  compliant with meds  no side effects

## 2024-07-27 LAB
ALBUMIN SERPL ELPH-MCNC: 4.3 G/DL
ALP BLD-CCNC: 99 U/L
ALT SERPL-CCNC: 15 U/L
ANION GAP SERPL CALC-SCNC: 11 MMOL/L
AST SERPL-CCNC: 22 U/L
BILIRUB SERPL-MCNC: 0.3 MG/DL
BUN SERPL-MCNC: 20 MG/DL
CALCIUM SERPL-MCNC: 9.3 MG/DL
CHLORIDE SERPL-SCNC: 106 MMOL/L
CHOLEST SERPL-MCNC: 135 MG/DL
CO2 SERPL-SCNC: 25 MMOL/L
CREAT SERPL-MCNC: 0.85 MG/DL
EGFR: 71 ML/MIN/1.73M2
ESTIMATED AVERAGE GLUCOSE: 108 MG/DL
GLUCOSE SERPL-MCNC: 76 MG/DL
HBA1C MFR BLD HPLC: 5.4 %
HDLC SERPL-MCNC: 73 MG/DL
LDLC SERPL CALC-MCNC: 49 MG/DL
NONHDLC SERPL-MCNC: 63 MG/DL
POTASSIUM SERPL-SCNC: 4.5 MMOL/L
PROT SERPL-MCNC: 6.8 G/DL
SODIUM SERPL-SCNC: 141 MMOL/L
TRIGL SERPL-MCNC: 64 MG/DL

## 2024-08-01 ENCOUNTER — APPOINTMENT (OUTPATIENT)
Dept: HEART AND VASCULAR | Facility: CLINIC | Age: 79
End: 2024-08-01

## 2024-08-01 PROCEDURE — 93298 REM INTERROG DEV EVAL SCRMS: CPT

## 2024-08-19 ENCOUNTER — RX RENEWAL (OUTPATIENT)
Age: 79
End: 2024-08-19

## 2024-09-04 ENCOUNTER — NON-APPOINTMENT (OUTPATIENT)
Age: 79
End: 2024-09-04

## 2024-09-04 ENCOUNTER — APPOINTMENT (OUTPATIENT)
Dept: HEART AND VASCULAR | Facility: CLINIC | Age: 79
End: 2024-09-04
Payer: MEDICARE

## 2024-09-04 PROCEDURE — 93298 REM INTERROG DEV EVAL SCRMS: CPT

## 2024-09-05 ENCOUNTER — NON-APPOINTMENT (OUTPATIENT)
Age: 79
End: 2024-09-05

## 2024-10-04 ENCOUNTER — TRANSCRIPTION ENCOUNTER (OUTPATIENT)
Age: 79
End: 2024-10-04

## 2024-10-07 ENCOUNTER — APPOINTMENT (OUTPATIENT)
Dept: HEART AND VASCULAR | Facility: CLINIC | Age: 79
End: 2024-10-07
Payer: MEDICARE

## 2024-10-07 ENCOUNTER — NON-APPOINTMENT (OUTPATIENT)
Age: 79
End: 2024-10-07

## 2024-10-07 PROCEDURE — 93298 REM INTERROG DEV EVAL SCRMS: CPT

## 2024-10-10 ENCOUNTER — RX RENEWAL (OUTPATIENT)
Age: 79
End: 2024-10-10

## 2024-10-25 ENCOUNTER — RX RENEWAL (OUTPATIENT)
Age: 79
End: 2024-10-25

## 2024-11-08 ENCOUNTER — APPOINTMENT (OUTPATIENT)
Dept: HEART AND VASCULAR | Facility: CLINIC | Age: 79
End: 2024-11-08

## 2024-11-08 PROCEDURE — 93298 REM INTERROG DEV EVAL SCRMS: CPT

## 2024-11-20 ENCOUNTER — RX RENEWAL (OUTPATIENT)
Age: 79
End: 2024-11-20

## 2024-12-09 ENCOUNTER — RX RENEWAL (OUTPATIENT)
Age: 79
End: 2024-12-09

## 2024-12-13 ENCOUNTER — APPOINTMENT (OUTPATIENT)
Dept: HEART AND VASCULAR | Facility: CLINIC | Age: 79
End: 2024-12-13

## 2024-12-13 PROCEDURE — 93298 REM INTERROG DEV EVAL SCRMS: CPT

## 2025-01-08 ENCOUNTER — RX RENEWAL (OUTPATIENT)
Age: 80
End: 2025-01-08

## 2025-01-13 ENCOUNTER — RX RENEWAL (OUTPATIENT)
Age: 80
End: 2025-01-13

## 2025-01-17 ENCOUNTER — APPOINTMENT (OUTPATIENT)
Dept: HEART AND VASCULAR | Facility: CLINIC | Age: 80
End: 2025-01-17

## 2025-01-17 ENCOUNTER — NON-APPOINTMENT (OUTPATIENT)
Age: 80
End: 2025-01-17

## 2025-01-17 PROCEDURE — 93298 REM INTERROG DEV EVAL SCRMS: CPT

## 2025-01-30 ENCOUNTER — APPOINTMENT (OUTPATIENT)
Dept: FAMILY MEDICINE | Facility: CLINIC | Age: 80
End: 2025-01-30
Payer: MEDICARE

## 2025-01-30 VITALS
HEIGHT: 68 IN | OXYGEN SATURATION: 97 % | HEART RATE: 59 BPM | SYSTOLIC BLOOD PRESSURE: 131 MMHG | DIASTOLIC BLOOD PRESSURE: 80 MMHG | TEMPERATURE: 97.8 F | WEIGHT: 187 LBS | BODY MASS INDEX: 28.34 KG/M2

## 2025-01-30 DIAGNOSIS — R41.3 OTHER AMNESIA: ICD-10-CM

## 2025-01-30 DIAGNOSIS — Z86.03 OTHER SPECIFIED POSTPROCEDURAL STATES: ICD-10-CM

## 2025-01-30 DIAGNOSIS — Z98.890 OTHER SPECIFIED POSTPROCEDURAL STATES: ICD-10-CM

## 2025-01-30 PROCEDURE — 99214 OFFICE O/P EST MOD 30 MIN: CPT

## 2025-01-30 PROCEDURE — G2211 COMPLEX E/M VISIT ADD ON: CPT

## 2025-02-21 ENCOUNTER — NON-APPOINTMENT (OUTPATIENT)
Age: 80
End: 2025-02-21

## 2025-02-21 ENCOUNTER — APPOINTMENT (OUTPATIENT)
Dept: HEART AND VASCULAR | Facility: CLINIC | Age: 80
End: 2025-02-21

## 2025-02-21 PROCEDURE — 93298 REM INTERROG DEV EVAL SCRMS: CPT

## 2025-03-27 ENCOUNTER — APPOINTMENT (OUTPATIENT)
Dept: HEART AND VASCULAR | Facility: CLINIC | Age: 80
End: 2025-03-27
Payer: MEDICARE

## 2025-03-27 ENCOUNTER — NON-APPOINTMENT (OUTPATIENT)
Age: 80
End: 2025-03-27

## 2025-03-27 PROCEDURE — 93298 REM INTERROG DEV EVAL SCRMS: CPT

## 2025-03-28 ENCOUNTER — APPOINTMENT (OUTPATIENT)
Dept: MRI IMAGING | Facility: CLINIC | Age: 80
End: 2025-03-28
Payer: MEDICARE

## 2025-03-28 ENCOUNTER — OUTPATIENT (OUTPATIENT)
Dept: OUTPATIENT SERVICES | Facility: HOSPITAL | Age: 80
LOS: 1 days | End: 2025-03-28

## 2025-03-28 DIAGNOSIS — Z98.890 OTHER SPECIFIED POSTPROCEDURAL STATES: Chronic | ICD-10-CM

## 2025-03-28 PROCEDURE — 70553 MRI BRAIN STEM W/O & W/DYE: CPT | Mod: 26

## 2025-04-18 ENCOUNTER — NON-APPOINTMENT (OUTPATIENT)
Age: 80
End: 2025-04-18

## 2025-04-28 ENCOUNTER — APPOINTMENT (OUTPATIENT)
Dept: NEUROSURGERY | Facility: CLINIC | Age: 80
End: 2025-04-28
Payer: MEDICARE

## 2025-04-28 VITALS
WEIGHT: 185 LBS | OXYGEN SATURATION: 96 % | TEMPERATURE: 97.4 F | HEART RATE: 56 BPM | DIASTOLIC BLOOD PRESSURE: 82 MMHG | RESPIRATION RATE: 18 BRPM | BODY MASS INDEX: 28.04 KG/M2 | HEIGHT: 68 IN | SYSTOLIC BLOOD PRESSURE: 127 MMHG

## 2025-04-28 DIAGNOSIS — Z98.890 OTHER SPECIFIED POSTPROCEDURAL STATES: ICD-10-CM

## 2025-04-28 DIAGNOSIS — Z86.03 OTHER SPECIFIED POSTPROCEDURAL STATES: ICD-10-CM

## 2025-04-28 PROCEDURE — 99213 OFFICE O/P EST LOW 20 MIN: CPT

## 2025-05-01 ENCOUNTER — APPOINTMENT (OUTPATIENT)
Dept: HEART AND VASCULAR | Facility: CLINIC | Age: 80
End: 2025-05-01
Payer: MEDICARE

## 2025-05-01 ENCOUNTER — NON-APPOINTMENT (OUTPATIENT)
Age: 80
End: 2025-05-01

## 2025-05-01 PROCEDURE — 93298 REM INTERROG DEV EVAL SCRMS: CPT

## 2025-05-05 ENCOUNTER — RX RENEWAL (OUTPATIENT)
Age: 80
End: 2025-05-05

## 2025-06-05 ENCOUNTER — NON-APPOINTMENT (OUTPATIENT)
Age: 80
End: 2025-06-05

## 2025-06-05 ENCOUNTER — APPOINTMENT (OUTPATIENT)
Dept: HEART AND VASCULAR | Facility: CLINIC | Age: 80
End: 2025-06-05
Payer: MEDICARE

## 2025-06-05 PROCEDURE — 93298 REM INTERROG DEV EVAL SCRMS: CPT

## 2025-06-12 ENCOUNTER — APPOINTMENT (OUTPATIENT)
Dept: FAMILY MEDICINE | Facility: CLINIC | Age: 80
End: 2025-06-12
Payer: MEDICARE

## 2025-06-12 VITALS
SYSTOLIC BLOOD PRESSURE: 140 MMHG | WEIGHT: 190 LBS | BODY MASS INDEX: 28.79 KG/M2 | HEIGHT: 68 IN | OXYGEN SATURATION: 100 % | TEMPERATURE: 97.8 F | HEART RATE: 62 BPM | DIASTOLIC BLOOD PRESSURE: 81 MMHG

## 2025-06-12 VITALS — SYSTOLIC BLOOD PRESSURE: 134 MMHG | DIASTOLIC BLOOD PRESSURE: 80 MMHG

## 2025-06-12 PROCEDURE — 99214 OFFICE O/P EST MOD 30 MIN: CPT | Mod: 25

## 2025-06-13 LAB
ALBUMIN SERPL ELPH-MCNC: 4.2 G/DL
ALP BLD-CCNC: 127 U/L
ALT SERPL-CCNC: 26 U/L
ANION GAP SERPL CALC-SCNC: 13 MMOL/L
AST SERPL-CCNC: 32 U/L
BILIRUB SERPL-MCNC: 0.2 MG/DL
BUN SERPL-MCNC: 20 MG/DL
CALCIUM SERPL-MCNC: 9.4 MG/DL
CHLORIDE SERPL-SCNC: 107 MMOL/L
CHOLEST SERPL-MCNC: 153 MG/DL
CO2 SERPL-SCNC: 24 MMOL/L
CREAT SERPL-MCNC: 0.76 MG/DL
EGFRCR SERPLBLD CKD-EPI 2021: 81 ML/MIN/1.73M2
ESTIMATED AVERAGE GLUCOSE: 108 MG/DL
GLUCOSE SERPL-MCNC: 79 MG/DL
HBA1C MFR BLD HPLC: 5.4 %
HDLC SERPL-MCNC: 73 MG/DL
LDLC SERPL-MCNC: 69 MG/DL
NONHDLC SERPL-MCNC: 80 MG/DL
POTASSIUM SERPL-SCNC: 4.6 MMOL/L
PROT SERPL-MCNC: 7.1 G/DL
SODIUM SERPL-SCNC: 144 MMOL/L
TRIGL SERPL-MCNC: 56 MG/DL

## 2025-07-10 ENCOUNTER — APPOINTMENT (OUTPATIENT)
Dept: HEART AND VASCULAR | Facility: CLINIC | Age: 80
End: 2025-07-10

## 2025-07-10 PROCEDURE — 93298 REM INTERROG DEV EVAL SCRMS: CPT

## 2025-08-11 ENCOUNTER — RX RENEWAL (OUTPATIENT)
Age: 80
End: 2025-08-11

## 2025-08-12 ENCOUNTER — APPOINTMENT (OUTPATIENT)
Dept: HEART AND VASCULAR | Facility: CLINIC | Age: 80
End: 2025-08-12
Payer: MEDICARE

## 2025-08-12 ENCOUNTER — NON-APPOINTMENT (OUTPATIENT)
Age: 80
End: 2025-08-12

## 2025-08-12 PROCEDURE — 93298 REM INTERROG DEV EVAL SCRMS: CPT

## 2025-09-09 ENCOUNTER — RX RENEWAL (OUTPATIENT)
Age: 80
End: 2025-09-09

## 2025-09-16 ENCOUNTER — APPOINTMENT (OUTPATIENT)
Dept: HEART AND VASCULAR | Facility: CLINIC | Age: 80
End: 2025-09-16
Payer: MEDICARE

## 2025-09-16 ENCOUNTER — NON-APPOINTMENT (OUTPATIENT)
Age: 80
End: 2025-09-16

## 2025-09-16 PROCEDURE — 93298 REM INTERROG DEV EVAL SCRMS: CPT

## 2025-09-18 ENCOUNTER — APPOINTMENT (OUTPATIENT)
Dept: FAMILY MEDICINE | Facility: CLINIC | Age: 80
End: 2025-09-18
Payer: MEDICARE

## 2025-09-18 VITALS
HEART RATE: 68 BPM | WEIGHT: 191 LBS | OXYGEN SATURATION: 98 % | SYSTOLIC BLOOD PRESSURE: 119 MMHG | HEIGHT: 68 IN | BODY MASS INDEX: 28.95 KG/M2 | DIASTOLIC BLOOD PRESSURE: 66 MMHG | TEMPERATURE: 97.1 F

## 2025-09-18 DIAGNOSIS — E78.5 HYPERLIPIDEMIA, UNSPECIFIED: ICD-10-CM

## 2025-09-18 DIAGNOSIS — I10 ESSENTIAL (PRIMARY) HYPERTENSION: ICD-10-CM

## 2025-09-18 DIAGNOSIS — F41.9 ANXIETY DISORDER, UNSPECIFIED: ICD-10-CM

## 2025-09-18 DIAGNOSIS — F32.A ANXIETY DISORDER, UNSPECIFIED: ICD-10-CM

## 2025-09-18 PROCEDURE — 99214 OFFICE O/P EST MOD 30 MIN: CPT

## 2025-09-18 PROCEDURE — G2211 COMPLEX E/M VISIT ADD ON: CPT

## 2025-09-18 PROCEDURE — G0537: CPT

## (undated) DEVICE — BUR ROUTER MED

## (undated) DEVICE — Device

## (undated) DEVICE — MARKING PEN W RULER

## (undated) DEVICE — BIPOLAR FORCEP KOGENT IRRIGATING STRAIGHT 0.5MM X 7" DISP

## (undated) DEVICE — BIPOLAR ISOCOOL TIP 2MM

## (undated) DEVICE — LONE STAR RETRACTOR RING 12MM BLUNT DISP

## (undated) DEVICE — ARACHNOID BACKCUTTING LONG HANDLE 8"

## (undated) DEVICE — SUT VICRYL PLUS 2-0 18" CT-1 (POP-OFF)

## (undated) DEVICE — MIDAS REX LEGEND MATCH HEAD FLUTED LG BORE 3.0MM X 14CM

## (undated) DEVICE — DRSG TEGADERM 4X4.75"

## (undated) DEVICE — TUBING SUCTION 20FT

## (undated) DEVICE — DRAPE VARI-LENS2 MICROSCPOPE 68MM

## (undated) DEVICE — CODMAN PERFORATOR 14MM (BLUE)

## (undated) DEVICE — ARACHNOID BACKCUTTING SUPERFICIAL HANDLE 5"

## (undated) DEVICE — TAPE SILK 3"

## (undated) DEVICE — GLV 8.5 PROTEXIS (WHITE)

## (undated) DEVICE — MIDAS REX LEGEND TAPERED SM BORE 2.3MM X 8CM

## (undated) DEVICE — LUBRICATING JELLY ONESHOT 1.25OZ

## (undated) DEVICE — BUR GILLEN SURGICAL ACORN SHORT 7.5MM

## (undated) DEVICE — DRSG MASTISOL

## (undated) DEVICE — ARACHNOID CIRCULAR SUPERFICIAL HANDLE 5"

## (undated) DEVICE — ARACHNOID CIRCULAR LONG HANDLE 8"

## (undated) DEVICE — MIDAS REX LEGEND TAPERED SM BORE 1.1MM X 8CM

## (undated) DEVICE — VENODYNE/SCD SLEEVE CALF MEDIUM

## (undated) DEVICE — FOLEY TRAY 16FR 5CC LF UMETER CLOSED

## (undated) DEVICE — DRSG TELFA 1 X 3

## (undated) DEVICE — STRYKER INSTRUMENT BATTERY

## (undated) DEVICE — CRANIAL MASK TRACKER

## (undated) DEVICE — AESCULAP SCALPFIX 10 CLIPS

## (undated) DEVICE — PROBE PRASS SLIM MONOPOLAR STIMULATOR

## (undated) DEVICE — WARMING BLANKET LOWER ADULT

## (undated) DEVICE — GLV 8 PROTEXIS (WHITE)

## (undated) DEVICE — PREP BETADINE SPONGE STICKS

## (undated) DEVICE — DRAPE MAGNETIC INSTRUMENT MEDIUM

## (undated) DEVICE — DRAPE INSTRUMENT POUCH 6.75" X 11"

## (undated) DEVICE — SUT NUROLON 4-0 8-18" TF (POP-OFF)

## (undated) DEVICE — DRSG TELFA .5 X 3

## (undated) DEVICE — PACK CRANIOTOMY LNX SURGICOUNT

## (undated) DEVICE — STAPLER SKIN PROXIMATE